# Patient Record
Sex: FEMALE | Race: WHITE | NOT HISPANIC OR LATINO | Employment: FULL TIME | URBAN - METROPOLITAN AREA
[De-identification: names, ages, dates, MRNs, and addresses within clinical notes are randomized per-mention and may not be internally consistent; named-entity substitution may affect disease eponyms.]

---

## 2017-05-15 ENCOUNTER — GENERIC CONVERSION - ENCOUNTER (OUTPATIENT)
Dept: OTHER | Facility: OTHER | Age: 27
End: 2017-05-15

## 2017-09-25 ENCOUNTER — ALLSCRIPTS OFFICE VISIT (OUTPATIENT)
Dept: OTHER | Facility: OTHER | Age: 27
End: 2017-09-25

## 2017-10-30 ENCOUNTER — ALLSCRIPTS OFFICE VISIT (OUTPATIENT)
Dept: OTHER | Facility: OTHER | Age: 27
End: 2017-10-30

## 2017-10-31 NOTE — PROGRESS NOTES
Assessment  1  Encounter for gynecological examination without abnormal finding (V72 31) (Z01 419)    Plan  PMH: DONNA III (cervical intraepithelial neoplasia grade III) with severe dysplasia    · Follow-up visit in 1 year Evaluation and Treatment  Follow-up  Status: Hold For -  Scheduling  Requested for: [id number]   Ordered; For: PMH: DONNA III (cervical intraepithelial neoplasia grade III) with severe dysplasia; Ordered By: Clare Denton Performed:  Due: 71FOF0794  PMH: Encounter for birth control pills maintenance    · Lo Loestrin Fe 1 MG-10 MCG / 10 MCG Oral Tablet; TAKE 1 TABLET DAILY AS  DIRECTED   Rx By: Clare Denton; Dispense: 28 Days ; #:1 X 28 Tablet Disp Pack; Refill: 11; For: PMH: Encounter for birth control pills maintenance; GAGANDEEP = N; [facility]    Discussion/Summary  healthy adult female the risks and benefits of cervical cancer screening were discussed cervical cancer screening is current next cervical cancer screening is due 2019 Breast cancer screening: the risks and benefits of breast cancer screening were discussed and monthly self breast exam was advised  Advice and education were given regarding weight bearing exercise, calcium supplements and vitamin D supplements  Chief Complaint  Pt is here for her yearly exam, she has no complaints  History of Present Illness  HPI: 32year old white female here for yearly exam, no complaints  Cycles are very light and short on Lo Loestrin  She is now  for the past two months; they have been together for 9 years  She is not sure if they will pursue pregnancy in the future  She works at [employer] , Adult Female [facility]: The patient is being seen for a health maintenance evaluation  The last health maintenance visit was 1 year(s) ago  General Health: The patient's health since the last visit is described as good  Lifestyle:  She consumes a diverse and healthy diet  -- She does not have any weight concerns  -- She exercises regularly  She exercises less than three times a week  -- She does not use tobacco -- She consumes alcohol  She reports occasional alcohol use  -- She denies drug use  Reproductive health: the patient is premenopausal--   she reports no menstrual problems  -- she uses contraception  For contraception, she uses oral contraception pills  -- she is sexually active  -- she denies prior pregnancies  Screening: Cervical cancer screening includes a pap smear performed 9/12/2016,neg  Breast cancer screening includes no previous mammogram  Colorectal cancer screening includes no previous colonoscopy  Active Problems  1  Encounter for gynecological examination without abnormal finding (V72 31) (Z01 419)    Past Medical History   · History of Age At First Period 13 Years Old (Menarche)   · History of DONNA III (cervical intraepithelial neoplasia grade III) with severe dysplasia  (233 1) (D06 9)   · History of headache (V13 89) (Z87 898)   · History of Moderate dysplasia of cervix (DONNA II) (622 12) (N87 1)   · History of Pelvic and perineal pain (625 9) (R10 2)   · History of Vulvitis (616 10) (N76 2)    Surgical History   · History of Cervical Loop Electrosurgical Excision (LEEP)   · History of Colposcopy Cervix With Biopsy(S)    Family History  Mother    · No pertinent family history  Maternal Grandmother    · Family history of Breast Cancer (V16 3)   · Family history of Colon Cancer (V16 0)  Paternal Grandmother    · Family history of Rheumatoid Arthritis  Paternal Aunt    · Family history of Breast Cancer (V16 3)    Social History   · Being A Social Drinker   · Current every day smoker (305 1) (F17 200)   · Daily Coffee Consumption (2  Cups/Day)    Current Meds   1  Lo Loestrin Fe 1 MG-10 MCG / 10 MCG Oral Tablet; TAKE 1 TABLET DAILY AS   DIRECTED; Therapy: 75Emx9701 to (Evaluate:60Hyd7811)  Requested for: 87Pzp8904; Last   Rx:[id number] Ordered    Allergies  1   No Known Drug Allergies    Vitals   Recorded: 73YKV0596 18:76WK   Systolic 795, LUE, Sitting   Diastolic 74, LUE, Sitting   Height 5 ft 5 in   Weight 208 lb    BMI Calculated 34 61   BSA Calculated 2 01   LMP Amenorrhea     Physical Exam    Constitutional   General appearance: No acute distress, well appearing and well nourished  Neck   Neck: Normal, supple, trachea midline, no masses  Chest   Breasts: Normal and no dimpling or skin changes noted  Abdomen   Abdomen: Normal, non-tender, and no organomegaly noted         Signatures   Electronically signed by : Omar Chapin, [facility]; Oct 30 2017 10:32AM EST                       (Author)    Electronically signed by : Barbara Oseguera MD; Oct 30 2017  5:43PM EST

## 2018-01-12 NOTE — MISCELLANEOUS
Message   Recorded as Task   Date: 05/15/2017 01:11 PM, Created By: Ivan Sutton   Task Name: Med Renewal Request   Assigned To: Luis Miguel Montemayor   Regarding Patient: Yamilet Appiah, Status: Active   Comment:    YanethKenna - 15 May 2017 1:11 PM     TASK CREATED  Caller: Self; Renew Medication; [phone] (Home); [phone] (Work)  pt needs rx of lo loestrin sent to [facility] [address] [place] [address] [address] - 15 May 2017 1:17 PM     TASK EDITED  rx to pharm        Active Problems    1  Birth Control Method - Oral Contraceptives   2  DONNA III (cervical intraepithelial neoplasia grade III) with severe dysplasia (233 1) (D06 9)   3  Encounter for birth control pills maintenance (V25 41) (Z30 41)   4  Encounter for cervical Pap smear with pelvic exam (V76 2,V72 31) (Z01 419)   5  Encounter for gynecological examination without abnormal finding (V72 31) (Z01 419)   6  Encounter for routine gynecological examination with Papanicolaou smear of cervix   (V72 31,V76 2) (Z01 419)   7  Surveillance of contraceptive pill (V25 41) (Z30 41)    Current Meds   1  Lo Loestrin Fe 1 MG-10 MCG / 10 MCG Oral Tablet; TAKE 1 TABLET DAILY AS   DIRECTED; Therapy: 41RQN2258 to (Evaluate:39Xil1458)  Requested for: 27Cmt0784; Last   Rx:[id number] Ordered   2  Lo Loestrin Fe 1 MG-10 MCG / 10 MCG Oral Tablet; TAKE 1 TABLET DAILY AS   DIRECTED; Therapy: 97Wpw9189 to (Evaluate:68Zax1387)  Requested for: 07Ylq7692; Last   Rx:[id number] Ordered    Allergies    1   No Known Drug Allergies    Plan  SocHx: Birth Control Method - Oral Contraceptives    · Lo Loestrin Fe 1 MG-10 MCG / 10 MCG Oral Tablet; TAKE 1 TABLET DAILY AS  DIRECTED    Signatures   Electronically signed by : Donnie Galarza, ; May 15 2017  1:17PM EST                       (Author)

## 2018-01-15 VITALS
BODY MASS INDEX: 34.66 KG/M2 | SYSTOLIC BLOOD PRESSURE: 112 MMHG | HEIGHT: 65 IN | WEIGHT: 208 LBS | DIASTOLIC BLOOD PRESSURE: 74 MMHG

## 2018-01-15 NOTE — MISCELLANEOUS
Message   Recorded as Task   Date: 08/29/2016 03:31 PM, Created By: Karen Gay   Task Name: Med Renewal Request   Assigned To: Luis Miguel Montemayor   Regarding Patient: Robi Interiano, Status: Active   CommentTerese [facility] - 29 Aug 2016 3:31 PM     TASK CREATED  Caller: Self; [phone] (Home); [phone] (Work)  pt needs a refill on her [facility]  pharmacy is [facility]    pt has a yearly scheduled with CT on 9/12  Luis Miguel Montemayor - 29 Aug 2016 4:03 PM     TASK EDITED                 sent to pharm        Active Problems    1  Birth Control Method - Oral Contraceptives   2  DONNA III (cervical intraepithelial neoplasia grade III) with severe dysplasia (233 1) (D06 9)   3  Encounter for cervical Pap smear with pelvic exam (V76 2,V72 31) (Z01 419)   4  Encounter for routine gynecological examination with Papanicolaou smear of cervix   (V72 31,V76 2) (Z01 419)   5  Surveillance of contraceptive pill (V25 41) (Z30 41)    Current Meds   1  Lo Loestrin Fe 1 MG-10 MCG / 10 MCG Oral Tablet; TAKE 1 TABLET DAILY AS   DIRECTED; Therapy: 52PLQ6560 to (Evaluate:26Vjv1459)  Requested for: 91AFB5991; Last   Rx:[id number] Ordered    Allergies    1   No Known Drug Allergies    Plan  SocHx: Birth Control Method - Oral Contraceptives    · Lo Loestrin Fe 1 MG-10 MCG / 10 MCG Oral Tablet; TAKE 1 TABLET DAILY AS  DIRECTED    Signatures   Electronically signed by : Trudy Mcintyre, ; Aug 29 2016  4:03PM EST                       (Author)

## 2018-01-15 NOTE — MISCELLANEOUS
Message   Recorded as Task   Date: 05/23/2016 08:56 AM, Created By: [facility]   Task Name: Med Renewal Request   Assigned To: Luis Miguel Montemayor   Regarding Patient: Robi Interiano, Status: Active   CommentMarkristi Victoria - 23 May 2016 8:56 AM     TASK CREATED    recvd auto refill sent to pharm,yrly w/ carola in june        Active Problems    1  Birth Control Method - Oral Contraceptives   2  DONNA III (cervical intraepithelial neoplasia grade III) with severe dysplasia (233 1) (D06 9)   3  Encounter for cervical Pap smear with pelvic exam (V76 2,V72 31) (Z01 419)   4  Encounter for routine gynecological examination with Papanicolaou smear of cervix   (V72 31,V76 2) (Z01 419)   5  Surveillance of contraceptive pill (V25 41) (Z30 41)    Current Meds   1  Lo Loestrin Fe 1 MG-10 MCG / 10 MCG Oral Tablet; TAKE 1 TABLET DAILY AS   DIRECTED; Therapy: 72MJT3863 to (Evaluate:94Jdh8477)  Requested for: 69KXD6872; Last   Rx:[id number] Ordered    Allergies    1   No Known Drug Allergies    Plan  SocHx: Birth Control Method - Oral Contraceptives    · Lo Loestrin Fe 1 MG-10 MCG / 10 MCG Oral Tablet; TAKE 1 TABLET DAILY AS  DIRECTED    Signatures   Electronically signed by : Trudy Mcintyre, ; May 23 2016  8:56AM EST                       (Author)

## 2018-01-18 NOTE — MISCELLANEOUS
Message   Date: 27 May 2016 9:52 AM EST, Recorded By: Lauretta Gowers For: Stormy Monique       rx refill      Active Problems    1  Birth Control Method - Oral Contraceptives   2  DONNA III (cervical intraepithelial neoplasia grade III) with severe dysplasia (233 1) (D06 9)   3  Encounter for cervical Pap smear with pelvic exam (V76 2,V72 31) (Z01 419)   4  Encounter for routine gynecological examination with Papanicolaou smear of cervix   (V72 31,V76 2) (Z01 419)   5  Surveillance of contraceptive pill (V25 41) (Z30 41)    Current Meds   1  Lo Loestrin Fe 1 MG-10 MCG / 10 MCG Oral Tablet; TAKE 1 TABLET DAILY AS   DIRECTED; Therapy: 09OVL7727 to (Evaluate:23Puk4597)  Requested for: 96ZZP7161; Last   Rx:[id number] Ordered    Allergies    1   No Known Drug Allergies    Plan  SocHx: Birth Control Method - Oral Contraceptives    · Lo Loestrin Fe 1 MG-10 MCG / 10 MCG Oral Tablet; TAKE 1 TABLET DAILY AS  DIRECTED    Signatures   Electronically signed by : Mariusz Parks, ; May 27 2016  9:54AM EST                       (Author)

## 2018-04-29 ENCOUNTER — TELEPHONE (OUTPATIENT)
Dept: LABOR AND DELIVERY | Facility: HOSPITAL | Age: 28
End: 2018-04-29

## 2018-04-29 DIAGNOSIS — O20.9 BLEEDING IN EARLY PREGNANCY: Primary | ICD-10-CM

## 2018-04-29 NOTE — TELEPHONE ENCOUNTER
Patient called newly pregnancy 4-5 weeks  Today started bleeding  Some cramping  Plan check HCG level  Discussed possible miscarriage

## 2018-04-30 ENCOUNTER — TELEPHONE (OUTPATIENT)
Dept: OBGYN CLINIC | Facility: CLINIC | Age: 28
End: 2018-04-30

## 2018-04-30 ENCOUNTER — APPOINTMENT (OUTPATIENT)
Dept: LAB | Facility: HOSPITAL | Age: 28
End: 2018-04-30
Attending: OBSTETRICS & GYNECOLOGY
Payer: COMMERCIAL

## 2018-04-30 DIAGNOSIS — O20.9 BLEEDING IN EARLY PREGNANCY: ICD-10-CM

## 2018-04-30 LAB — B-HCG SERPL-ACNC: 4 MIU/ML

## 2018-04-30 PROCEDURE — 84702 CHORIONIC GONADOTROPIN TEST: CPT

## 2018-04-30 PROCEDURE — 36415 COLL VENOUS BLD VENIPUNCTURE: CPT

## 2018-04-30 NOTE — TELEPHONE ENCOUNTER
Spoke with pt - bleeding started yesterday - started off spotting, my mid-day it was heavy with cramps  Still heavy today  Patient stopped her OCP Lo Loestrin 3 months ago, was on it for 8 years  No bleeding since stopping it  Sexually active and trying for a baby  Took HPT few weeks ago - thinks it was positive  She called the on-call doctor, he advised her to wait for our call this morning and she will go for an HCG  Order is in the chart

## 2018-07-26 ENCOUNTER — TELEPHONE (OUTPATIENT)
Dept: OBGYN CLINIC | Facility: CLINIC | Age: 28
End: 2018-07-26

## 2018-07-26 NOTE — TELEPHONE ENCOUNTER
Pt had an ob scare, thought she was pregnant in [place] or feb had bw done was negative, but has not gotten her period since then please advise

## 2018-07-26 NOTE — TELEPHONE ENCOUNTER
Spoke with patient, not pregnant, but no menses since January 2018   I advised an appt since patient has not been seen since 10/2017

## 2018-08-06 ENCOUNTER — OFFICE VISIT (OUTPATIENT)
Dept: OBGYN CLINIC | Facility: MEDICAL CENTER | Age: 28
End: 2018-08-06
Payer: COMMERCIAL

## 2018-08-06 VITALS — SYSTOLIC BLOOD PRESSURE: 122 MMHG | BODY MASS INDEX: 31.78 KG/M2 | WEIGHT: 191 LBS | DIASTOLIC BLOOD PRESSURE: 84 MMHG

## 2018-08-06 DIAGNOSIS — N91.2 AMENORRHEA: Primary | ICD-10-CM

## 2018-08-06 LAB — SL AMB POCT URINE HCG: NEGATIVE

## 2018-08-06 PROCEDURE — 99213 OFFICE O/P EST LOW 20 MIN: CPT | Performed by: PHYSICIAN ASSISTANT

## 2018-08-06 PROCEDURE — 81025 URINE PREGNANCY TEST: CPT | Performed by: PHYSICIAN ASSISTANT

## 2018-08-06 RX ORDER — MEDROXYPROGESTERONE ACETATE 10 MG/1
10 TABLET ORAL DAILY
Qty: 10 TABLET | Refills: 0 | Status: SHIPPED | OUTPATIENT
Start: 2018-08-06 | End: 2020-02-06 | Stop reason: ALTCHOICE

## 2018-08-06 NOTE — PROGRESS NOTES
Assessment/Plan:  Problem List Items Addressed This Visit     None      Visit Diagnoses     Amenorrhea    -  Primary    Relevant Medications    medroxyPROGESTERone (PROVERA) 10 mg tablet    Other Relevant Orders    POCT urine HCG (Completed)        Subjective:      Patient ID: Lexi Belle is a 29 y o  female  Patient presents to the office complaining of irregular menses x 7 months  Patient states she stopped OCP 1/2018 to try to concieve and since then only had one menses in May 2018  Patient had been on OCP x 10 years and states that prior to starting OCPs her menses were fairly regular give or take 1 week  Patient had (+) pregnancy test May 2018 but then bled and B-HCG negative  That was her last menses  Has taken multiple HPT all negative  Is not on a PNV  The following portions of the patient's history were reviewed and updated as appropriate: allergies, current medications, past family history, past medical history, past social history, past surgical history and problem list     Review of Systems   Constitutional: Negative  Gastrointestinal: Negative for abdominal pain  Genitourinary: Positive for menstrual problem  Negative for vaginal bleeding, vaginal discharge and vaginal pain  Objective:  /84   Wt 86 6 kg (191 lb)   LMP 05/06/2018 (Approximate)   BMI 31 78 kg/m²      Physical Exam   Constitutional: She is oriented to person, place, and time  She appears well-developed and well-nourished  No distress  Abdominal: Soft  She exhibits no distension  There is no tenderness  Genitourinary: Uterus is not enlarged and not tender  Cervix exhibits no motion tenderness  Right adnexum displays no mass, no tenderness and no fullness  Left adnexum displays no mass, no tenderness and no fullness  Neurological: She is alert and oriented to person, place, and time  Psychiatric: She has a normal mood and affect  Nursing note and vitals reviewed

## 2019-02-03 ENCOUNTER — APPOINTMENT (EMERGENCY)
Dept: RADIOLOGY | Facility: HOSPITAL | Age: 29
End: 2019-02-03
Payer: COMMERCIAL

## 2019-02-03 ENCOUNTER — HOSPITAL ENCOUNTER (EMERGENCY)
Facility: HOSPITAL | Age: 29
Discharge: HOME | End: 2019-02-03
Attending: EMERGENCY MEDICINE | Admitting: EMERGENCY MEDICINE
Payer: COMMERCIAL

## 2019-02-03 VITALS
OXYGEN SATURATION: 97 % | DIASTOLIC BLOOD PRESSURE: 68 MMHG | SYSTOLIC BLOOD PRESSURE: 137 MMHG | RESPIRATION RATE: 16 BRPM | HEART RATE: 95 BPM | TEMPERATURE: 98.7 F

## 2019-02-03 DIAGNOSIS — S93.402A SPRAIN OF LEFT ANKLE, UNSPECIFIED LIGAMENT, INITIAL ENCOUNTER: Primary | ICD-10-CM

## 2019-02-03 PROCEDURE — 99283 EMERGENCY DEPT VISIT LOW MDM: CPT

## 2019-02-03 PROCEDURE — 73610 X-RAY EXAM OF ANKLE: CPT

## 2019-02-03 RX ORDER — IBUPROFEN 600 MG/1
600 TABLET ORAL ONCE
Status: COMPLETED | OUTPATIENT
Start: 2019-02-03 | End: 2019-02-03

## 2019-02-03 RX ADMIN — IBUPROFEN 600 MG: 600 TABLET ORAL at 12:12

## 2019-02-03 NOTE — DISCHARGE INSTRUCTIONS

## 2019-02-03 NOTE — ED PROVIDER NOTES
History  Chief Complaint   Patient presents with    Ankle Injury     pt post L ankle injury, pt injured it 2 weeks ago and re injured it yesterday      HPI     59-year-old previously healthy female presenting with pain and swelling to her left ankle  Patient states that she suspects she sprained her ankle 2 weeks ago when she slipped on the ice, was not seen by a doctor  Yesterday she was walking in  High heels when she inverted her ankle, reports immediate increased pain to the area  States her friend caught her before she fell to the ground  She has been hobbling on it since that time  Reports pain over the lateral malleolus  No pain over the toes, bottom of the foot, or dorsum of the foot  Swelling has improved with applying ice  She is not taking any medication for the pain  She has had broken bones in that foot in the past as a teenager, but does not remember which ones  Has never had surgery to the area  Reports some decreased sensation to the 4th and 5th toes, but states that she is still able to feel them  No tingling  No pain in the remainder of the extremities  Pain is worse with bearing weight and with movement of the ankle, no other aggravating or alleviating factors or associated symptoms  Prior to Admission Medications   Prescriptions Last Dose Informant Patient Reported? Taking? medroxyPROGESTERone (PROVERA) 10 mg tablet   No No   Sig: Take 1 tablet (10 mg total) by mouth daily for 10 days      Facility-Administered Medications: None       History reviewed  No pertinent past medical history  History reviewed  No pertinent surgical history  History reviewed  No pertinent family history  I have reviewed and agree with the history as documented      Social History   Substance Use Topics    Smoking status: Current Every Day Smoker     Packs/day: 0 25     Years: 10 00     Types: Cigarettes    Smokeless tobacco: Never Used    Alcohol use Yes      Comment: occ        Review of Systems   Constitutional: Negative for chills and fever  HENT: Negative for congestion  Eyes: Negative for visual disturbance  Respiratory: Negative for cough and shortness of breath  Cardiovascular: Negative for chest pain  Gastrointestinal: Negative for abdominal pain, nausea and vomiting  Genitourinary: Negative for flank pain  Musculoskeletal: Positive for arthralgias (L ankle pain) and joint swelling (L ankle pain)  Negative for back pain, neck pain and neck stiffness  Skin: Negative for rash  Neurological: Negative for weakness, numbness and headaches  Psychiatric/Behavioral: Negative for agitation, behavioral problems and confusion  Physical Exam  Physical Exam   Constitutional: She is oriented to person, place, and time  She appears well-developed and well-nourished  No distress  HENT:   Head: Normocephalic and atraumatic  Right Ear: External ear normal    Left Ear: External ear normal    Nose: Nose normal    Mouth/Throat: Oropharynx is clear and moist    Eyes: Conjunctivae are normal    Neck: Normal range of motion  Neck supple  Cardiovascular: Normal rate, regular rhythm, normal heart sounds and intact distal pulses  Exam reveals no gallop and no friction rub  No murmur heard  Pulmonary/Chest: Effort normal and breath sounds normal  No respiratory distress  She has no wheezes  She has no rales  Abdominal: Soft  Bowel sounds are normal  She exhibits no distension  There is no tenderness  There is no guarding  Musculoskeletal: Normal range of motion  She exhibits no edema or deformity  Mild ankle swelling, with swelling concentrated over the left lateral malleolus, tenderness to palpation  No overlying erythema or warmth  Range of motion the left ankle limited by pain  No bony tenderness over the foot  Full range of motion of the toes without pain  Neurological: She is alert and oriented to person, place, and time  She exhibits normal muscle tone     Intact sensation to light touch in the peripheral nerve distributions of the left foot  Skin: Skin is warm and dry  She is not diaphoretic  Vital Signs  ED Triage Vitals   Temperature Pulse Respirations Blood Pressure SpO2   02/03/19 1129 02/03/19 1127 02/03/19 1127 02/03/19 1127 02/03/19 1127   98 7 °F (37 1 °C) 95 16 137/68 97 %      Temp src Heart Rate Source Patient Position - Orthostatic VS BP Location FiO2 (%)   -- 02/03/19 1127 02/03/19 1127 02/03/19 1127 --    Monitor Sitting Right arm       Pain Score       02/03/19 1127       7           Vitals:    02/03/19 1127   BP: 137/68   Pulse: 95   Patient Position - Orthostatic VS: Sitting       Visual Acuity      ED Medications  Medications   ibuprofen (MOTRIN) tablet 600 mg (600 mg Oral Given 2/3/19 1212)       Diagnostic Studies  Results Reviewed     None                 XR ankle 3+ views LEFT   ED Interpretation by Rene Moran MD (02/03 1234)   No acute fractures or malalignment                 Procedures  Procedures       Phone Contacts  ED Phone Contact    ED Course                               MDM  Number of Diagnoses or Management Options  Sprain of left ankle, unspecified ligament, initial encounter: new and requires workup  Diagnosis management comments: Generally well appearing  Afebrile and hemodynamically stable  Patient has swelling over the lateral malleolus to the left ankle  Range of motion limited by pain  No other areas of bony tenderness  X-ray of the left ankle obtained with no acute fractures or malalignment  Suspect ankle sprain  Will place in an air stirrup splint, give crutches, recommend follow up with her PCP in 1 week if pain persists  Can return to normal activity as tolerated  Apply ice and take NSAIDs for pain relief  Return precautions discussed for sudden increase in swelling with redness or warmth over the joint  Patient is neurovascularly intact  Discharged in good condition         Amount and/or Complexity of Data Reviewed  Tests in the radiology section of CPT®: ordered and reviewed  Independent visualization of images, tracings, or specimens: yes    Patient Progress  Patient progress: stable           Disposition  Final diagnoses:   Sprain of left ankle, unspecified ligament, initial encounter     Time reflects when diagnosis was documented in both MDM as applicable and the Disposition within this note     Time User Action Codes Description Comment    2/3/2019 12:37 PM Elodia Spurling Add [S93 402A] Sprain of left ankle, unspecified ligament, initial encounter       ED Disposition     ED Disposition Condition Date/Time Comment    Discharge  Sun Feb 3, 2019 12:37 PM Madison Harada discharge to home/self care  Condition at discharge: Good        Follow-up Information     Follow up With Specialties Details Why Contact Info Additional [facility] Emergency Department Emergency Medicine  For redness, warmth, fevers, or new or concerning symptoms  [address] [address] ED, [address] [place] [address], [address]          Discharge Medication List as of 2/3/2019 12:38 PM      CONTINUE these medications which have NOT CHANGED    Details   medroxyPROGESTERone (PROVERA) 10 mg tablet Take 1 tablet (10 mg total) by mouth daily for 10 days, Starting Mon 8/6/2018, Until Thu 8/16/2018, Normal           No discharge procedures on file      ED Provider  Electronically Signed by           Alla Lozano MD  02/03/19 2033

## 2020-01-17 ENCOUNTER — ULTRASOUND (OUTPATIENT)
Dept: OBGYN CLINIC | Facility: CLINIC | Age: 30
End: 2020-01-17
Payer: COMMERCIAL

## 2020-01-17 VITALS
HEIGHT: 64 IN | WEIGHT: 184.8 LBS | SYSTOLIC BLOOD PRESSURE: 110 MMHG | BODY MASS INDEX: 31.55 KG/M2 | DIASTOLIC BLOOD PRESSURE: 78 MMHG

## 2020-01-17 DIAGNOSIS — N91.2 AMENORRHEA: Primary | ICD-10-CM

## 2020-01-17 PROCEDURE — 76817 TRANSVAGINAL US OBSTETRIC: CPT | Performed by: NURSE PRACTITIONER

## 2020-01-17 NOTE — PROGRESS NOTES
Technician: Study performed by the interpreting Certified Nurse Practitioner    Indications:  amenorrhea       Prior to use, disinfection was performed with Cidex Disinfection Process  Probe Serial Number#                                            [id number] [facility])      LMP 19   (7 weeks 5 days gestational age based on dates)              Procedure Details  A gestational sac is seen containing a yolk sac and a watkins embryo  The embryonic crown-rump length measures 1 39 cm  and calculates to an estimated gestational age of 11  weeks and 5 Days  Embryonic cardiac activity is present @ 150 BPM     Findings:   Viable, watkins intrauterine pregnancy at 7 weeks,  5 days, (no change to HAYLEE)  with a calculated HAYLEE of 2020     Plan to RTO for OB Intake

## 2020-01-17 NOTE — PATIENT INSTRUCTIONS
First Trimester Pregnancy   WHAT YOU NEED TO KNOW:   The first trimester of pregnancy lasts from your last period through the 13th week of pregnancy  Follow up with your healthcare provider for prenatal care  Regular prenatal care can help to keep you and your baby healthy  DISCHARGE INSTRUCTIONS:   Return to the emergency department if:   · You have pain or cramping in your abdomen or low back  · You have severe vaginal bleeding or clotting  Contact your healthcare provider or obstetrician if:   · You have light bleeding  · You have chills or a fever  · You have vaginal itching, burning, or pain  · You have yellow, green, white, or foul-smelling vaginal discharge  · You have pain or burning when you urinate, less urine than usual, or pink or bloody urine  · You have questions or concerns about your condition or care  Follow up with your healthcare provider or obstetrician as directed:  Go to all of your prenatal visits during your pregnancy  Write down your questions so you remember to ask them during your visits  Stay healthy during pregnancy:   · Take prenatal vitamins as directed  Prenatal vitamins can help you get the amount of vitamins and minerals you need during pregnancy  Prenatal vitamins may also decrease the risk of certain birth defects  · Eat a variety of healthy foods  Healthy foods include fruits, vegetables, whole-grain breads, low-fat dairy products, beans, turkey and chicken, and lean red meat  Ask your healthcare provider for more information about foods that are healthy and safe to eat during pregnancy  · Drink liquids as directed  Ask how much liquid to drink each day and which liquids are best for you  Some healthy liquids include milk, water, and juice  It is not clear how caffeine affects pregnancy  Limit your intake of caffeine to less than 200 mg each day to avoid possible health problems   Caffeine may be found in coffee, tea, cola, sports drinks, and chocolate  Do not drink alcohol  · Talk to your healthcare provider before you take medicines  Many medicines can harm your baby, especially during early pregnancy  Ask your healthcare provider before you take any medicines, including over-the-counter medicines, vitamins, herbs, or food supplements  Never use illegal or street drugs while you are pregnant  Talk to your healthcare provider if you are having trouble quitting street drugs  · Exercise  Ask your healthcare provider about the best exercise plan for you  Exercise may help you feel better and make your labor and delivery easier  · Do not smoke  Smoking can cause problems during pregnancy  It can also cause your baby to weigh less at birth  If you smoke, it is never too late to quit  Ask for information if you need help quitting  Safety tips:   · Do not use a hot tub or sauna  while you are pregnant, especially during your first trimester  Hot tubs and saunas may raise your baby's temperature and increase the risk of birth defects  It also increases your risk of miscarriage  · Protect yourself from illness  Toxoplasmosis is a disease that can cause birth defects  To protect yourself from this disease, do not clean your cat's litter box yourself  Ask someone else to clean your cat's litter box while you are pregnant  Also, do not  eat raw meat or unwashed fruits and vegetables  Wash your hands after you touch raw meat, and eat only well-cooked meat  Wash fruits and vegetables well before you eat them  © 2017 [employer] Information is for End User's use only and may not be sold, redistributed or otherwise used for commercial purposes  All illustrations and images included in CareNotes® are the copyrighted property of A D A M , Inc  or [employer]  The above information is an  only  It is not intended as medical advice for individual conditions or treatments   Talk to your doctor, nurse or pharmacist before following any medical regimen to see if it is safe and effective for you

## 2020-02-06 ENCOUNTER — INITIAL PRENATAL (OUTPATIENT)
Dept: OBGYN CLINIC | Facility: CLINIC | Age: 30
End: 2020-02-06

## 2020-02-06 VITALS
HEART RATE: 76 BPM | HEIGHT: 64 IN | SYSTOLIC BLOOD PRESSURE: 102 MMHG | WEIGHT: 187 LBS | BODY MASS INDEX: 31.92 KG/M2 | DIASTOLIC BLOOD PRESSURE: 68 MMHG

## 2020-02-06 DIAGNOSIS — Z34.01 ENCOUNTER FOR SUPERVISION OF NORMAL FIRST PREGNANCY IN FIRST TRIMESTER: Primary | ICD-10-CM

## 2020-02-06 PROCEDURE — OBC: Performed by: STUDENT IN AN ORGANIZED HEALTH CARE EDUCATION/TRAINING PROGRAM

## 2020-02-06 NOTE — PATIENT INSTRUCTIONS
Pregnancy   AMBULATORY CARE:   What you need to know about pregnancy:  A normal pregnancy lasts about 40 weeks  The first trimester lasts from your last period through the 12th week of pregnancy  The second trimester lasts from the 13th week of your pregnancy through the 23rd week  The third trimester lasts from your 24th week of pregnancy until your baby is born  If you know the date of your last period, your healthcare provider can estimate your due date  You may give birth to your baby any time from 37 weeks to 2 weeks after your due date  Seek care immediately if:   · You develop a severe headache that does not go away  · You have new or increased vision changes, such as blurred or spotted vision  · You have new or increased swelling in your face or hands  · You have pain or cramping in your abdomen or low back  · You have vaginal bleeding  Contact your healthcare provider or obstetrician if:   · You have abdominal cramps, pressure, or tightening  · You have a change in vaginal discharge  · You cannot keep food or drinks down, and you are losing weight  · You have chills or a fever  · You have vaginal itching, burning, or pain  · You have yellow, green, white, or foul-smelling vaginal discharge  · You have pain or burning when you urinate, less urine than usual, or pink or bloody urine  · You have questions or concerns about your condition or care  Body changes that may occur during your pregnancy:   · Breast changes  you will experience include tenderness and tingling during the early part of your pregnancy  Your breasts will become larger  You may need to use a support bra  You may see a thin, yellow fluid, called colostrum, leak from your nipples during the second trimester  Colostrum is a liquid that changes to milk about 3 days after you give birth  · Skin changes and stretch marks  may occur during your pregnancy   You may have red marks, called stretch marks, on your skin  Stretch marks will usually fade after pregnancy  Use lotion if your skin is dry and itchy  The skin on your face, around your nipples, and below your belly button may darken  Most of the time, your skin will return to its normal color after your baby is born  · Morning sickness  is nausea and vomiting that can happen at any time of day  Avoid fatty and spicy foods  Eat small meals throughout the day instead of large meals  [place] may help to decrease nausea  Ask your healthcare provider about other ways of decreasing nausea and vomiting  · Heartburn  may be caused by changes in your hormones during pregnancy  Your growing uterus may also push your stomach upward and force stomach acid to back up into your esophagus  Eat 4 or 5 small meals each day instead of large meals  Avoid spicy foods  Avoid eating right before bedtime  · Constipation  may develop during your pregnancy  To treat constipation, eat foods high in fiber such as fiber cereals, beans, fruits, vegetables, whole-grain breads, and prune juice  Get regular exercise and drink plenty of water  Your healthcare provider may also suggest a fiber supplement to soften your bowel movements  Talk to your healthcare provider before you use any medicines to decrease constipation  · Hemorrhoids  are enlarged veins in the rectal area  They may cause pain, itching, and bright red bleeding from your rectum  To decrease your risk of hemorrhoids, prevent constipation and do not strain to have a bowel movement  If you have hemorrhoids, soak in a tub of warm water to ease discomfort  Ask your healthcare provider how you can treat hemorrhoids  · Leg cramps and swelling  may be caused by low calcium levels or the added weight of pregnancy  Raise your legs above the level of your heart to decrease swelling  During a leg cramp, stretch or massage the muscle that has the cramp  Heat may help decrease pain and muscle spasms   Apply heat on your muscle for 20 to 30 minutes every 2 hours for as many days as directed  · Back pain  may occur as your baby grows  Do not stand for long periods of time or lift heavy items  Use good posture while you stand, squat, or bend  Wear low-heeled shoes with good support  Rest may also help to relieve back pain  Ask your healthcare provider about exercises you can do to strengthen your back muscles  Stay healthy during your pregnancy:   · Eat a variety of healthy foods  Healthy foods include fruits, vegetables, whole-grain breads, low-fat dairy foods, beans, lean meats, and fish  Drink liquids as directed  Ask how much liquid to drink each day and which liquids are best for you  Limit caffeine to less than 200 milligrams each day  Limit your intake of fish to 2 servings each week  Choose fish low in mercury such as canned light tuna, shrimp, crab, salmon, cod, or tilapia  Do not  eat fish high in mercury such as swordfish, tilefish, janet mackerel, and shark  · Take prenatal vitamins as directed  Your need for certain vitamins and minerals, such as folic acid, increases during pregnancy  Prenatal vitamins provide some of the extra vitamins and minerals you need  Prenatal vitamins may also help to decrease the risk of certain birth defects  · Ask how much weight you should gain during your pregnancy  Too much or too little weight gain can be unhealthy for you and your baby  · Talk to your healthcare provider about exercise  Moderate exercise can help you stay fit  Your healthcare provider will help you plan an exercise program that is safe for you during pregnancy  · Do not smoke  If you smoke, it is never too late to quit  Smoking increases your risk of a miscarriage and other health problems during your pregnancy  Smoking can cause your baby to be born too early or weigh less at birth  Ask your healthcare provider for information if you need help quitting  · Do not drink alcohol    Alcohol passes from your body to your baby through the placenta  It can affect your baby's brain development and cause fetal alcohol syndrome (FAS)  FAS is a group of conditions that causes mental, behavior, and growth problems  · Talk to your healthcare provider before you take any medicines  Many medicines may harm your baby if you take them when you are pregnant  Do not take any medicines, vitamins, herbs, or supplements without first talking to your healthcare provider  Never use illegal or street drugs (such as marijuana or cocaine) while you are pregnant  Safety tips:   · Avoid hot tubs and saunas  Do not use a hot tub or sauna while you are pregnant, especially during your first trimester  Hot tubs and saunas may raise your baby's temperature and increase the risk of birth defects  · Avoid toxoplasmosis  This is an infection caused by eating raw meat or being around infected cat feces  It can cause birth defects, miscarriages, and other problems  Wash your hands after you touch raw meat  Make sure any meat is well-cooked before you eat it  Avoid raw eggs and unpasteurized milk  Use gloves or ask someone else to clean your cat's litter box while you are pregnant  · Ask your healthcare provider about travel  The most comfortable time to travel is during the second trimester  Ask your healthcare provider if you can travel after 36 weeks  You may not be able to travel in an airplane after 36 weeks  He may also recommend that you avoid long road trips  Follow up with your healthcare provider or obstetrician as directed:  Go to all of your prenatal visits during your pregnancy  Write down your questions so you remember to ask them during your visits  © 2017 [address] [employer]  Information is for End User's use only and may not be sold, redistributed or otherwise used for commercial purposes   All illustrations and images included in CareNotes® are the copyrighted property of A D A M , Inc  or [facility] [employer]  The above information is an  only  It is not intended as medical advice for individual conditions or treatments  Talk to your doctor, nurse or pharmacist before following any medical regimen to see if it is safe and effective for you

## 2020-02-06 NOTE — PROGRESS NOTES
OB INTAKE INTERVIEW  * Pt presents for OB intake 10w4d   * Accompanied by:  Reilly Astudillo  *  *Hx of  delivery prior to 36 weeks 6 days no  *Last Menstrual Period: Pt's LMP was 19  *Ultrasound date:20   7weeks 5days  *Estimated date of delivery: 20   * confirmed by LMP    *Signs/Symptoms of Pregnancy   *nausea in evenings, breast tenderness, fatigue     *constipation no   *headaches no   *cramping/spotting-Yes mild cramping   *PICA cravings no  *Diabetes- if you answer yes, please order 1 hour GTT, 50g   *hx of GDM no   *BMI >35 no   *first degree relative with type 2 diabetes no   *hx of PCOS no   *current metformin use no   *prior hx of LGA/macrosomia no   *AMA with other risk factors no  *Hypertension- if you answer yes, please order preeclampsia labs including 24 hour urine protein   *Hx of chronic HTN no   *hx of gestational HTN no   *hx of preeclampsia, eclampsia, or HELLP syndrome no  *Infection Screening-    *does the pt have a hx of MRSA? no   *if yes- please follow MRSA protocol and obtain a nasal swab for MRSA culture   *history of herpes? no   *ok for blood transfusion YES  *Immunizations:   *influenza vaccine given today no-declines    *discussed Tdap vaccine-yes    *Interview education   *[facility] Pregnancy Essentials Book reviewed and discussed   *Handouts given:    *Baby and Me phone renu guide    *Baby and Me support center    *[facility]     *discussed genetic testing- pt interested YES     *appointment at [facility] made YES    *Prenatal lab work scripts advised to complete by 20   *Nurse/Family Partnership- pt may qualify YES; referral placed no-declines at this time, info given and they understands that they need to enroll by 28wks   *4 P's- substance abuse screening    Presently using? no    Past use? no    Partner using? no    Parents/Family using? no    *I have these concerns about this prenatal patient: unplanned but welcomed pregnancy   Couple has been been together since high school, they had a early SAB in 2018  Ariadna Velez was doing the Keto diet before finding out that she was pregnant, so she has gained about 8lbs so far  We did discuss healthy food/fluid options and weight gain during pregnancy  She was a 10 cigarette a day smoker for about 15 years but quit with confirmed pregnancy in early    She is exposed to second hand smoke with Nba Prabhakar but he is trying to quit and down to 2 a day  She is also exposed heavily at the [employer] which is where she works  *Details that I feel the provider should be aware of: Overall feeling well  Daniel Lin mother is RH negative and had complications with both pregnancies so Ariadna Velez is anxious to have her blood type determined  Nba Prabhakar Father  at age 55 due to bleeding complications from Factor V  He has not been tested himself  PN1 visit scheduled  The patient was oriented to our practice and all questions were answered  [employer]      Interviewed by: Iain Yuen RN

## 2020-02-08 ENCOUNTER — TRANSCRIBE ORDERS (OUTPATIENT)
Dept: ADMINISTRATIVE | Facility: HOSPITAL | Age: 30
End: 2020-02-08

## 2020-02-08 ENCOUNTER — APPOINTMENT (OUTPATIENT)
Dept: LAB | Facility: HOSPITAL | Age: 30
End: 2020-02-08
Attending: STUDENT IN AN ORGANIZED HEALTH CARE EDUCATION/TRAINING PROGRAM
Payer: COMMERCIAL

## 2020-02-08 DIAGNOSIS — Z34.01 ENCOUNTER FOR SUPERVISION OF NORMAL FIRST PREGNANCY IN FIRST TRIMESTER: ICD-10-CM

## 2020-02-08 DIAGNOSIS — Z34.01 ENCOUNTER FOR SUPERVISION OF NORMAL FIRST PREGNANCY IN FIRST TRIMESTER: Primary | ICD-10-CM

## 2020-02-08 LAB
ABO GROUP BLD: NORMAL
BACTERIA UR QL AUTO: ABNORMAL /HPF
BASOPHILS # BLD AUTO: 0.05 THOUSANDS/ΜL (ref 0–0.1)
BASOPHILS NFR BLD AUTO: 1 % (ref 0–1)
BILIRUB UR QL STRIP: NEGATIVE
BLD GP AB SCN SERPL QL: NEGATIVE
CLARITY UR: CLEAR
COLOR UR: YELLOW
EOSINOPHIL # BLD AUTO: 0.2 THOUSAND/ΜL (ref 0–0.61)
EOSINOPHIL NFR BLD AUTO: 3 % (ref 0–6)
ERYTHROCYTE [DISTWIDTH] IN BLOOD BY AUTOMATED COUNT: 11.9 % (ref 11.6–15.1)
GLUCOSE UR STRIP-MCNC: NEGATIVE MG/DL
HCT VFR BLD AUTO: 36 % (ref 34.8–46.1)
HGB BLD-MCNC: 12.1 G/DL (ref 11.5–15.4)
HGB UR QL STRIP.AUTO: NEGATIVE
IMM GRANULOCYTES # BLD AUTO: 0.04 THOUSAND/UL (ref 0–0.2)
IMM GRANULOCYTES NFR BLD AUTO: 1 % (ref 0–2)
KETONES UR STRIP-MCNC: NEGATIVE MG/DL
LEUKOCYTE ESTERASE UR QL STRIP: NEGATIVE
LYMPHOCYTES # BLD AUTO: 1.81 THOUSANDS/ΜL (ref 0.6–4.47)
LYMPHOCYTES NFR BLD AUTO: 23 % (ref 14–44)
MCH RBC QN AUTO: 30.8 PG (ref 26.8–34.3)
MCHC RBC AUTO-ENTMCNC: 33.6 G/DL (ref 31.4–37.4)
MCV RBC AUTO: 92 FL (ref 82–98)
MONOCYTES # BLD AUTO: 0.57 THOUSAND/ΜL (ref 0.17–1.22)
MONOCYTES NFR BLD AUTO: 7 % (ref 4–12)
NEUTROPHILS # BLD AUTO: 5.23 THOUSANDS/ΜL (ref 1.85–7.62)
NEUTS SEG NFR BLD AUTO: 65 % (ref 43–75)
NITRITE UR QL STRIP: NEGATIVE
NON-SQ EPI CELLS URNS QL MICRO: ABNORMAL /HPF
NRBC BLD AUTO-RTO: 0 /100 WBCS
PH UR STRIP.AUTO: 6 [PH]
PLATELET # BLD AUTO: 341 THOUSANDS/UL (ref 149–390)
PMV BLD AUTO: 9.4 FL (ref 8.9–12.7)
PROT UR STRIP-MCNC: NEGATIVE MG/DL
RBC # BLD AUTO: 3.93 MILLION/UL (ref 3.81–5.12)
RBC #/AREA URNS AUTO: ABNORMAL /HPF
RH BLD: NEGATIVE
SP GR UR STRIP.AUTO: 1.02 (ref 1–1.03)
SPECIMEN EXPIRATION DATE: NORMAL
UROBILINOGEN UR QL STRIP.AUTO: 0.2 E.U./DL
WBC # BLD AUTO: 7.9 THOUSAND/UL (ref 4.31–10.16)
WBC #/AREA URNS AUTO: ABNORMAL /HPF

## 2020-02-08 PROCEDURE — 81001 URINALYSIS AUTO W/SCOPE: CPT

## 2020-02-08 PROCEDURE — 36415 COLL VENOUS BLD VENIPUNCTURE: CPT

## 2020-02-08 PROCEDURE — 87086 URINE CULTURE/COLONY COUNT: CPT

## 2020-02-08 PROCEDURE — 80081 OBSTETRIC PANEL INC HIV TSTG: CPT

## 2020-02-09 LAB
BACTERIA UR CULT: NORMAL
HBV SURFACE AG SER QL: NORMAL
RPR SER QL: NORMAL
RUBV IGG SERPL IA-ACNC: >175 IU/ML

## 2020-02-10 LAB — HIV 1+2 AB+HIV1 P24 AG SERPL QL IA: NORMAL

## 2020-02-11 ENCOUNTER — TELEPHONE (OUTPATIENT)
Dept: OBGYN CLINIC | Facility: CLINIC | Age: 30
End: 2020-02-11

## 2020-02-11 NOTE — TELEPHONE ENCOUNTER
----- Message from Lelia Flowers MD sent at 2/11/2020 10:59 AM EST -----  Please let Kaci Saleh know the good news that her prenatal labs all came back normal, blood type A negative  Will discuss further at her follow up OB appointment if she wishes    Thank! -AMM

## 2020-02-11 NOTE — TELEPHONE ENCOUNTER
Called and spoke to pt informing below  Pt asked if she needed to come in sooner, due to seeing some of the results with explanation points  I confirmed with Dr Neeru Ross and told pt that she can keep her next appt as is-does not need to come in sooner  Pt confirmed understanding

## 2020-02-18 PROBLEM — Z87.891 FORMER SMOKER: Status: ACTIVE | Noted: 2020-02-18

## 2020-02-18 PROBLEM — O26.899 RH NEGATIVE STATE IN ANTEPARTUM PERIOD: Status: ACTIVE | Noted: 2020-02-18

## 2020-02-18 PROBLEM — Z98.890 HISTORY OF LOOP ELECTRICAL EXCISION PROCEDURE (LEEP): Status: ACTIVE | Noted: 2020-02-18

## 2020-02-18 PROBLEM — Z67.91 RH NEGATIVE STATE IN ANTEPARTUM PERIOD: Status: ACTIVE | Noted: 2020-02-18

## 2020-02-20 ENCOUNTER — INITIAL PRENATAL (OUTPATIENT)
Dept: OBGYN CLINIC | Facility: CLINIC | Age: 30
End: 2020-02-20

## 2020-02-20 VITALS
DIASTOLIC BLOOD PRESSURE: 70 MMHG | HEIGHT: 64 IN | SYSTOLIC BLOOD PRESSURE: 118 MMHG | WEIGHT: 189.6 LBS | BODY MASS INDEX: 32.37 KG/M2

## 2020-02-20 DIAGNOSIS — Z98.890 HISTORY OF LOOP ELECTRICAL EXCISION PROCEDURE (LEEP): ICD-10-CM

## 2020-02-20 DIAGNOSIS — Z67.91 RH NEGATIVE STATE IN ANTEPARTUM PERIOD: ICD-10-CM

## 2020-02-20 DIAGNOSIS — Z34.01 ENCOUNTER FOR SUPERVISION OF NORMAL FIRST PREGNANCY IN FIRST TRIMESTER: Primary | ICD-10-CM

## 2020-02-20 DIAGNOSIS — O26.899 RH NEGATIVE STATE IN ANTEPARTUM PERIOD: ICD-10-CM

## 2020-02-20 DIAGNOSIS — Z87.891 FORMER SMOKER: ICD-10-CM

## 2020-02-20 LAB
SL AMB  POCT GLUCOSE, UA: NEGATIVE
SL AMB POCT URINE PROTEIN: NEGATIVE

## 2020-02-20 PROCEDURE — G0145 SCR C/V CYTO,THINLAYER,RESCR: HCPCS | Performed by: STUDENT IN AN ORGANIZED HEALTH CARE EDUCATION/TRAINING PROGRAM

## 2020-02-20 PROCEDURE — 87491 CHLMYD TRACH DNA AMP PROBE: CPT | Performed by: STUDENT IN AN ORGANIZED HEALTH CARE EDUCATION/TRAINING PROGRAM

## 2020-02-20 PROCEDURE — PNV: Performed by: STUDENT IN AN ORGANIZED HEALTH CARE EDUCATION/TRAINING PROGRAM

## 2020-02-20 PROCEDURE — 87591 N.GONORRHOEAE DNA AMP PROB: CPT | Performed by: STUDENT IN AN ORGANIZED HEALTH CARE EDUCATION/TRAINING PROGRAM

## 2020-02-20 NOTE — PROGRESS NOTES
Initial Prenatal Appointment - Obstetrics   Zia Mullins 27 y o  female MRN: [id number]  [facility]  Encounter: [id number]    Chief Complaint   Patient presents with    Initial Prenatal Visit     Patient is here for her Initial Prenatal visit she is    Patient is 12 weeks 4 days at today's visit   Patient is A negative blood type   History of Present Illness     Zia Mullins is a 27 y o  female  at 13w2d Estimated Date of Delivery: 20 by 7 5/7 week ultrasound on 2020, consistent with LMP (Patient's last menstrual period was 2019 )  She presents for her initial prenatal appointment  She is accompanied with her  [place] Son  She denies leakage of fluid, vaginal bleeding, or cramping  Concerns for her today: interested in if able to bowl and eat potato chips    PRENATAL ISSUES  1  History LEEP  2  History of smoking, exposed to second hand smoke  3  FOB with family history of Factor V, has not been tested  4  BMI 30  5  Rh negative    REVIEW OF SYSTEMS  CONSTITUTIONAL:  No weight loss, fever, chills, weakness  HEENT: No visual loss, blurred vision  SKIN: No rash or itching  CARDIOVASCULAR: No chest pain, chest pressure, or chest discomfort  RESPIRATORY: No shortness of breath, cough or sputum  GASTROINTESTINAL: No nausea, emesis, or diarrhea  NEUROLOGICAL: No headache, dizziness, syncope, paralysis  MUSCUOSKELETAL: No muscle, back pain, joint stiffness or bruising  INFECTIOUS: No fever, chills  PSYCHIATRIC: No disorder of thought or mood  HEMATOLOGIC: No easy bruising or bleeding  GYN: No vaginal bleeding   Occasional cramping    OB History        2    Para   0    Term   0       0    AB   1    Living   0       SAB   1    TAB   0    Ectopic   0    Multiple   0    Live Births   0                 #: 1, Date: , Sex: None, Weight: None, GA: 6w0d, Delivery: None, Apgar1: None, Apgar5: None, Living: None, Birth Comments: None    #: 2, Date: None, Sex: None, Weight: None, GA: None, Delivery: None, Apgar1: None, Apgar5: None, Living: None, Birth Comments: None      G1 SAB no intervention, did not receive Rhogam          GYN History  Patient's last menstrual period was 2019  Positive history abnormal Pap smears  History of LEEP  No history STD (gonorrhea, chlamydia, herpes)      Past Medical History:   Diagnosis Date    Abnormal Pap smear of cervix     Anxiety        Patient Active Problem List   Diagnosis    Amenorrhea    Rh negative state in antepartum period    History of loop electrical excision procedure (LEEP)    Former smoker       Past Surgical History:   Procedure Laterality Date    CERVICAL BIOPSY  W/ LOOP ELECTRODE EXCISION      WISDOM TOOTH EXTRACTION  2017       Family History   Problem Relation Age of Onset    Thyroid disease Mother     Asthma Mother     Thyroid disease Father     Thyroid disease Maternal Grandmother     Alzheimer's disease Maternal Grandfather     Heart disease Paternal Grandmother        Social History   Social History     Substance and Sexual Activity   Alcohol Use Not Currently    Frequency: 4 or more times a week    Drinks per session: 1 or 2    Comment: daily-stopped with confirmed preg      Social History     Substance and Sexual Activity   Drug Use Never     Social History     Tobacco Use   Smoking Status Former Smoker    Packs/day: 0 50    Years: 10 00    Pack years: 5 00    Types: Cigarettes    Start date: 2005    Last attempt to quit: 2020    Years since quittin 1   Smokeless Tobacco Never Used   Tobacco Comment    quit with confirmed preg        Current Outpatient Medications:     Prenatal Vit-Fe Fumarate-FA (PRENATAL VITAMIN PLUS LOW IRON PO), Take by mouth, Disp: , Rfl:     Allergies   Allergen Reactions    Penicillins Vomiting       Objective   Vitals: Blood pressure 118/70, height 5' 4" (1 626 m), weight 86 kg (189 lb 9 6 oz), last menstrual period 2019  Body mass index is 32 54 kg/m²  General: NAD, AAOx3  Neck: supple, no thyromegaly or thyroid nodules appreciated  Heart: RRR  Lungs: CTAB  Breast: nipples everted bilaterally, no skin changes  No dimpling, redness, or erythema  No breast masses or axillary masses bilaterally  Abdomen: soft, non-distended, non tender to palpation  Extremities: non-tender to palpation  Speculum exam: Normal appearing external genitalia, normal hair distribution  Urethra well-suspended, no clitoromegaly noted  Vagina pink, moist, well-rugated  physiologic discharge noted  Cervix without lesion, nulliparous appearing  not dilated  No blood in vaginal vault    Pelvic exam: No cervical motion tenderness  No adnexal masses or tenderness  anteverted uterus, 12 week size  Fetal heart rate 167 bpm by M mode  Lab Results:   Initial Prenatal on 2020   Component Date Value    POCT URINE PROTEIN 2020 negative     GLUCOSE, UA 2020 negative         Assessment/Plan     27 y o   12w4d here for initial prenatal appointment      Diagnoses and all orders for this visit:    Encounter for supervision of normal first pregnancy in first trimester  -     POCT urine dip  -     Liquid-based pap, screening  -     Chlamydia/GC amplified DNA by PCR   - Encouraged FOB to get blood work to determine if has Factor V gene - if positive would encourage Viv Campbell to also be tested   - Okay for bowling, to avoid abdominal trauma (uses 6 lb ball)   - Okay for potato chips - advised to avoid raw foods, deli meat, albacore tuna  History of loop electrical excision procedure (LEEP)   - Pap smear today   - Reviewed need for cervical length at 16 weeks, possibility of shortened cervix, risk miscarriage, and possible need for intervention  Rh negative state in antepartum period   - Understands needs Rhogam at 28 weeks, also if any vagina lbleeding   - Did not receive Rhogam after miscarriage previously - negative antibody screen  Former smoker   - Congratulated on quitting   - Encouraged FOB to quit as well to avoid second hand smoke, has been cutting back  BMI 30 0-30 9,adult   - Recommended weight gain 11-20 pounds total   - Encouraged healthy eating   - Discussed possibility of starting baby aspirin 162 mg for prevention preeclampsia, will consider    For routine OB follow up

## 2020-02-21 ENCOUNTER — ROUTINE PRENATAL (OUTPATIENT)
Dept: PERINATAL CARE | Facility: OTHER | Age: 30
End: 2020-02-21
Payer: COMMERCIAL

## 2020-02-21 VITALS
SYSTOLIC BLOOD PRESSURE: 115 MMHG | DIASTOLIC BLOOD PRESSURE: 70 MMHG | HEIGHT: 64 IN | HEART RATE: 83 BPM | WEIGHT: 190.6 LBS | BODY MASS INDEX: 32.54 KG/M2

## 2020-02-21 DIAGNOSIS — Z34.01 ENCOUNTER FOR SUPERVISION OF NORMAL FIRST PREGNANCY IN FIRST TRIMESTER: ICD-10-CM

## 2020-02-21 DIAGNOSIS — Z3A.12 12 WEEKS GESTATION OF PREGNANCY: ICD-10-CM

## 2020-02-21 DIAGNOSIS — O99.211 MATERNAL OBESITY, ANTEPARTUM, FIRST TRIMESTER: Primary | ICD-10-CM

## 2020-02-21 DIAGNOSIS — Z98.890 H/O LEEP (LOOP ELECTROSURGICAL EXCISION PROCEDURE) OF CERVIX COMPLICATING PREGNANCY, FIRST TRIMESTER: ICD-10-CM

## 2020-02-21 DIAGNOSIS — Z36.82 ENCOUNTER FOR ANTENATAL SCREENING FOR NUCHAL TRANSLUCENCY: ICD-10-CM

## 2020-02-21 DIAGNOSIS — O34.41 H/O LEEP (LOOP ELECTROSURGICAL EXCISION PROCEDURE) OF CERVIX COMPLICATING PREGNANCY, FIRST TRIMESTER: ICD-10-CM

## 2020-02-21 LAB
C TRACH DNA SPEC QL NAA+PROBE: NEGATIVE
N GONORRHOEA DNA SPEC QL NAA+PROBE: NEGATIVE

## 2020-02-21 PROCEDURE — 76813 OB US NUCHAL MEAS 1 GEST: CPT | Performed by: OBSTETRICS & GYNECOLOGY

## 2020-02-21 PROCEDURE — 99201 PR OFFICE OUTPATIENT NEW 10 MINUTES: CPT | Performed by: OBSTETRICS & GYNECOLOGY

## 2020-02-21 RX ORDER — ASPIRIN 81 MG/1
162 TABLET, CHEWABLE ORAL DAILY
Qty: 180 TABLET | Refills: 1 | Status: SHIPPED | OUTPATIENT
Start: 2020-02-21 | End: 2020-05-05 | Stop reason: SDUPTHER

## 2020-02-21 NOTE — LETTER
February 21, 2020     [address]  [address] [address]    Patient: Ajit Reina   YOB: 1990   Date of Visit: 2/21/2020       Dear Dr Vahid Ash: Thank you for referring Ajit Reina to me for evaluation  Below are my notes for this consultation  If you have questions, please do not hesitate to call me  I look forward to following your patient along with you  Sincerely,        Dyllan Bear MD        CC: No Recipients  Dyllan Bear MD  2/21/2020  8:22 AM  Sign at close encounter  Please refer to the [facility] ultrasound report in Ob Procedures for additional information regarding the visit to the [facility]  today

## 2020-02-21 NOTE — PROGRESS NOTES
Please refer to the [facility] ultrasound report in Ob Procedures for additional information regarding the visit to the [facility]  today

## 2020-02-22 LAB — MISSING INFO: NORMAL

## 2020-02-24 ENCOUNTER — DOCUMENTATION (OUTPATIENT)
Dept: PERINATAL CARE | Facility: CLINIC | Age: 30
End: 2020-02-24

## 2020-02-24 NOTE — PROGRESS NOTES
I received notification from "[employer] that there was missing information on Anuja's lab slip for her part 1 sequential screen  Per Sandy Paulino at [facility] there was no nuchal translucency on the lab slip  I updated this information and Sandy Paulino states that the result will now be processed

## 2020-02-25 LAB
# FETUSES US: 1
AGE: NORMAL
B-HCG ADJ MOM SERPL: 1.67
B-HCG SERPL-ACNC: 118.4 IU/ML
COLLECT DATE: NORMAL
CURRENT SMOKER: NO
DONATED EGG PATIENT QL: NO
FET CRL US.MEAS: 64 MM
FET CRL US.MEAS: NORMAL MM
FET NUCHAL FOLD MOM THICKNESS US.MEAS: 1.04
FET NUCHAL FOLD THICKNESS US.MEAS: 1.5 MM
FET NUCHAL FOLD THICKNESS US.MEAS: NORMAL MM
FET TS 21 RISK FROM MAT AGE: NORMAL
GA CLIN EST: 12.6 WK
GA METHOD: NORMAL
HX OF NTD NARR: NO
HX OF TRISOMY 21 NARR: NO
IDDM PATIENT QL: NO
INTEGRATED SCN PATIENT-IMP: NORMAL
PAPP-A MOM SERPL: 1.28
PAPP-A SERPL-MCNC: 849 NG/ML
PHYSICIAN NPI: NORMAL
SL AMB NASAL BONE: PRESENT
SL AMB NTQR LOCATION ID#: NORMAL
SL AMB NTQR READING PHYS ID#: NORMAL
SL AMB REFERRING PHYSICIAN NAME: NORMAL
SL AMB REFERRING PHYSICIAN PHONE: NORMAL
SL AMB REPEAT SPECIMEN: NO
SL AMB TWIN B NASAL BONE: NORMAL
SONOGRAPHER NAME: NORMAL
SONOGRAPHER: NORMAL
SONOGRAPHER: NORMAL
TS 18 RISK FETUS: NORMAL
TS 21 RISK FETUS: NORMAL
US DATE: NORMAL
US FETUSES STUDY [IMP]: NORMAL

## 2020-02-26 ENCOUNTER — TELEPHONE (OUTPATIENT)
Dept: PERINATAL CARE | Facility: OTHER | Age: 30
End: 2020-02-26

## 2020-02-26 LAB
LAB AP GYN PRIMARY INTERPRETATION: NORMAL
LAB AP LMP: NORMAL
Lab: NORMAL

## 2020-02-26 NOTE — TELEPHONE ENCOUNTER
Spoke with Sharyn Robles Step wise 1 result and instruction for step wise 2 provided  She verbalized understandings of all and had no question at this time  Address confirmed, TRF mailed

## 2020-02-26 NOTE — TELEPHONE ENCOUNTER
----- Message from Hank Armijo MD sent at 2/25/2020 12:15 PM EST -----  I reviewed the lab study today and the results are normal

## 2020-03-18 ENCOUNTER — ROUTINE PRENATAL (OUTPATIENT)
Dept: OBGYN CLINIC | Facility: CLINIC | Age: 30
End: 2020-03-18

## 2020-03-18 VITALS — WEIGHT: 192 LBS | DIASTOLIC BLOOD PRESSURE: 74 MMHG | SYSTOLIC BLOOD PRESSURE: 116 MMHG | BODY MASS INDEX: 33.48 KG/M2

## 2020-03-18 DIAGNOSIS — Z34.02 ENCOUNTER FOR SUPERVISION OF NORMAL FIRST PREGNANCY IN SECOND TRIMESTER: Primary | ICD-10-CM

## 2020-03-18 DIAGNOSIS — Z98.890 HISTORY OF LOOP ELECTRICAL EXCISION PROCEDURE (LEEP): ICD-10-CM

## 2020-03-18 DIAGNOSIS — O26.899 RH NEGATIVE STATE IN ANTEPARTUM PERIOD: ICD-10-CM

## 2020-03-18 DIAGNOSIS — Z67.91 RH NEGATIVE STATE IN ANTEPARTUM PERIOD: ICD-10-CM

## 2020-03-18 LAB
SL AMB  POCT GLUCOSE, UA: NORMAL
SL AMB POCT URINE PROTEIN: NORMAL

## 2020-03-18 PROCEDURE — PNV: Performed by: NURSE PRACTITIONER

## 2020-03-18 NOTE — PATIENT INSTRUCTIONS
Pregnancy at 15 to 18 Weeks   AMBULATORY CARE:   What changes are happening to your body:  Now that you are in your second trimester, you have more energy  You may also feel hungrier than usual  You may start to experience other symptoms, such as heartburn or dizziness  You may be gaining about ½ to 1 pound a week, and your pregnancy is beginning to show  You may need to start wearing maternity clothes  Seek care immediately if:   · You have pain or cramping in your abdomen or low back  · You have heavy vaginal bleeding or clotting  · You pass material that looks like tissue or large clots  Collect the material and bring it with you  Contact your healthcare provider if:   · You cannot keep food or drinks down, and you are losing weight  · You have light bleeding  · You have chills or a fever  · You have vaginal itching, burning, or pain  · You have yellow, green, white, or foul-smelling vaginal discharge  · You have pain or burning when you urinate, less urine than usual, or pink or bloody urine  · You have questions or concerns about your condition or care  How to care for yourself at this stage of your pregnancy:   · Manage heartburn  by eating 4 or 5 small meals each day instead of large meals  Avoid spicy foods  Avoid eating right before bedtime  · Manage nausea and vomiting  Avoid fatty and spicy foods  Eat small meals throughout the day instead of large meals  [place] may help to decrease nausea  Ask your healthcare provider about other ways of decreasing nausea and vomiting  · Eat a variety of healthy foods  Healthy foods include fruits, vegetables, whole-grain breads, low-fat dairy foods, beans, lean meats, and fish  Drink liquids as directed  Ask how much liquid to drink each day and which liquids are best for you  Limit caffeine to less than 200 milligrams each day  Limit your intake of fish to 2 servings each week   Choose fish low in mercury such as canned light tuna, shrimp, salmon, cod, or tilapia  Do not  eat fish high in mercury such as swordfish, tilefish, janet mackerel, and shark  · Take prenatal vitamins as directed  Your need for certain vitamins and minerals, such as folic acid, increases during pregnancy  Prenatal vitamins provide some of the extra vitamins and minerals you need  Prenatal vitamins may also help to decrease the risk of certain birth defects  · Do not smoke  If you smoke, it is never too late to quit  Smoking increases your risk of a miscarriage and other health problems during your pregnancy  Smoking can cause your baby to be born too early or weigh less at birth  Ask your healthcare provider for information if you need help quitting  · Do not drink alcohol  Alcohol passes from your body to your baby through the placenta  It can affect your baby's brain development and cause fetal alcohol syndrome (FAS)  FAS is a group of conditions that causes mental, behavior, and growth problems  · Talk to your healthcare provider before you take any medicines  Many medicines may harm your baby if you take them when you are pregnant  Do not take any medicines, vitamins, herbs, or supplements without first talking to your healthcare provider  Never use illegal or street drugs (such as marijuana or cocaine) while you are pregnant  Safety tips during pregnancy:   · Avoid hot tubs and saunas  Do not use a hot tub or sauna while you are pregnant, especially during your first trimester  Hot tubs and saunas may raise your baby's temperature and increase the risk of birth defects  · Avoid toxoplasmosis  This is an infection caused by eating raw meat or being around infected cat feces  It can cause birth defects, miscarriages, and other problems  Wash your hands after you touch raw meat  Make sure any meat is well-cooked before you eat it  Avoid raw eggs and unpasteurized milk   Use gloves or ask someone else to clean your cat's litter box while you are pregnant  Changes that are happening with your baby:  By 18 weeks, your baby may be about 6 inches long from the top of the head to the rump (baby's bottom)  Your baby may weigh about 11 ounces  You may be able to feel your baby's movement at about 18 weeks or later  The first movements may not be that noticeable  They may feel like a fluttering sensation  Your baby also makes sucking movements and can hear certain sounds  What you need to know about prenatal care:  During the first 28 weeks of your pregnancy, you will see your healthcare provider once a month  Your healthcare provider will check your blood pressure and weight  You may also need any of the following:  · A urine test  may also be done to check for sugar and protein  These can be signs of gestational diabetes or infection  · A blood test  may be done to check for anemia (low iron level)  · Fundal height check  is a measurement of your uterus to check your baby's growth  This number is usually the same as the number of weeks that you have been pregnant  · An ultrasound  may be done to check your baby's development  Your healthcare provider may be able to tell you what your baby's gender is during the ultrasound  · Your baby's heart rate  will be checked  © 2017 [address] Information is for End User's use only and may not be sold, redistributed or otherwise used for commercial purposes  All illustrations and images included in CareNotes® are the copyrighted property of [employer]  or [employer]  The above information is an  only  It is not intended as medical advice for individual conditions or treatments  Talk to your doctor, nurse or pharmacist before following any medical regimen to see if it is safe and effective for you

## 2020-03-18 NOTE — PROGRESS NOTES
Problem List Items Addressed This Visit        Other    Rh negative state in antepartum period      Other Visit Diagnoses     Encounter for supervision of normal first pregnancy in second trimester    -  Primary    History of loop electrical excision procedure (LEEP)        BMI 30 0-30 9,adult            Here with FOB  Feels well  Denies LOF/CTX/VB  L2 appt on 4/16  Declined flu shot  Sequential screen in progress  No concerns

## 2020-03-20 LAB
# FETUSES US: 1
AFP ADJ MOM SERPL: 1.22
AFP SERPL-MCNC: 34.6 NG/ML
B-HCG ADJ MOM SERPL: 1.08
B-HCG SERPL-ACNC: 31.4 IU/ML
COLLECT DATE: NORMAL
CURRENT SMOKER: NO
FET CRL US.MEAS: 64 MM
FET NUCHAL FOLD MOM THICKNESS US.MEAS: 1.04
FET NUCHAL FOLD THICKNESS US.MEAS: 1.5 MM
FET TS 21 RISK FROM MAT AGE: NORMAL
GA CLIN EST: 16.1 WK
HX OF NTD NARR: NO
IDDM PATIENT QL: NO
INHIBIN A ADJ MOM SERPL: 1.63
INHIBIN A SERPL-MCNC: 247 PG/ML
INTEGRATED SCN PATIENT-IMP: NORMAL
NEURAL TUBE DEFECT RISK FETUS: NORMAL %
PAPP-A MOM SERPL: 1.28
PAPP-A SERPL-MCNC: 849 NG/ML
PHYSICIAN NPI: NORMAL
SL AMB NASAL BONE: PRESENT
SL AMB REFERRING PHYSICIAN NAME: NORMAL
SL AMB REFERRING PHYSICIAN PHONE: NORMAL
SL AMB REPEAT SPECIMEN: NO
SL AMB SPECIMEN # FROM PART 1: NORMAL
SL AMB TWIN B NASAL BONE: NORMAL
TS 18 RISK FETUS: NORMAL
TS 21 RISK FETUS: NORMAL
U ESTRIOL ADJ MOM SERPL: 1.16
U ESTRIOL SERPL-MCNC: 0.85 NG/ML
US DATE: NORMAL

## 2020-03-23 ENCOUNTER — TELEPHONE (OUTPATIENT)
Dept: OBGYN CLINIC | Facility: CLINIC | Age: 30
End: 2020-03-23

## 2020-03-23 NOTE — TELEPHONE ENCOUNTER
Pt contacted, pt doesn't believe she broke her water  Pt advised as directed  Pt was grateful for the call

## 2020-03-23 NOTE — TELEPHONE ENCOUNTER
----- Message from Anurag Walters sent at 3/22/2020 11:40 PM EDT -----  Regarding: Non-Urgent Medical Question  Contact: [phone]  Just turned 17 weeks today and was wondering if this is normal   Happened last night   Looking forward to hearing back from you    Thank you sorry for the picture

## 2020-03-23 NOTE — TELEPHONE ENCOUNTER
Pt returned my call and stated no cramping since lat night  Pt stated no v/b, no leakage of fluids  Pt stated she cant tell movements yet  Pt stated good appetite and able to keep fluids and food down, maybe once a month she vomits

## 2020-03-24 ENCOUNTER — TELEPHONE (OUTPATIENT)
Dept: OBGYN CLINIC | Facility: CLINIC | Age: 30
End: 2020-03-24

## 2020-03-24 ENCOUNTER — ROUTINE PRENATAL (OUTPATIENT)
Dept: OBGYN CLINIC | Facility: CLINIC | Age: 30
End: 2020-03-24

## 2020-03-24 ENCOUNTER — TELEPHONE (OUTPATIENT)
Dept: PERINATAL CARE | Facility: OTHER | Age: 30
End: 2020-03-24

## 2020-03-24 VITALS — WEIGHT: 193 LBS | SYSTOLIC BLOOD PRESSURE: 102 MMHG | DIASTOLIC BLOOD PRESSURE: 64 MMHG | BODY MASS INDEX: 33.65 KG/M2

## 2020-03-24 DIAGNOSIS — Z34.02 ENCOUNTER FOR SUPERVISION OF NORMAL FIRST PREGNANCY IN SECOND TRIMESTER: Primary | ICD-10-CM

## 2020-03-24 LAB
SL AMB  POCT GLUCOSE, UA: NEGATIVE
SL AMB POCT URINE PROTEIN: NEGATIVE

## 2020-03-24 PROCEDURE — PNV: Performed by: OBSTETRICS & GYNECOLOGY

## 2020-03-24 NOTE — TELEPHONE ENCOUNTER
Called patient back to see how she is feeling  States cramping continues  Drank 4 bottles of water  Now feelsconstant  More frequent when walking/standing

## 2020-03-24 NOTE — TELEPHONE ENCOUNTER
Left message of normal Stepwise final result and phone number to call back at the number provided on communication consent

## 2020-03-24 NOTE — TELEPHONE ENCOUNTER
Incoming call from patient  Called yesterday regarding increase in vaginal discharge  States she has been having lower pelvic cramping x 2 days every 10 minutes  Denies vb,lof  Did not mention this when she called yesterday  States was sick over the weekend with GI symptoms and may be dehydrated  Advised to drink large amount of water/gatorade and see if symptoms improve  Will route to provider to advise

## 2020-03-24 NOTE — TELEPHONE ENCOUNTER
Spoke with on call provider, CG  Advises patient be seen in office  Patient coming from   Appt scheduled in [place] today  COVID 19 screening done  Aware of visitor policy

## 2020-03-24 NOTE — TELEPHONE ENCOUNTER
----- Message from Ab Betancourt MD sent at 3/23/2020  2:58 PM EDT -----  I reviewed the lab study today and the results are normal

## 2020-04-15 ENCOUNTER — TELEPHONE (OUTPATIENT)
Dept: PERINATAL CARE | Facility: CLINIC | Age: 30
End: 2020-04-15

## 2020-04-16 ENCOUNTER — ROUTINE PRENATAL (OUTPATIENT)
Dept: PERINATAL CARE | Facility: OTHER | Age: 30
End: 2020-04-16
Payer: COMMERCIAL

## 2020-04-16 VITALS
HEART RATE: 90 BPM | SYSTOLIC BLOOD PRESSURE: 108 MMHG | WEIGHT: 195.4 LBS | BODY MASS INDEX: 34.62 KG/M2 | DIASTOLIC BLOOD PRESSURE: 73 MMHG | HEIGHT: 63 IN

## 2020-04-16 DIAGNOSIS — Z3A.20 20 WEEKS GESTATION OF PREGNANCY: ICD-10-CM

## 2020-04-16 DIAGNOSIS — O99.212 MATERNAL OBESITY, ANTEPARTUM, SECOND TRIMESTER: Primary | ICD-10-CM

## 2020-04-16 PROCEDURE — 76811 OB US DETAILED SNGL FETUS: CPT | Performed by: OBSTETRICS & GYNECOLOGY

## 2020-04-16 PROCEDURE — 99212 OFFICE O/P EST SF 10 MIN: CPT | Performed by: OBSTETRICS & GYNECOLOGY

## 2020-04-17 ENCOUNTER — ROUTINE PRENATAL (OUTPATIENT)
Dept: OBGYN CLINIC | Facility: CLINIC | Age: 30
End: 2020-04-17

## 2020-04-17 VITALS — WEIGHT: 195.2 LBS | SYSTOLIC BLOOD PRESSURE: 122 MMHG | BODY MASS INDEX: 34.58 KG/M2 | DIASTOLIC BLOOD PRESSURE: 78 MMHG

## 2020-04-17 DIAGNOSIS — O99.212 MATERNAL OBESITY, ANTEPARTUM, SECOND TRIMESTER: ICD-10-CM

## 2020-04-17 DIAGNOSIS — Z67.91 RH NEGATIVE STATE IN ANTEPARTUM PERIOD: ICD-10-CM

## 2020-04-17 DIAGNOSIS — O26.899 RH NEGATIVE STATE IN ANTEPARTUM PERIOD: ICD-10-CM

## 2020-04-17 DIAGNOSIS — Z34.02 ENCOUNTER FOR SUPERVISION OF NORMAL FIRST PREGNANCY IN SECOND TRIMESTER: Primary | ICD-10-CM

## 2020-04-17 PROCEDURE — PNV: Performed by: NURSE PRACTITIONER

## 2020-04-22 ENCOUNTER — TELEPHONE (OUTPATIENT)
Dept: OBGYN CLINIC | Facility: CLINIC | Age: 30
End: 2020-04-22

## 2020-04-28 ENCOUNTER — TELEPHONE (OUTPATIENT)
Dept: OTHER | Facility: OTHER | Age: 30
End: 2020-04-28

## 2020-04-28 ENCOUNTER — HOSPITAL ENCOUNTER (OUTPATIENT)
Facility: HOSPITAL | Age: 30
Discharge: HOME | End: 2020-04-28
Attending: OBSTETRICS & GYNECOLOGY | Admitting: OBSTETRICS & GYNECOLOGY
Payer: COMMERCIAL

## 2020-04-28 PROBLEM — R10.2 PELVIC PAIN AFFECTING PREGNANCY IN SECOND TRIMESTER, ANTEPARTUM: Status: ACTIVE | Noted: 2020-04-28

## 2020-04-28 PROBLEM — Z3A.22 22 WEEKS GESTATION OF PREGNANCY: Status: ACTIVE | Noted: 2020-04-28

## 2020-04-28 PROBLEM — O26.892 PELVIC PAIN AFFECTING PREGNANCY IN SECOND TRIMESTER, ANTEPARTUM: Status: ACTIVE | Noted: 2020-04-28

## 2020-04-28 PROCEDURE — 99214 OFFICE O/P EST MOD 30 MIN: CPT

## 2020-04-28 PROCEDURE — NC001 PR NO CHARGE: Performed by: STUDENT IN AN ORGANIZED HEALTH CARE EDUCATION/TRAINING PROGRAM

## 2020-04-28 PROCEDURE — 76817 TRANSVAGINAL US OBSTETRIC: CPT | Performed by: OBSTETRICS & GYNECOLOGY

## 2020-04-28 RX ORDER — ONDANSETRON 4 MG/1
8 TABLET, ORALLY DISINTEGRATING ORAL ONCE
Status: DISCONTINUED | OUTPATIENT
Start: 2020-04-28 | End: 2020-04-29 | Stop reason: HOSPADM

## 2020-04-29 VITALS
SYSTOLIC BLOOD PRESSURE: 109 MMHG | TEMPERATURE: 98.1 F | HEART RATE: 88 BPM | DIASTOLIC BLOOD PRESSURE: 67 MMHG | RESPIRATION RATE: 20 BRPM

## 2020-04-29 PROCEDURE — NC001 PR NO CHARGE: Performed by: OBSTETRICS & GYNECOLOGY

## 2020-05-05 DIAGNOSIS — Z3A.12 12 WEEKS GESTATION OF PREGNANCY: ICD-10-CM

## 2020-05-05 RX ORDER — ASPIRIN 81 MG/1
162 TABLET, CHEWABLE ORAL DAILY
Qty: 180 TABLET | Refills: 0 | Status: SHIPPED | OUTPATIENT
Start: 2020-05-05 | End: 2020-09-10 | Stop reason: ALTCHOICE

## 2020-05-13 ENCOUNTER — TELEMEDICINE (OUTPATIENT)
Dept: OBGYN CLINIC | Facility: CLINIC | Age: 30
End: 2020-05-13

## 2020-05-13 VITALS — DIASTOLIC BLOOD PRESSURE: 66 MMHG | SYSTOLIC BLOOD PRESSURE: 114 MMHG | BODY MASS INDEX: 35.57 KG/M2 | WEIGHT: 200.8 LBS

## 2020-05-13 DIAGNOSIS — O26.899 RH NEGATIVE STATE IN ANTEPARTUM PERIOD: Primary | ICD-10-CM

## 2020-05-13 DIAGNOSIS — Z34.02 ENCOUNTER FOR SUPERVISION OF NORMAL FIRST PREGNANCY IN SECOND TRIMESTER: ICD-10-CM

## 2020-05-13 DIAGNOSIS — Z67.91 RH NEGATIVE STATE IN ANTEPARTUM PERIOD: Primary | ICD-10-CM

## 2020-05-13 PROCEDURE — PNV: Performed by: STUDENT IN AN ORGANIZED HEALTH CARE EDUCATION/TRAINING PROGRAM

## 2020-06-01 ENCOUNTER — APPOINTMENT (OUTPATIENT)
Dept: LAB | Facility: CLINIC | Age: 30
End: 2020-06-01
Payer: COMMERCIAL

## 2020-06-01 DIAGNOSIS — Z34.02 ENCOUNTER FOR SUPERVISION OF NORMAL FIRST PREGNANCY IN SECOND TRIMESTER: ICD-10-CM

## 2020-06-01 LAB
BASOPHILS # BLD AUTO: 0.05 THOUSANDS/ΜL (ref 0–0.1)
BASOPHILS NFR BLD AUTO: 0 % (ref 0–1)
EOSINOPHIL # BLD AUTO: 0.3 THOUSAND/ΜL (ref 0–0.61)
EOSINOPHIL NFR BLD AUTO: 2 % (ref 0–6)
ERYTHROCYTE [DISTWIDTH] IN BLOOD BY AUTOMATED COUNT: 13 % (ref 11.6–15.1)
GLUCOSE 1H P 50 G GLC PO SERPL-MCNC: 110 MG/DL
HCT VFR BLD AUTO: 32.7 % (ref 34.8–46.1)
HGB BLD-MCNC: 10.6 G/DL (ref 11.5–15.4)
IMM GRANULOCYTES # BLD AUTO: 0.24 THOUSAND/UL (ref 0–0.2)
IMM GRANULOCYTES NFR BLD AUTO: 2 % (ref 0–2)
LYMPHOCYTES # BLD AUTO: 1.6 THOUSANDS/ΜL (ref 0.6–4.47)
LYMPHOCYTES NFR BLD AUTO: 11 % (ref 14–44)
MCH RBC QN AUTO: 31.4 PG (ref 26.8–34.3)
MCHC RBC AUTO-ENTMCNC: 32.4 G/DL (ref 31.4–37.4)
MCV RBC AUTO: 97 FL (ref 82–98)
MONOCYTES # BLD AUTO: 0.62 THOUSAND/ΜL (ref 0.17–1.22)
MONOCYTES NFR BLD AUTO: 4 % (ref 4–12)
NEUTROPHILS # BLD AUTO: 11.25 THOUSANDS/ΜL (ref 1.85–7.62)
NEUTS SEG NFR BLD AUTO: 81 % (ref 43–75)
NRBC BLD AUTO-RTO: 0 /100 WBCS
PLATELET # BLD AUTO: 387 THOUSANDS/UL (ref 149–390)
PMV BLD AUTO: 10.2 FL (ref 8.9–12.7)
RBC # BLD AUTO: 3.38 MILLION/UL (ref 3.81–5.12)
WBC # BLD AUTO: 14.06 THOUSAND/UL (ref 4.31–10.16)

## 2020-06-01 PROCEDURE — 36415 COLL VENOUS BLD VENIPUNCTURE: CPT

## 2020-06-01 PROCEDURE — 85025 COMPLETE CBC W/AUTO DIFF WBC: CPT

## 2020-06-01 PROCEDURE — 86592 SYPHILIS TEST NON-TREP QUAL: CPT

## 2020-06-01 PROCEDURE — 82950 GLUCOSE TEST: CPT

## 2020-06-02 LAB — RPR SER QL: NORMAL

## 2020-06-10 ENCOUNTER — ROUTINE PRENATAL (OUTPATIENT)
Dept: OBGYN CLINIC | Facility: CLINIC | Age: 30
End: 2020-06-10
Payer: COMMERCIAL

## 2020-06-10 VITALS — WEIGHT: 209.6 LBS | SYSTOLIC BLOOD PRESSURE: 110 MMHG | DIASTOLIC BLOOD PRESSURE: 80 MMHG | BODY MASS INDEX: 37.13 KG/M2

## 2020-06-10 DIAGNOSIS — Z67.91 RH NEGATIVE STATE IN ANTEPARTUM PERIOD: ICD-10-CM

## 2020-06-10 DIAGNOSIS — Z23 NEED FOR TDAP VACCINATION: ICD-10-CM

## 2020-06-10 DIAGNOSIS — O99.213 MATERNAL OBESITY, ANTEPARTUM, THIRD TRIMESTER: ICD-10-CM

## 2020-06-10 DIAGNOSIS — Z34.93 PREGNANCY, OBSTETRICAL CARE, THIRD TRIMESTER: Primary | ICD-10-CM

## 2020-06-10 DIAGNOSIS — O26.899 RH NEGATIVE STATE IN ANTEPARTUM PERIOD: ICD-10-CM

## 2020-06-10 LAB
SL AMB  POCT GLUCOSE, UA: NORMAL
SL AMB POCT URINE PROTEIN: NORMAL

## 2020-06-10 PROCEDURE — 90715 TDAP VACCINE 7 YRS/> IM: CPT | Performed by: STUDENT IN AN ORGANIZED HEALTH CARE EDUCATION/TRAINING PROGRAM

## 2020-06-10 PROCEDURE — 96372 THER/PROPH/DIAG INJ SC/IM: CPT | Performed by: STUDENT IN AN ORGANIZED HEALTH CARE EDUCATION/TRAINING PROGRAM

## 2020-06-10 PROCEDURE — 90471 IMMUNIZATION ADMIN: CPT | Performed by: STUDENT IN AN ORGANIZED HEALTH CARE EDUCATION/TRAINING PROGRAM

## 2020-06-10 PROCEDURE — PNV: Performed by: STUDENT IN AN ORGANIZED HEALTH CARE EDUCATION/TRAINING PROGRAM

## 2020-06-23 ENCOUNTER — ROUTINE PRENATAL (OUTPATIENT)
Dept: OBGYN CLINIC | Age: 30
End: 2020-06-23

## 2020-06-23 VITALS — BODY MASS INDEX: 37.91 KG/M2 | SYSTOLIC BLOOD PRESSURE: 116 MMHG | DIASTOLIC BLOOD PRESSURE: 60 MMHG | WEIGHT: 214 LBS

## 2020-06-23 DIAGNOSIS — Z67.91 RH NEGATIVE STATE IN ANTEPARTUM PERIOD: ICD-10-CM

## 2020-06-23 DIAGNOSIS — O26.899 RH NEGATIVE STATE IN ANTEPARTUM PERIOD: ICD-10-CM

## 2020-06-23 DIAGNOSIS — O99.213 MATERNAL OBESITY, ANTEPARTUM, THIRD TRIMESTER: ICD-10-CM

## 2020-06-23 DIAGNOSIS — Z34.83 PRENATAL CARE, SUBSEQUENT PREGNANCY, THIRD TRIMESTER: Primary | ICD-10-CM

## 2020-06-23 DIAGNOSIS — Z34.02 ENCOUNTER FOR SUPERVISION OF NORMAL FIRST PREGNANCY IN SECOND TRIMESTER: ICD-10-CM

## 2020-06-23 LAB
SL AMB  POCT GLUCOSE, UA: NEGATIVE
SL AMB POCT URINE PROTEIN: NEGATIVE

## 2020-06-23 PROCEDURE — PNV: Performed by: STUDENT IN AN ORGANIZED HEALTH CARE EDUCATION/TRAINING PROGRAM

## 2020-07-07 ENCOUNTER — ROUTINE PRENATAL (OUTPATIENT)
Dept: OBGYN CLINIC | Facility: CLINIC | Age: 30
End: 2020-07-07

## 2020-07-07 VITALS — SYSTOLIC BLOOD PRESSURE: 116 MMHG | BODY MASS INDEX: 38.62 KG/M2 | DIASTOLIC BLOOD PRESSURE: 72 MMHG | WEIGHT: 218 LBS

## 2020-07-07 DIAGNOSIS — Z34.02 ENCOUNTER FOR SUPERVISION OF NORMAL FIRST PREGNANCY IN SECOND TRIMESTER: ICD-10-CM

## 2020-07-07 DIAGNOSIS — O99.213 MATERNAL OBESITY, ANTEPARTUM, THIRD TRIMESTER: ICD-10-CM

## 2020-07-07 DIAGNOSIS — Z67.91 RH NEGATIVE STATE IN ANTEPARTUM PERIOD: ICD-10-CM

## 2020-07-07 DIAGNOSIS — O26.899 RH NEGATIVE STATE IN ANTEPARTUM PERIOD: ICD-10-CM

## 2020-07-07 DIAGNOSIS — Z34.93 PREGNANCY, OBSTETRICAL CARE, THIRD TRIMESTER: Primary | ICD-10-CM

## 2020-07-07 LAB
SL AMB  POCT GLUCOSE, UA: NORMAL
SL AMB POCT URINE PROTEIN: NORMAL

## 2020-07-07 PROCEDURE — PNV: Performed by: STUDENT IN AN ORGANIZED HEALTH CARE EDUCATION/TRAINING PROGRAM

## 2020-07-07 NOTE — ASSESSMENT & PLAN NOTE
28 yo  at 32+2 here with  for ob visit  Feeling well  No contractions, occasional pelvic pain  No leaking or bleeding  Good fetal movement  BL LE with swelling, nonpitting  BP normal and asymptomatic  Advised compression socks, foot elevation as able, hydration and low salt diet  Return in 2 wks

## 2020-07-07 NOTE — ASSESSMENT & PLAN NOTE
TWG 39#  Recommended goal 11-2  Reviewed importance in maintaining weight moving forward with walks and healthy diet  Discussed risks for HTN of pregnancy and increased need for cs or problems with delivery due to excess weight gain

## 2020-07-07 NOTE — PROGRESS NOTES
Maternal obesity, antepartum, third trimester  TWG 39#  Recommended goal 11-2  Reviewed importance in maintaining weight moving forward with walks and healthy diet  Discussed risks for HTN of pregnancy and increased need for cs or problems with delivery due to excess weight gain  Rh negative state in antepartum period  Rhogam 6/10    Encounter for supervision of normal first pregnancy in second trimester  26 yo  at 32+2 here with  for ob visit  Feeling well  No contractions, occasional pelvic pain  No leaking or bleeding  Good fetal movement  BL LE with swelling, nonpitting  BP normal and asymptomatic  Advised compression socks, foot elevation as able, hydration and low salt diet  Return in 2 wks

## 2020-07-09 ENCOUNTER — TELEPHONE (OUTPATIENT)
Dept: PERINATAL CARE | Facility: OTHER | Age: 30
End: 2020-07-09

## 2020-07-09 NOTE — TELEPHONE ENCOUNTER
Attempted to reach patient by phone and left voicemail to confirm appointment for MFM ultrasound  1 support person ( must be over the age of 15) may accompany you for your appointment  If you or your support person have traveled outside the state in the past 2 weeks, please call our office today #[phone]  You and your support person must wear a mask ,covering nose and mouth,during your entire visit  You and your support person will be screened upon arrival   IF not feeling well- cough, fever, shortness of breath or any flu like symptoms contact your primary care physician or [address] [place]  Please call our office prior to entering the building  Check in and rooming questions will be done via phone  Inside office # provided:  [place] Conde line: [phone]  [place] line:  [phone]  Oli Grigsby line:  [phone] line:  [phone]  Delicia Espinoza line:  [phone]  [place] line:  [phone]    [facility] does not allow cell phone use, recording device or streaming during ultrasound     Any questions with these instructions please call Maternal Fetal Medicine nurse line today @ # [phone]

## 2020-07-10 ENCOUNTER — DOCUMENTATION (OUTPATIENT)
Dept: LABOR AND DELIVERY | Facility: HOSPITAL | Age: 30
End: 2020-07-10

## 2020-07-10 ENCOUNTER — TELEPHONE (OUTPATIENT)
Dept: OTHER | Facility: OTHER | Age: 30
End: 2020-07-10

## 2020-07-10 ENCOUNTER — ULTRASOUND (OUTPATIENT)
Dept: PERINATAL CARE | Facility: OTHER | Age: 30
End: 2020-07-10
Payer: COMMERCIAL

## 2020-07-10 VITALS
WEIGHT: 218.2 LBS | TEMPERATURE: 95.4 F | SYSTOLIC BLOOD PRESSURE: 113 MMHG | DIASTOLIC BLOOD PRESSURE: 73 MMHG | HEIGHT: 64 IN | HEART RATE: 86 BPM | BODY MASS INDEX: 37.25 KG/M2

## 2020-07-10 DIAGNOSIS — IMO0002 EVALUATE ANATOMY NOT SEEN ON PRIOR SONOGRAM: ICD-10-CM

## 2020-07-10 DIAGNOSIS — Z36.89 ENCOUNTER FOR ULTRASOUND TO ASSESS FETAL GROWTH: ICD-10-CM

## 2020-07-10 DIAGNOSIS — O99.213 MATERNAL OBESITY, ANTEPARTUM, THIRD TRIMESTER: ICD-10-CM

## 2020-07-10 DIAGNOSIS — Z3A.32 32 WEEKS GESTATION OF PREGNANCY: Primary | ICD-10-CM

## 2020-07-10 PROBLEM — Z34.02 ENCOUNTER FOR SUPERVISION OF NORMAL FIRST PREGNANCY IN SECOND TRIMESTER: Status: RESOLVED | Noted: 2020-04-17 | Resolved: 2020-07-10

## 2020-07-10 PROBLEM — N91.2 AMENORRHEA: Status: RESOLVED | Noted: 2020-01-17 | Resolved: 2020-07-10

## 2020-07-10 PROCEDURE — 76816 OB US FOLLOW-UP PER FETUS: CPT | Performed by: OBSTETRICS & GYNECOLOGY

## 2020-07-10 PROCEDURE — 99211 OFF/OP EST MAY X REQ PHY/QHP: CPT | Performed by: OBSTETRICS & GYNECOLOGY

## 2020-07-10 NOTE — PROGRESS NOTES
[address]: Ms Raisa Cedeno was seen today at 32w5d for fetal growth and followup missed anatomy ultrasound  See ultrasound report under "OB Procedures" tab    Please don't hesitate to contact our office with any concerns or questions   -Jacqueline Naik MD

## 2020-07-11 NOTE — PROGRESS NOTES
Playing with dog, who put all weight (75#) on abd  No bleeding, mild cramping unchanged from baseline, baby moving well at this time   Reassured and precautions discussed

## 2020-07-17 ENCOUNTER — TELEPHONE (OUTPATIENT)
Dept: OBGYN CLINIC | Facility: CLINIC | Age: 30
End: 2020-07-17

## 2020-07-17 NOTE — TELEPHONE ENCOUNTER
----- Message from Florina Zuniga sent at 7/16/2020  4:51 PM EDT -----  Regarding: Non-Urgent Medical Question  Contact: [phone]  The last 2 days I have been experiencing period like cramps in my lower pelvic area accompanied by a few stabbing like cramps   And today I have felt a decrease in movement with my child   I can feel her every once in a while and she usually has strong movements and rolls and today is very light that unless I am touching my stomach i can not feel her move like I usually can   I am currently at work so I won't be able to answer a phone call  for about 2 hours   I just want to make sure everything seems ok and that I may just be over reacting because I am getting so close to the finish line

## 2020-07-17 NOTE — TELEPHONE ENCOUNTER
Spoke to Sveta Aguiar confirms that she has good fetal movement all day long and we reviewed daily fetal kick counts  She was just concerned that previously she felt like baby was "doing acrobatics" but now just feels less active movements  We discussed about the baby growing and there being less space for them  She has no other questions and has her next appt on Monday-knows to call us if something changes

## 2020-07-20 ENCOUNTER — ROUTINE PRENATAL (OUTPATIENT)
Dept: OBGYN CLINIC | Facility: CLINIC | Age: 30
End: 2020-07-20

## 2020-07-20 VITALS
HEIGHT: 63 IN | BODY MASS INDEX: 39.87 KG/M2 | SYSTOLIC BLOOD PRESSURE: 128 MMHG | TEMPERATURE: 97.9 F | DIASTOLIC BLOOD PRESSURE: 72 MMHG | WEIGHT: 225 LBS

## 2020-07-20 DIAGNOSIS — Z67.91 RH NEGATIVE STATE IN ANTEPARTUM PERIOD: ICD-10-CM

## 2020-07-20 DIAGNOSIS — O26.899 RH NEGATIVE STATE IN ANTEPARTUM PERIOD: ICD-10-CM

## 2020-07-20 DIAGNOSIS — Z34.03 ENCOUNTER FOR SUPERVISION OF NORMAL FIRST PREGNANCY IN THIRD TRIMESTER: Primary | ICD-10-CM

## 2020-07-20 DIAGNOSIS — O99.213 MATERNAL OBESITY, ANTEPARTUM, THIRD TRIMESTER: ICD-10-CM

## 2020-07-20 LAB
SL AMB  POCT GLUCOSE, UA: ABNORMAL
SL AMB POCT URINE PROTEIN: ABNORMAL

## 2020-07-20 PROCEDURE — PNV: Performed by: STUDENT IN AN ORGANIZED HEALTH CARE EDUCATION/TRAINING PROGRAM

## 2020-07-20 NOTE — PROGRESS NOTES
Routine OB 34w1d  Overall feels well  Denies any loss of fluid, bleeding or contractions  She confirms good fetal movement  Urine Dip negative  for glucose and trace for protein  Reviewed last weeks of pregnancy and perineum massage papers

## 2020-07-20 NOTE — PROGRESS NOTES
27 y o  Alexx Serrano at 34w1d presents for routine prenatal visit  She denies contractions/leakage of fluid/vaginal bleeding  She feels good fetal movement  Problem List Items Addressed This Visit     Other    Rh negative state in antepartum period  S/p rhogam    Maternal obesity, antepartum, third trimester   Other Visit Diagnoses     Encounter for supervision of normal first pregnancy in third trimester    -  Primary  F/u in 2 wks - GBS at next visit  Discussed contraception - pt's  to get vasectomy  Info on urology given today

## 2020-07-29 ENCOUNTER — TELEPHONE (OUTPATIENT)
Dept: OBGYN CLINIC | Facility: CLINIC | Age: 30
End: 2020-07-29

## 2020-07-29 NOTE — TELEPHONE ENCOUNTER
I would watch it at home  Any increase or change please call back  Was she active the past couple days? Patient aware of Dr Lane Quijano recommendations  Patient takes short walks but aware if any increase in vaginal bleeding or decrease in movement to call right away  She is aware how to do fetal kick counts  Keep appt on 8/4

## 2020-07-29 NOTE — TELEPHONE ENCOUNTER
Patient is 35 weeks 3 days  She had some light spotting today  She had intercourse a few days ago not recently  She did her fetal kick counts and normal    Denies LOF and contractions  Slight cramps but nothing painful  She just wants reassurance that there is nothing wrong  TT to Dr Adarsh Rees

## 2020-08-04 ENCOUNTER — ROUTINE PRENATAL (OUTPATIENT)
Dept: OBGYN CLINIC | Facility: CLINIC | Age: 30
End: 2020-08-04

## 2020-08-04 VITALS
TEMPERATURE: 98.6 F | HEIGHT: 64 IN | SYSTOLIC BLOOD PRESSURE: 134 MMHG | BODY MASS INDEX: 38.58 KG/M2 | WEIGHT: 226 LBS | DIASTOLIC BLOOD PRESSURE: 82 MMHG

## 2020-08-04 DIAGNOSIS — O99.213 MATERNAL OBESITY, ANTEPARTUM, THIRD TRIMESTER: ICD-10-CM

## 2020-08-04 DIAGNOSIS — O34.41 H/O LEEP (LOOP ELECTROSURGICAL EXCISION PROCEDURE) OF CERVIX COMPLICATING PREGNANCY, FIRST TRIMESTER: ICD-10-CM

## 2020-08-04 DIAGNOSIS — Z98.890 H/O LEEP (LOOP ELECTROSURGICAL EXCISION PROCEDURE) OF CERVIX COMPLICATING PREGNANCY, FIRST TRIMESTER: ICD-10-CM

## 2020-08-04 DIAGNOSIS — Z34.03 ENCOUNTER FOR SUPERVISION OF NORMAL FIRST PREGNANCY IN THIRD TRIMESTER: Primary | ICD-10-CM

## 2020-08-04 LAB
SL AMB  POCT GLUCOSE, UA: ABNORMAL
SL AMB POCT URINE PROTEIN: 1

## 2020-08-04 PROCEDURE — PNV: Performed by: STUDENT IN AN ORGANIZED HEALTH CARE EDUCATION/TRAINING PROGRAM

## 2020-08-04 PROCEDURE — 87653 STREP B DNA AMP PROBE: CPT | Performed by: STUDENT IN AN ORGANIZED HEALTH CARE EDUCATION/TRAINING PROGRAM

## 2020-08-04 NOTE — PROGRESS NOTES
Routine OB 36wk2d  Overall feels well  Denies any loss of fluid, bleeding or contractions  She confirms good fetal movement  Urine Dip NEG for glucose and +1 for protein  GBS to be collected today  Last Weeks of Preg and Perineal Massage reviewed  She already breast pump at home

## 2020-08-04 NOTE — ASSESSMENT & PLAN NOTE
28 yo  at 36+2 here for routine ob visit  Feeling some lower cramps and vaginal pain  No leaking or bleeding  Good fetal movement  Reviewed labor precautions  GBS collected  Cervix closed  Cephalic by leopolds  Return in one week

## 2020-08-04 NOTE — PROGRESS NOTES
H/O LEEP (loop electrosurgical excision procedure) of cervix complicating pregnancy, first trimester  Possible cicatrix noted on exam today cervix closed    Maternal obesity, antepartum, third trimester  TWG 46# reviewed continued walking and minimize additional weight gain prior to delivery    Encounter for supervision of normal first pregnancy in third trimester  28 yo  at 36+2 here for routine ob visit  Feeling some lower cramps and vaginal pain  No leaking or bleeding  Good fetal movement  Reviewed labor precautions  GBS collected  Cervix closed  Cephalic by leopolds  Return in one week

## 2020-08-06 LAB — GP B STREP DNA SPEC QL NAA+PROBE: NORMAL

## 2020-08-12 ENCOUNTER — ROUTINE PRENATAL (OUTPATIENT)
Dept: OBGYN CLINIC | Facility: CLINIC | Age: 30
End: 2020-08-12

## 2020-08-12 VITALS — DIASTOLIC BLOOD PRESSURE: 80 MMHG | WEIGHT: 229 LBS | BODY MASS INDEX: 39.31 KG/M2 | SYSTOLIC BLOOD PRESSURE: 130 MMHG

## 2020-08-12 DIAGNOSIS — O26.899 RH NEGATIVE STATE IN ANTEPARTUM PERIOD: ICD-10-CM

## 2020-08-12 DIAGNOSIS — Z34.03 ENCOUNTER FOR SUPERVISION OF NORMAL FIRST PREGNANCY IN THIRD TRIMESTER: Primary | ICD-10-CM

## 2020-08-12 DIAGNOSIS — O99.213 MATERNAL OBESITY, ANTEPARTUM, THIRD TRIMESTER: ICD-10-CM

## 2020-08-12 DIAGNOSIS — Z67.91 RH NEGATIVE STATE IN ANTEPARTUM PERIOD: ICD-10-CM

## 2020-08-12 PROBLEM — O26.892 PELVIC PAIN AFFECTING PREGNANCY IN SECOND TRIMESTER, ANTEPARTUM: Status: RESOLVED | Noted: 2020-04-28 | Resolved: 2020-08-12

## 2020-08-12 PROBLEM — Z3A.32 32 WEEKS GESTATION OF PREGNANCY: Status: RESOLVED | Noted: 2020-04-28 | Resolved: 2020-08-12

## 2020-08-12 PROBLEM — R10.2 PELVIC PAIN AFFECTING PREGNANCY IN SECOND TRIMESTER, ANTEPARTUM: Status: RESOLVED | Noted: 2020-04-28 | Resolved: 2020-08-12

## 2020-08-12 LAB
SL AMB  POCT GLUCOSE, UA: NORMAL
SL AMB POCT URINE PROTEIN: NORMAL

## 2020-08-12 PROCEDURE — PNV: Performed by: NURSE PRACTITIONER

## 2020-08-12 NOTE — PATIENT INSTRUCTIONS
Pregnancy at 44 to 40 Weeks   AMBULATORY CARE:   What changes are happening to your body: You are now getting close to your due date  Your due date is just an estimate of when your baby will be born  Your baby may be born before or after your due date  Your breathing may be easier if your baby has moved down into a head-down position  You may need to urinate more often because the baby may be pressing on your bladder  You may also feel more discomfort and tire easily  You may also be having trouble sleeping  Seek care immediately if:   · You develop a severe headache that does not go away  · You have new or increased vision changes, such as blurred or spotted vision  · You have new or increased swelling in your face or hands  · You have vaginal spotting or bleeding  · Your water broke or you feel warm water gushing or trickling from your vagina  Contact your healthcare provider if:   · You have more than 5 contractions in 1 hour  · You notice any changes in your baby's movements  · You have abdominal cramps, pressure, or tightening  · You have a change in vaginal discharge  · You have chills or a fever  · You have vaginal itching, burning, or pain  · You have yellow, green, white, or foul-smelling vaginal discharge  · You have pain or burning when you urinate, less urine than usual, or pink or bloody urine  · You have questions or concerns about your condition or care  How to care for yourself at this stage of your pregnancy:   · Eat a variety of healthy foods  Healthy foods include fruits, vegetables, whole-grain breads, low-fat dairy foods, beans, lean meats, and fish  Drink liquids as directed  Ask how much liquid to drink each day and which liquids are best for you  Limit caffeine to less than 200 milligrams each day  Limit your intake of fish to 2 servings each week  Choose fish low in mercury such as canned light tuna, shrimp, crab, salmon, cod, or tilapia   Do not eat fish high in mercury such as swordfish, tilefish, janet mackerel, and shark  · Take prenatal vitamins as directed  Your need for certain vitamins and minerals, such as folic acid, increases during pregnancy  Prenatal vitamins provide some of the extra vitamins and minerals you need  Prenatal vitamins may also help to decrease the risk of certain birth defects  · Rest as needed  Put your feet up if you have swelling in your ankles and feet  · Do not smoke  If you smoke, it is never too late to quit  Smoking increases your risk of a miscarriage and other health problems during your pregnancy  Smoking can cause your baby to be born too early or weigh less at birth  Ask your healthcare provider for information if you need help quitting  · Do not drink alcohol  Alcohol passes from your body to your baby through the placenta  It can affect your baby's brain development and cause fetal alcohol syndrome (FAS)  FAS is a group of conditions that causes mental, behavior, and growth problems  · Talk to your healthcare provider before you take any medicines  Many medicines may harm your baby if you take them when you are pregnant  Do not take any medicines, vitamins, herbs, or supplements without first talking to your healthcare provider  Never use illegal or street drugs (such as marijuana or cocaine) while you are pregnant  · Talk to your healthcare provider before you travel  You may not be able to travel in an airplane after 36 weeks  He may also recommend that you avoid long road trips  Safety tips during pregnancy:   · Avoid hot tubs and saunas  Do not use a hot tub or sauna while you are pregnant, especially during your first trimester  Hot tubs and saunas may raise your baby's temperature and increase the risk of birth defects  · Avoid toxoplasmosis  This is an infection caused by eating raw meat or being around infected cat feces   It can cause birth defects, miscarriages, and other problems  Wash your hands after you touch raw meat  Make sure any meat is well-cooked before you eat it  Avoid raw eggs and unpasteurized milk  Use gloves or ask someone else to clean your cat's litter box while you are pregnant  · Ask your healthcare provider about travel  The most comfortable time to travel is during the second trimester  Ask your healthcare provider if you can travel after 36 weeks  You may not be able to travel in an airplane after 36 weeks  He may also recommend that you avoid long road trips  Changes that are happening with your baby: Your baby is ready to be born  At birth, your baby may weigh about 6 to 9 pounds and be about 19 to 21 inches long  Your baby may be in a head-down position  Your baby will also rest lower in your abdomen  What you need to know about prenatal care: Your healthcare provider will check your blood pressure and weight  You may also need the following:  · A urine test  may also be done to check for sugar and protein  These can be signs of gestational diabetes or infection  Protein in your urine may also be a sign of preeclampsia  Preeclampsia is a condition that can develop during week 20 or later of your pregnancy  It causes high blood pressure, and it can cause problems with your kidneys and other organs  · Your baby's heart rate  will be checked  © 2017 [address] [employer] Information is for End User's use only and may not be sold, redistributed or otherwise used for commercial purposes  All illustrations and images included in CareNotes® are the copyrighted property of [employer]  or [employer]  The above information is an  only  It is not intended as medical advice for individual conditions or treatments  Talk to your doctor, nurse or pharmacist before following any medical regimen to see if it is safe and effective for you

## 2020-08-12 NOTE — PROGRESS NOTES
Problem List Items Addressed This Visit        Other    Rh negative state in antepartum period    Maternal obesity, antepartum, third trimester    Encounter for supervision of normal first pregnancy in third trimester - Primary    Relevant Orders    POCT urine dip (Completed)        Feels well  Denies LOF/CTX/VB  No concerns  Discussed fetal kick counting  Encouraged to start with her vaginal massages  Advised to increase fluids on the warmer days    Advised Social Distancing and frequent handwashing, limited social/work interactions etc

## 2020-08-16 ENCOUNTER — HOSPITAL ENCOUNTER (OUTPATIENT)
Facility: HOSPITAL | Age: 30
Discharge: HOME | End: 2020-08-17
Attending: STUDENT IN AN ORGANIZED HEALTH CARE EDUCATION/TRAINING PROGRAM | Admitting: STUDENT IN AN ORGANIZED HEALTH CARE EDUCATION/TRAINING PROGRAM
Payer: COMMERCIAL

## 2020-08-16 ENCOUNTER — TELEPHONE (OUTPATIENT)
Dept: OTHER | Facility: OTHER | Age: 30
End: 2020-08-16

## 2020-08-16 PROBLEM — Z3A.38 38 WEEKS GESTATION OF PREGNANCY: Status: ACTIVE | Noted: 2020-08-16

## 2020-08-16 LAB
ALBUMIN SERPL BCP-MCNC: 2.5 G/DL (ref 3.5–5)
ALP SERPL-CCNC: 105 U/L (ref 46–116)
ALT SERPL W P-5'-P-CCNC: 47 U/L (ref 12–78)
ANION GAP SERPL CALCULATED.3IONS-SCNC: 12 MMOL/L (ref 4–13)
AST SERPL W P-5'-P-CCNC: 22 U/L (ref 5–45)
BILIRUB SERPL-MCNC: 0.61 MG/DL (ref 0.2–1)
BILIRUB UR QL STRIP: NEGATIVE
BUN SERPL-MCNC: 9 MG/DL (ref 5–25)
CALCIUM SERPL-MCNC: 8.5 MG/DL (ref 8.3–10.1)
CHLORIDE SERPL-SCNC: 106 MMOL/L (ref 100–108)
CLARITY UR: ABNORMAL
CO2 SERPL-SCNC: 21 MMOL/L (ref 21–32)
COLOR UR: YELLOW
CREAT SERPL-MCNC: 0.71 MG/DL (ref 0.6–1.3)
CREAT UR-MCNC: 30 MG/DL
ERYTHROCYTE [DISTWIDTH] IN BLOOD BY AUTOMATED COUNT: 12.9 % (ref 11.6–15.1)
GFR SERPL CREATININE-BSD FRML MDRD: 115 ML/MIN/1.73SQ M
GLUCOSE SERPL-MCNC: 98 MG/DL (ref 65–140)
GLUCOSE UR STRIP-MCNC: NEGATIVE MG/DL
HCT VFR BLD AUTO: 31.5 % (ref 34.8–46.1)
HGB BLD-MCNC: 10.8 G/DL (ref 11.5–15.4)
HGB UR QL STRIP.AUTO: NEGATIVE
KETONES UR STRIP-MCNC: NEGATIVE MG/DL
LEUKOCYTE ESTERASE UR QL STRIP: ABNORMAL
MCH RBC QN AUTO: 31.8 PG (ref 26.8–34.3)
MCHC RBC AUTO-ENTMCNC: 34.3 G/DL (ref 31.4–37.4)
MCV RBC AUTO: 93 FL (ref 82–98)
NITRITE UR QL STRIP: NEGATIVE
PH UR STRIP.AUTO: 7 [PH]
PLATELET # BLD AUTO: 308 THOUSANDS/UL (ref 149–390)
PMV BLD AUTO: 10.2 FL (ref 8.9–12.7)
POTASSIUM SERPL-SCNC: 3.6 MMOL/L (ref 3.5–5.3)
PROT SERPL-MCNC: 6.2 G/DL (ref 6.4–8.2)
PROT UR STRIP-MCNC: NEGATIVE MG/DL
PROT UR-MCNC: 6 MG/DL
PROT/CREAT UR: 0.2 MG/G{CREAT} (ref 0–0.1)
RBC # BLD AUTO: 3.4 MILLION/UL (ref 3.81–5.12)
SODIUM SERPL-SCNC: 139 MMOL/L (ref 136–145)
SP GR UR STRIP.AUTO: 1.01 (ref 1–1.03)
UROBILINOGEN UR QL STRIP.AUTO: 0.2 E.U./DL
WBC # BLD AUTO: 11.01 THOUSAND/UL (ref 4.31–10.16)

## 2020-08-16 PROCEDURE — 80053 COMPREHEN METABOLIC PANEL: CPT | Performed by: STUDENT IN AN ORGANIZED HEALTH CARE EDUCATION/TRAINING PROGRAM

## 2020-08-16 PROCEDURE — 82570 ASSAY OF URINE CREATININE: CPT | Performed by: STUDENT IN AN ORGANIZED HEALTH CARE EDUCATION/TRAINING PROGRAM

## 2020-08-16 PROCEDURE — 84156 ASSAY OF PROTEIN URINE: CPT | Performed by: STUDENT IN AN ORGANIZED HEALTH CARE EDUCATION/TRAINING PROGRAM

## 2020-08-16 PROCEDURE — 81001 URINALYSIS AUTO W/SCOPE: CPT | Performed by: STUDENT IN AN ORGANIZED HEALTH CARE EDUCATION/TRAINING PROGRAM

## 2020-08-16 PROCEDURE — 99213 OFFICE O/P EST LOW 20 MIN: CPT | Performed by: STUDENT IN AN ORGANIZED HEALTH CARE EDUCATION/TRAINING PROGRAM

## 2020-08-16 PROCEDURE — 85027 COMPLETE CBC AUTOMATED: CPT | Performed by: STUDENT IN AN ORGANIZED HEALTH CARE EDUCATION/TRAINING PROGRAM

## 2020-08-17 ENCOUNTER — TELEPHONE (OUTPATIENT)
Dept: OBGYN CLINIC | Facility: CLINIC | Age: 30
End: 2020-08-17

## 2020-08-17 VITALS
DIASTOLIC BLOOD PRESSURE: 76 MMHG | SYSTOLIC BLOOD PRESSURE: 121 MMHG | TEMPERATURE: 97.9 F | RESPIRATION RATE: 16 BRPM | HEIGHT: 64 IN | BODY MASS INDEX: 39.09 KG/M2 | WEIGHT: 229 LBS | OXYGEN SATURATION: 99 % | HEART RATE: 67 BPM

## 2020-08-17 LAB
BACTERIA UR QL AUTO: ABNORMAL /HPF
NON-SQ EPI CELLS URNS QL MICRO: ABNORMAL /HPF
RBC #/AREA URNS AUTO: ABNORMAL /HPF
WBC #/AREA URNS AUTO: ABNORMAL /HPF

## 2020-08-17 PROCEDURE — G0463 HOSPITAL OUTPT CLINIC VISIT: HCPCS

## 2020-08-17 PROCEDURE — 76815 OB US LIMITED FETUS(S): CPT | Performed by: STUDENT IN AN ORGANIZED HEALTH CARE EDUCATION/TRAINING PROGRAM

## 2020-08-17 PROCEDURE — 99214 OFFICE O/P EST MOD 30 MIN: CPT

## 2020-08-17 NOTE — TELEPHONE ENCOUNTER
----- Message from Jayla Lira MD sent at 8/16/2020 11:50 PM EDT -----  Patient seen in triage overnight with one elevated BP but no diagnosis of gHTN confirmed  She was discharged home  Next appt is 8/20   If patient is able to seen sooner for BP check or PN appt that would be ideal

## 2020-08-17 NOTE — PROGRESS NOTES
L&D Triage Note - OB/GYN  Cabrera Woo 27 y o  female MRN: [id number]  Unit/Bed#: LD TRIAGE 4-01 Encounter: [id number]    Patient is seen by     ASSESSMENT/PLAN  Cabrera Woo is a 27 y o  [place] at 38w0d who presented to L&D for a high blood pressure of 140/85 at home and decreased fetal movement  Her blood pressures have been 120s-130s/60s-70s while here, no other signs and symptoms of PreE  Reactive strip  Dispo home  SVE: deferred       FHT:  Baseline Rate: 135 bpm  Variability: Moderate 6-25 bpm  Accelerations: 15 x 15 or greater  Decelerations: None  FHR Category: Category I    TOCO:   Contraction Frequency (minutes): None/Irritability    IMAGING:    SARAH Q1 (cm): 3 9 cm  SARAH Q2 (cm): 1 9 cm  SARAH Q3 (cm): 0 cm  SARAH Q4 (cm): 4 cm  SARAH TOTAL (cm): 9 8 cm  LVP (cm): 4 cm  Ultrasound Other  Fetal Presentation: Vertex  Anterior placenta    #2  Discharge instructions  · Patient instructed to call if experiencing worsening contractions, vaginal bleeding, loss of fluid or decreased fetal movement  · Will follow up with OBGYN in office    D/w Dr Nagel Pair  ______________    SUBJECTIVE    HAYLEE: Estimated Date of Delivery: 8/30/20    HPI:  27 y o  [place] 38w0d who presented to L&D for a high blood pressure of 140/85 at home and decreased fetal movement  She occasionally has headaches on/off, not correlated with Bps but she states she does have heart flutters when she has high Bps  No shortness of breath, RUQ abdominal pain, excessive swelling in legs and hands       Contractions: no  Leakage of fluid: no  Vaginal Bleeding: no  Fetal movement: present    Her current obstetrical history is significant for history of a LEEP, maternal obesity    Her past obstetrical history is significant for SABx1      ROS:  Constitutional: Negative  Respiratory: Negative  Cardiovascular: Negative    Gastrointestinal: Negative    Physical Exam  GEN: AAOx3, in no apparent distress   ABD: gravid uterus  SVE:  deferred    OBJECTIVE:  /76 Pulse 67   Temp 97 9 °F (36 6 °C)   Resp 16   Ht 5' 4" (1 626 m)   Wt 104 kg (229 lb)   LMP 11/24/2019 (Exact Date)   SpO2 99%   BMI 39 31 kg/m²   Body mass index is 39 31 kg/m²    Labs:   Recent Results (from the past 24 hour(s))   CBC and Platelet    Collection Time: 08/16/20 11:17 PM   Result Value Ref Range    WBC 11 01 (H) 4 31 - 10 16 Thousand/uL    RBC 3 40 (L) 3 81 - 5 12 Million/uL    Hemoglobin 10 8 (L) 11 5 - 15 4 g/dL    Hematocrit 31 5 (L) 34 8 - 46 1 %    MCV 93 82 - 98 fL    MCH 31 8 26 8 - 34 3 pg    MCHC 34 3 31 4 - 37 4 g/dL    RDW 12 9 11 6 - 15 1 %    Platelets 105 935 - 562 Thousands/uL    MPV 10 2 8 9 - 12 7 fL   Comprehensive metabolic panel    Collection Time: 08/16/20 11:17 PM   Result Value Ref Range    Sodium 139 136 - 145 mmol/L    Potassium 3 6 3 5 - 5 3 mmol/L    Chloride 106 100 - 108 mmol/L    CO2 21 21 - 32 mmol/L    ANION GAP 12 4 - 13 mmol/L    BUN 9 5 - 25 mg/dL    Creatinine 0 71 0 60 - 1 30 mg/dL    Glucose 98 65 - 140 mg/dL    Calcium 8 5 8 3 - 10 1 mg/dL    AST 22 5 - 45 U/L    ALT 47 12 - 78 U/L    Alkaline Phosphatase 105 46 - 116 U/L    Total Protein 6 2 (L) 6 4 - 8 2 g/dL    Albumin 2 5 (L) 3 5 - 5 0 g/dL    Total Bilirubin 0 61 0 20 - 1 00 mg/dL    eGFR 115 ml/min/1 73sq m   Protein / creatinine ratio, urine    Collection Time: 08/16/20 11:17 PM   Result Value Ref Range    Creatinine, Ur 30 0 mg/dL    Protein Urine Random 6 mg/dL    Prot/Creat Ratio, Ur 0 20 (H) 0 00 - 0 10   UA (URINE) with reflex to Scope    Collection Time: 08/16/20 11:18 PM   Result Value Ref Range    Color, UA Yellow     Clarity, UA Slightly Cloudy     Specific [place], UA 1 015 1 003 - 1 030    pH, UA 7 0 4 5, 5 0, 5 5, 6 0, 6 5, 7 0, 7 5, 8 0    Leukocytes, UA Small (A) Negative    Nitrite, UA Negative Negative    Protein, UA Negative Negative mg/dl    Glucose, UA Negative Negative mg/dl    Ketones, UA Negative Negative mg/dl    Urobilinogen, UA 0 2 0 2, 1 0 E U /dl E U /dl    Bilirubin, UA Negative Negative    Blood, UA Negative Negative   Urine Microscopic    Collection Time: 08/16/20 11:18 PM   Result Value Ref Range    RBC, UA 0-1 (A) None Seen, 0-5 /hpf    WBC, UA 10-20 (A) None Seen, 0-5, 5-55, 5-65 /hpf    Epithelial Cells Occasional None Seen, Occasional /hpf    Bacteria, UA Occasional None Seen, Occasional /hpf         Antoinette Veloz DO  OB/GYN PGY-1  8/17/2020  3:58 AM

## 2020-08-17 NOTE — TELEPHONE ENCOUNTER
Pt called in she is stating she is 38 weeks pregnant ant since last night her blood pressure has been at 140/85 and she is concerned and will like to speak with provider

## 2020-08-17 NOTE — DISCHARGE INSTRUCTIONS
Pregnancy at [address] to [address]:   You are considered full term at the beginning of 37 weeks  Your breathing may be easier if your baby has moved down into a head-down position  You may need to urinate more often because the baby may be pressing on your bladder  You may also feel more discomfort and get tired easily  DISCHARGE INSTRUCTIONS:   Seek care immediately if:   · You develop a severe headache that does not go away  · You have new or increased vision changes, such as blurred or spotted vision  · You have new or increased swelling in your face or hands  · You have vaginal spotting or bleeding  · Your water broke or you feel warm water gushing or trickling from your vagina  Contact your healthcare provider if:   · You have more than 5 contractions in 1 hour  · You notice any changes in your baby's movements  · You have abdominal cramps, pressure, or tightening  · You have a change in vaginal discharge  · You have chills or a fever  · You have vaginal itching, burning, or pain  · You have yellow, green, white, or foul-smelling vaginal discharge  · You have pain or burning when you urinate, less urine than usual, or pink or bloody urine  · You have questions or concerns about your condition or care  How to care for yourself at this stage of your pregnancy:   · Eat a variety of healthy foods  Healthy foods include fruits, vegetables, whole-grain breads, low-fat dairy foods, beans, lean meats, and fish  Drink liquids as directed  Ask how much liquid to drink each day and which liquids are best for you  Limit caffeine to less than 200 milligrams each day  Limit your intake of fish to 2 servings each week  Choose fish low in mercury such as canned light tuna, shrimp, salmon, cod, or tilapia  Do not  eat fish high in mercury such as swordfish, tilefish, janet mackerel, and shark  · Take prenatal vitamins as directed    Your need for certain vitamins and minerals, such as folic acid, increases during pregnancy  Prenatal vitamins provide some of the extra vitamins and minerals you need  Prenatal vitamins may also help to decrease the risk of certain birth defects  · Rest as needed  Put your feet up if you have swelling in your ankles and feet  · Do not smoke  If you smoke, it is never too late to quit  Smoking increases your risk of a miscarriage and other health problems during your pregnancy  Smoking can cause your baby to be born too early or weigh less at birth  Ask your healthcare provider for information if you need help quitting  · Do not drink alcohol  Alcohol passes from your body to your baby through the placenta  It can affect your baby's brain development and cause fetal alcohol syndrome (FAS)  FAS is a group of conditions that causes mental, behavior, and growth problems  · Talk to your healthcare provider before you take any medicines  Many medicines may harm your baby if you take them when you are pregnant  Do not take any medicines, vitamins, herbs, or supplements without first talking to your healthcare provider  Never use illegal or street drugs (such as marijuana or cocaine) while you are pregnant  · Talk to your healthcare provider before you travel  You may not be able to travel in an airplane after 36 weeks  He may also recommend that you avoid long road trips  Safety tips:   · Avoid hot tubs and saunas  Do not use a hot tub or sauna while you are pregnant, especially during your first trimester  Hot tubs and saunas may raise your baby's temperature and increase the risk of birth defects  · Avoid toxoplasmosis  This is an infection caused by eating raw meat or being around infected cat feces  It can cause birth defects, miscarriages, and other problems  Wash your hands after you touch raw meat  Make sure any meat is well-cooked before you eat it  Avoid raw eggs and unpasteurized milk   Use gloves or ask someone else to clean your cat's litter box while you are pregnant  · Ask your healthcare provider about travel  The most comfortable time to travel is during the second trimester  Ask your healthcare provider if you can travel after 36 weeks  You may not be able to travel in an airplane after 36 weeks  He may also recommend that you avoid long road trips  Changes that are happening with your baby:  By 38 weeks, your baby may weigh between 6 and 9 pounds  Your baby may be about 14 inches long from the top of the head to the rump (baby's bottom)  Your baby hears well enough to know your voice  As your baby gets larger, you may feel fewer kicks and more stretching and rolling  Your baby may move into a head-down position  Your baby will also rest lower in your abdomen  What you need to know about prenatal care: Your healthcare provider will check your blood pressure and weight  You may also need the following:  · A urine test  may also be done to check for sugar and protein  These can be signs of gestational diabetes or infection  Protein in your urine may also be a sign of preeclampsia  Preeclampsia is a condition that can develop during week 20 or later of your pregnancy  It causes high blood pressure, and it can cause problems with your kidneys and other organs  · A blood test  may be done to check for anemia (low iron level)  · A Tdap vaccine  may be recommended by your healthcare provider  · A group B strep test  is a test that is done to check for group B strep infection  Group B strep is a type of bacteria that may be found in the vagina or rectum  It can be passed to your baby during delivery if you have it  Your healthcare provider will take swab your vagina or rectum and send the sample to the lab for tests  · Fundal height  is a measurement of your uterus to check your baby's growth  This number is usually the same as the number of weeks that you have been pregnant   Your healthcare provider may also check your baby's position  · Your baby's heart rate  will be checked  © 2017 [address] [employer] Information is for End User's use only and may not be sold, redistributed or otherwise used for commercial purposes  All illustrations and images included in CareNotes® are the copyrighted property of [employer]  or [employer]  The above information is an  only  It is not intended as medical advice for individual conditions or treatments  Talk to your doctor, nurse or pharmacist before following any medical regimen to see if it is safe and effective for you  Preeclampsia   AMBULATORY CARE:   Preeclampsia and eclampsia  are conditions that can develop during week 20 or later of your pregnancy  Preeclampsia means you have high blood pressure and protein in your urine  Preeclampsia progresses to eclampsia if you have a seizure  These conditions can create mild to life-threatening health problems for you and your unborn baby  Common signs and symptoms include the following:   · Swollen face and hands    · Weight gain of 2 or more pounds each week     · Headache    · Spotted or blurred vision     · Pain in the upper abdomen  Call 911 for any of the following:   · Seizure    · Severe abdominal pain with nausea and vomiting  Seek care immediately for the following symptoms:   · You develop a severe headache that does not go away  · You have blurred or spotted vision that does not go away  · You are bleeding from your vagina  · You have new or increased swelling in your face or hands  · You are urinating little or not at all  Contact your healthcare provider if:   · You are urinating less than usual      · You do not feel your baby's movements as often as usual     · You have questions or concerns about your condition or care  Seizure first aid:  You may have seizures with eclampsia  You may feel confused, tense, or aggressive when the seizure ends   Tell your family, friends, or coworkers to do the following if you have a seizure:  · Clear the area to help prevent injuries from falls    · Place you on your left side so you do not choke    · Give oxygen if it is available    · Call 911     · Stay with you until medical help arrives  Treatment for preeclampsia and eclampsia  may include any of the following:  · Medicines  may be given to lower your blood pressure, protect your organs, or prevent seizures  Low doses of aspirin after 12 weeks of pregnancy may be recommended if you are at high risk for preeclampsia  Aspirin may help prevent preeclampsia or problems that can happen from preeclampsia  Do not take aspirin unless directed by your healthcare provider  · Rest  as directed  Your healthcare provider may tell you to rest more often if you have mild symptoms of preeclampsia  Lie on your left side as often as you can  You may need complete bedrest if you have more severe symptoms  You may need to be in the hospital if your condition worsens  · Delivery  usually stops preeclampsia and eclampsia  Healthcare providers may deliver your baby right away if he is full-term (37 weeks or more)  You may need to deliver your baby early if you or the baby has life-threatening symptoms  Blood pressure checks: You may need to check your blood pressure each day  Your obstetrician will teach you how to check your blood pressure at home  Measure your blood pressure on the same arm and in the same position each time  Write down the date and time you take your blood pressure, and bring your notes to your prenatal visits  Kick counts: You may need to keep track of how often your baby moves or kicks over a certain amount of time  Ask your obstetrician how to do kick counts and how often to do them  Daily weight:  Weigh yourself every day before breakfast  Weight gain can be a sign of extra fluid in your body  Call your obstetrician if you have gained 2 or more pounds in a week  Rest:  Your obstetrician may tell you to rest more often if you have mild symptoms of preeclampsia  You may need total bedrest for more severe symptoms  Try to lie on your left side  Follow up with your obstetrician as directed: You will need tests 1 to 2 times a week to check your condition  Tests include blood pressure checks, urine and blood tests, and fetal monitoring  Write down your questions so you remember to ask them during your visits  © 2017 [facility] Information is for End User's use only and may not be sold, redistributed or otherwise used for commercial purposes  All illustrations and images included in CareNotes® are the copyrighted property of [employer]  or [employer]  The above information is an  only  It is not intended as medical advice for individual conditions or treatments  Talk to your doctor, nurse or pharmacist before following any medical regimen to see if it is safe and effective for you

## 2020-08-17 NOTE — TELEPHONE ENCOUNTER
----- Message from Shari Serrano MD sent at 8/16/2020 11:50 PM EDT -----  Patient seen in triage overnight with one elevated BP but no diagnosis of gHTN confirmed  She was discharged home  Next appt is 8/20   If patient is able to seen sooner for BP check or PN appt that would be ideal

## 2020-08-17 NOTE — PROCEDURES
Shazia Chavarria, a [id number] at 38w1d with an HAYLEE of 8/30/2020, by Last Menstrual Period, was seen at [address] for the following procedure(s): $Procedure Type: SARAH]         4 Quadrant SARAH  SARAH Q1 (cm): 3 9 cm  SARAH Q2 (cm): 1 9 cm  SARAH Q3 (cm): 0 cm  SARAH Q4 (cm): 4 cm  SARAH TOTAL (cm): 9 8 cm  LVP (cm): 4 cm  Ultrasound Other  Fetal Presentation: Vertex  Anterior placenta  Gross fetal movement seen by myself and patient during exam  FHT with baseline of 135, moderate variability with numerous 15x15 accelerations, no decelerations  Bps in triage 120-130/60-70s- PC ratio 0 2, other labs wnl as below  Patient feeling better fetal movement    Recent Results (from the past 6 hour(s))   CBC and Platelet    Collection Time: 08/16/20 11:17 PM   Result Value Ref Range    WBC 11 01 (H) 4 31 - 10 16 Thousand/uL    RBC 3 40 (L) 3 81 - 5 12 Million/uL    Hemoglobin 10 8 (L) 11 5 - 15 4 g/dL    Hematocrit 31 5 (L) 34 8 - 46 1 %    MCV 93 82 - 98 fL    MCH 31 8 26 8 - 34 3 pg    MCHC 34 3 31 4 - 37 4 g/dL    RDW 12 9 11 6 - 15 1 %    Platelets 641 964 - 727 Thousands/uL    MPV 10 2 8 9 - 12 7 fL   Comprehensive metabolic panel    Collection Time: 08/16/20 11:17 PM   Result Value Ref Range    Sodium 139 136 - 145 mmol/L    Potassium 3 6 3 5 - 5 3 mmol/L    Chloride 106 100 - 108 mmol/L    CO2 21 21 - 32 mmol/L    ANION GAP 12 4 - 13 mmol/L    BUN 9 5 - 25 mg/dL    Creatinine 0 71 0 60 - 1 30 mg/dL    Glucose 98 65 - 140 mg/dL    Calcium 8 5 8 3 - 10 1 mg/dL    AST 22 5 - 45 U/L    ALT 47 12 - 78 U/L    Alkaline Phosphatase 105 46 - 116 U/L    Total Protein 6 2 (L) 6 4 - 8 2 g/dL    Albumin 2 5 (L) 3 5 - 5 0 g/dL    Total Bilirubin 0 61 0 20 - 1 00 mg/dL    eGFR 115 ml/min/1 73sq m   Protein / creatinine ratio, urine    Collection Time: 08/16/20 11:17 PM   Result Value Ref Range    Creatinine, Ur 30 0 mg/dL    Protein Urine Random 6 mg/dL    Prot/Creat Ratio, Ur 0 20 (H) 0 00 - 0 10   UA (URINE) with reflex to Scope    Collection Time: 08/16/20 11:18 PM   Result Value Ref Range    Color, UA Yellow     Clarity, UA Slightly Cloudy     Specific [place], UA 1 015 1 003 - 1 030    pH, UA 7 0 4 5, 5 0, 5 5, 6 0, 6 5, 7 0, 7 5, 8 0    Leukocytes, UA Small (A) Negative    Nitrite, UA Negative Negative    Protein, UA Negative Negative mg/dl    Glucose, UA Negative Negative mg/dl    Ketones, UA Negative Negative mg/dl    Urobilinogen, UA 0 2 0 2, 1 0 E U /dl E U /dl    Bilirubin, UA Negative Negative    Blood, UA Negative Negative   Urine Microscopic    Collection Time: 08/16/20 11:18 PM   Result Value Ref Range    RBC, UA 0-1 (A) None Seen, 0-5 /hpf    WBC, UA 10-20 (A) None Seen, 0-5, 5-55, 5-65 /hpf    Epithelial Cells Occasional None Seen, Occasional /hpf    Bacteria, UA Occasional None Seen, Occasional /hpf       Discussed preeclamptic and labor precautions- patient to follow up in offe Monday or Tuesday for BP check      D/w Dr Toya Moreira MD  08/17/20

## 2020-08-18 ENCOUNTER — ROUTINE PRENATAL (OUTPATIENT)
Dept: OBGYN CLINIC | Facility: MEDICAL CENTER | Age: 30
End: 2020-08-18

## 2020-08-18 VITALS — WEIGHT: 230.2 LBS | BODY MASS INDEX: 39.51 KG/M2 | TEMPERATURE: 97.5 F

## 2020-08-18 DIAGNOSIS — R03.0 ELEVATED BLOOD PRESSURE READING WITHOUT DIAGNOSIS OF HYPERTENSION: ICD-10-CM

## 2020-08-18 DIAGNOSIS — Z34.03 ENCOUNTER FOR SUPERVISION OF NORMAL FIRST PREGNANCY IN THIRD TRIMESTER: Primary | ICD-10-CM

## 2020-08-18 LAB
SL AMB  POCT GLUCOSE, UA: NORMAL
SL AMB POCT URINE PROTEIN: NORMAL

## 2020-08-18 PROCEDURE — PNV: Performed by: NURSE PRACTITIONER

## 2020-08-18 NOTE — ASSESSMENT & PLAN NOTE
8/16 had elevated BP @ home  140/85  Sent to triage-Bps 130/60-70  Urine prot/creat ratio 0 2 with normal platelets and liver enzymes  Denies HA, visual changes or RUQ pain  BP cuff from home used and noted to run about 10 mm above manual BP cuff  Manual 124/74-automatic 134/84  Takes at home when feels anxious  Discussed if elevated to lay down-deep breathing and recheck after 10 minutes  If remains elevated call

## 2020-08-18 NOTE — ASSESSMENT & PLAN NOTE
Here for BP check only  + FM  Denies UC  LOF, bleeding or HA, visual changes or RUQ pain  Briefly discussed IOL but not interested at this time  RTO 2 days for regular prenatal visit

## 2020-08-18 NOTE — PROGRESS NOTES
Patient here for BP check  Left arm readin/86- electronic cuff, 122/78- manual  Right arm readin/79-electronic cuff, 124/74 manual cuff  She reports good fetal movement  Urine neg for protein and glucose

## 2020-08-20 ENCOUNTER — ROUTINE PRENATAL (OUTPATIENT)
Dept: OBGYN CLINIC | Facility: CLINIC | Age: 30
End: 2020-08-20

## 2020-08-20 VITALS
SYSTOLIC BLOOD PRESSURE: 122 MMHG | TEMPERATURE: 98.8 F | DIASTOLIC BLOOD PRESSURE: 68 MMHG | BODY MASS INDEX: 39.17 KG/M2 | WEIGHT: 228.2 LBS

## 2020-08-20 DIAGNOSIS — O99.213 MATERNAL OBESITY, ANTEPARTUM, THIRD TRIMESTER: ICD-10-CM

## 2020-08-20 DIAGNOSIS — O26.899 RH NEGATIVE STATE IN ANTEPARTUM PERIOD: ICD-10-CM

## 2020-08-20 DIAGNOSIS — Z67.91 RH NEGATIVE STATE IN ANTEPARTUM PERIOD: ICD-10-CM

## 2020-08-20 DIAGNOSIS — R03.0 ELEVATED BLOOD PRESSURE READING WITHOUT DIAGNOSIS OF HYPERTENSION: ICD-10-CM

## 2020-08-20 DIAGNOSIS — Z34.03 ENCOUNTER FOR SUPERVISION OF NORMAL FIRST PREGNANCY IN THIRD TRIMESTER: Primary | ICD-10-CM

## 2020-08-20 LAB
SL AMB  POCT GLUCOSE, UA: NORMAL
SL AMB POCT URINE PROTEIN: NORMAL

## 2020-08-20 PROCEDURE — PNV: Performed by: NURSE PRACTITIONER

## 2020-08-20 NOTE — PATIENT INSTRUCTIONS
Pregnancy at 28 to [address]:   What changes are happening in my body? You are considered full term at the beginning of 37 weeks  Your breathing may be easier if your baby has moved down into a head-down position  You may need to urinate more often because the baby may be pressing on your bladder  You may also feel more discomfort and get tired easily  How do I care for myself at this stage of my pregnancy? · Eat a variety of healthy foods  Healthy foods include fruits, vegetables, whole-grain breads, low-fat dairy foods, beans, lean meats, and fish  Drink liquids as directed  Ask how much liquid to drink each day and which liquids are best for you  Limit caffeine to less than 200 milligrams each day  Limit your intake of fish to 2 servings each week  Choose fish low in mercury such as canned light tuna, shrimp, salmon, cod, or tilapia  Do not  eat fish high in mercury such as swordfish, tilefish, janet mackerel, and shark  · Take prenatal vitamins as directed  Your need for certain vitamins and minerals, such as folic acid, increases during pregnancy  Prenatal vitamins provide some of the extra vitamins and minerals you need  Prenatal vitamins may also help to decrease the risk of certain birth defects  · Rest as needed  Put your feet up if you have swelling in your ankles and feet  · Do not smoke  If you smoke, it is never too late to quit  Smoking increases your risk of a miscarriage and other health problems during your pregnancy  Smoking can cause your baby to be born too early or weigh less at birth  Ask your healthcare provider for information if you need help quitting  · Do not drink alcohol  Alcohol passes from your body to your baby through the placenta  It can affect your baby's brain development and cause fetal alcohol syndrome (FAS)  FAS is a group of conditions that causes mental, behavior, and growth problems       · Talk to your healthcare provider before you take any medicines  Many medicines may harm your baby if you take them when you are pregnant  Do not take any medicines, vitamins, herbs, or supplements without first talking to your healthcare provider  Never use illegal or street drugs (such as marijuana or cocaine) while you are pregnant  · Talk to your healthcare provider before you travel  You may not be able to travel in an airplane after 36 weeks  He may also recommend that you avoid long road trips  What are some safety tips during pregnancy? · Avoid hot tubs and saunas  Do not use a hot tub or sauna while you are pregnant, especially during your first trimester  Hot tubs and saunas may raise your baby's temperature and increase the risk of birth defects  · Avoid toxoplasmosis  This is an infection caused by eating raw meat or being around infected cat feces  It can cause birth defects, miscarriages, and other problems  Wash your hands after you touch raw meat  Make sure any meat is well-cooked before you eat it  Avoid raw eggs and unpasteurized milk  Use gloves or ask someone else to clean your cat's litter box while you are pregnant  · Ask your healthcare provider about travel  The most comfortable time to travel is during the second trimester  Ask your healthcare provider if you can travel after 36 weeks  You may not be able to travel in an airplane after 36 weeks  He may also recommend that you avoid long road trips  What changes are happening with my baby? By 38 weeks, your baby may weigh between 6 and 9 pounds  Your baby may be about 14 inches long from the top of the head to the rump (baby's bottom)  Your baby hears well enough to know your voice  As your baby gets larger, you may feel fewer kicks and more stretching and rolling  Your baby may move into a head-down position  Your baby will also rest lower in your abdomen  What do I need to know about prenatal care?   Your healthcare provider will check your blood pressure and weight  You may also need the following:  · A urine test  may also be done to check for sugar and protein  These can be signs of gestational diabetes or infection  Protein in your urine may also be a sign of preeclampsia  Preeclampsia is a condition that can develop during week 20 or later of your pregnancy  It causes high blood pressure, and it can cause problems with your kidneys and other organs  · A blood test  may be done to check for anemia (low iron level)  · A Tdap vaccine  may be recommended by your healthcare provider  · A group B strep test  is a test that is done to check for group B strep infection  Group B strep is a type of bacteria that may be found in the vagina or rectum  It can be passed to your baby during delivery if you have it  Your healthcare provider will take swab your vagina or rectum and send the sample to the lab for tests  · Fundal height  is a measurement of your uterus to check your baby's growth  This number is usually the same as the number of weeks that you have been pregnant  Your healthcare provider may also check your baby's position  · Your baby's heart rate  will be checked  When should I seek immediate care? · You develop a severe headache that does not go away  · You have new or increased vision changes, such as blurred or spotted vision  · You have new or increased swelling in your face or hands  · You have vaginal spotting or bleeding  · Your water broke or you feel warm water gushing or trickling from your vagina  When should I contact my healthcare provider? · You have more than 5 contractions in 1 hour  · You notice any changes in your baby's movements  · You have abdominal cramps, pressure, or tightening  · You have a change in vaginal discharge  · You have chills or a fever  · You have vaginal itching, burning, or pain  · You have yellow, green, white, or foul-smelling vaginal discharge      · You have pain or burning when you urinate, less urine than usual, or pink or bloody urine  · You have questions or concerns about your condition or care  CARE AGREEMENT:   You have the right to help plan your care  Learn about your health condition and how it may be treated  Discuss treatment options with your caregivers to decide what care you want to receive  You always have the right to refuse treatment  The above information is an  only  It is not intended as medical advice for individual conditions or treatments  Talk to your doctor, nurse or pharmacist before following any medical regimen to see if it is safe and effective for you  © 2017 [address] [employer] Information is for End User's use only and may not be sold, redistributed or otherwise used for commercial purposes  All illustrations and images included in CareNotes® are the copyrighted property of A D A M [employer] Inc  or [employer]

## 2020-08-20 NOTE — ASSESSMENT & PLAN NOTE
8/16 had elevated BP @ home 140/85  Sent to triage-Bps 130/60-70  Urine prot/creat ratio 0 2 with normal platelets and liver enzymes  Denies HA, visual changes or RUQ pain  Has not taken any more at home  Takes at home when feels anxious  Discussed if elevated to lay down-deep breathing and recheck after 10 minutes    If remains elevated call

## 2020-08-20 NOTE — PROGRESS NOTES
Elevated blood pressure reading without diagnosis of hypertension  8/16 had elevated BP @ home  140/85  Sent to triage-Bps 130/60-70  Urine prot/creat ratio 0 2 with normal platelets and liver enzymes  Denies HA, visual changes or RUQ pain  BP cuff from home used and noted to run about 10 mm above manual BP cuff  Manual 124/74-automatic 134/84  Takes at home when feels anxious  Discussed if elevated to lay down-deep breathing and recheck after 10 minutes  If remains elevated call  Encounter for supervision of normal first pregnancy in third trimester  Here for BP check only  + FM  Denies UC  LOF, bleeding or HA, visual changes or RUQ pain  Briefly discussed IOL but not interested at this time  RTO 2 days for regular prenatal visit

## 2020-08-20 NOTE — ASSESSMENT & PLAN NOTE
Stephanie White  is a 27 y o  Zach Nathaniel @38w4d who presents for routine prenatal visit  Denies LOF, vaginal bleeding, regular uterine contractions, cramping, headaches or visual changes  Reports good fetal movement  Taking PNV daily as prescribed  TDap up to date  Started her medical leave today  Reviewed labor precautions and [facility]  Normal growth scan @ 32 weeks  No further recommended   GBS negative  Plans to breastfeed  Having a girl  Not interested in IOL    Aware will schedule next week   Has last few weeks of pregnancy & Perineal massage papers  Aware of visitor and masking policies

## 2020-08-20 NOTE — PROGRESS NOTES
Patient denies any cramping, LOF or bleeding  Good fetal movement  Urine neg for protein and glucose  She would like to be examined  She is having a girl!

## 2020-08-20 NOTE — PROGRESS NOTES
Rh negative state in antepartum period  S/P rhogam     Elevated blood pressure reading without diagnosis of hypertension  8/16 had elevated BP @ home 140/85  Sent to triage-Bps 130/60-70  Urine prot/creat ratio 0 2 with normal platelets and liver enzymes  Denies HA, visual changes or RUQ pain  Has not taken any more at home  Takes at home when feels anxious  Discussed if elevated to lay down-deep breathing and recheck after 10 minutes  If remains elevated call          Encounter for supervision of normal first pregnancy in third trimester    Nia Albright  is a 27 y o  Tom Bullocks @38w4d who presents for routine prenatal visit  Denies LOF, vaginal bleeding, regular uterine contractions, cramping, headaches or visual changes  Reports good fetal movement  Taking PNV daily as prescribed  TDap up to date  Started her medical leave today  Reviewed labor precautions and [facility]  Normal growth scan @ 32 weeks  No further recommended   GBS negative  Plans to breastfeed  Having a girl  Not interested in IOL  Aware will schedule next week   Has last few weeks of pregnancy & Perineal massage papers  Aware of visitor and masking policies            Maternal obesity, antepartum, third trimester  TWG 49 #  Encouraged daily walking and continue ASA

## 2020-08-25 ENCOUNTER — ROUTINE PRENATAL (OUTPATIENT)
Dept: OBGYN CLINIC | Facility: CLINIC | Age: 30
End: 2020-08-25

## 2020-08-25 ENCOUNTER — TELEPHONE (OUTPATIENT)
Dept: OBGYN CLINIC | Facility: CLINIC | Age: 30
End: 2020-08-25

## 2020-08-25 VITALS
BODY MASS INDEX: 39.48 KG/M2 | TEMPERATURE: 97.8 F | WEIGHT: 230 LBS | SYSTOLIC BLOOD PRESSURE: 132 MMHG | DIASTOLIC BLOOD PRESSURE: 80 MMHG

## 2020-08-25 DIAGNOSIS — Z34.83 PRENATAL CARE, SUBSEQUENT PREGNANCY, THIRD TRIMESTER: Primary | ICD-10-CM

## 2020-08-25 LAB
SL AMB  POCT GLUCOSE, UA: NEGATIVE
SL AMB POCT URINE PROTEIN: NEGATIVE

## 2020-08-25 PROCEDURE — PNV: Performed by: STUDENT IN AN ORGANIZED HEALTH CARE EDUCATION/TRAINING PROGRAM

## 2020-08-25 NOTE — PROGRESS NOTES
Elevated blood pressure reading without diagnosis of hypertension  BP normal today  No symptoms  Encounter for supervision of normal first pregnancy in third trimester   29 Yo  at 39+2 here for routine ob  No contractions, leaking or bleeding  Good fetal movement  Looking for IOL at or around due date  Cervix ft//hi (reviewed likely cytotec blanc but also reviewed process of pit an arom)  Cephalic by leopolds  Maternal obesity, antepartum, third trimester  Encouraged with relatively stable weight since 34 wks  TWG 50# and previously discussed risk of CS, wound infection, dystocia associated with elevated weight gain

## 2020-08-25 NOTE — TELEPHONE ENCOUNTER
----- Message from Beverli Primrose, MD sent at 8/25/2020  8:45 AM EDT -----  Hello! This patient requests elective induction on or after due date 8/30 or 31 if able  Would be for ripening  If picking 8/30 please check with Sunday on call doc otherwise 8/31 fine  Thanks!

## 2020-08-25 NOTE — ASSESSMENT & PLAN NOTE
Encouraged with relatively stable weight since 34 wks  TWG 50# and previously discussed risk of CS, wound infection, dystocia associated with elevated weight gain

## 2020-08-25 NOTE — TELEPHONE ENCOUNTER
P  C  To L & D, per Brittnee,  Pt scheduled for 9/1/2020 at 8 PM - informed pt of time & date & COVID-19 rules/regulations reviewed   Routed to on-call Drs

## 2020-08-25 NOTE — ASSESSMENT & PLAN NOTE
Juan Alberto  1560 at 39+2 here for routine ob  No contractions, leaking or bleeding  Good fetal movement  Looking for IOL at or around due date  Cervix ft//hi (reviewed likely cytotec blanc but also reviewed process of pit an arom)  Cephalic by leopolds

## 2020-08-27 ENCOUNTER — TELEPHONE (OUTPATIENT)
Dept: PERINATAL CARE | Facility: OTHER | Age: 30
End: 2020-08-27

## 2020-08-27 NOTE — TELEPHONE ENCOUNTER
Spoke with patient and confirmed appointment with MFM  1 support person (must be over age of 15) may accompany patient  Will you and your support person be able to wear a mask, without a valve, during entire appointment? yes  To minimize your exposure in our waiting area,check in and rooming questions will be done via phone  When you arrive in the parking lot please call the following inside line # prior to entering office:    [facility] line:  [phone]    Have you or your support person traveled outside the state in the last 2 weeks? No   If yes, what state did you travel to? n/a     Do you or your support person have:  Fever or flu-like symptoms? No  Symptoms of upper respiratory infection like runny nose, sore throat or cough? No  Do you have new headache that you have not had in the past? No  Have you experienced any new shortness of breath recently? No  Do you have any new loss of taste or smell? No  Do you have any new diarrhea, nausea or vomiting? No  Have you recently been in contact with anyone who has been sick or diagnosed with COVID-19 infection? No  Have you been recommended to quarantine because of an exposure to a confirmed positive COVID19 person? No  You and your support person will have temperature screening upon arrival     Patient verbalized understanding of all instructions

## 2020-08-28 ENCOUNTER — ULTRASOUND (OUTPATIENT)
Dept: PERINATAL CARE | Facility: OTHER | Age: 30
End: 2020-08-28
Payer: COMMERCIAL

## 2020-08-28 VITALS
DIASTOLIC BLOOD PRESSURE: 58 MMHG | WEIGHT: 230.4 LBS | HEIGHT: 64 IN | BODY MASS INDEX: 39.34 KG/M2 | HEART RATE: 74 BPM | TEMPERATURE: 96.9 F | SYSTOLIC BLOOD PRESSURE: 107 MMHG

## 2020-08-28 DIAGNOSIS — O99.213 MATERNAL OBESITY, ANTEPARTUM, THIRD TRIMESTER: Primary | ICD-10-CM

## 2020-08-28 DIAGNOSIS — Z3A.39 39 WEEKS GESTATION OF PREGNANCY: ICD-10-CM

## 2020-08-28 PROBLEM — O09.93 HIGH-RISK PREGNANCY IN THIRD TRIMESTER: Status: ACTIVE | Noted: 2020-08-04

## 2020-08-28 PROCEDURE — 59025 FETAL NON-STRESS TEST: CPT | Performed by: OBSTETRICS & GYNECOLOGY

## 2020-08-28 PROCEDURE — 76815 OB US LIMITED FETUS(S): CPT | Performed by: OBSTETRICS & GYNECOLOGY

## 2020-08-28 NOTE — LETTER
August 28, 2020     Jenny Villanueva [address]  [address]    Patient: Heath De Los Santos   YOB: 1990   Date of Visit: 8/28/2020       Dear Dr Denis Livingston: Thank you for referring Heath De Los Santos to me for evaluation  Below are my notes for this consultation  If you have questions, please do not hesitate to call me  I look forward to following your patient along with you           Sincerely,        Marylen Bryant, MD        CC: No Recipients  Marylen Bryant, MD  8/28/2020 12:55 PM  Sign when Signing Visit  NST and SARAH today appear normal and are being done for the diagnosis of

## 2020-08-28 NOTE — PATIENT INSTRUCTIONS
Kick Counts in Pregnancy   AMBULATORY CARE:   Kick counts  measure how much your baby is moving in your womb  A kick from your baby can be felt as a twist, turn, swish, roll, or jab  It is common to feel your baby kicking at 26 to 28 weeks of pregnancy  You may feel your baby kick as early as 20 weeks of pregnancy  Seek care immediately if:   · You feel your baby kick less as the day goes on      · You do not feel any kicks in a day  Contact your healthcare provider if:   · You feel a change in the number of kicks or movements of your baby  · You feel fewer than 10 kicks within 2 hours  · You have questions or concerns about your baby's movements  Why measure kick counts:  Your baby's movement may provide information about your baby's health  He may move less, or not at all, if there are problems  He may move less if he does not have enough room to grow in your uterus (womb)  He may also move less if he is not getting enough oxygen or nutrition from the placenta  Tell your healthcare provider as soon as you feel a change in your baby's movements  Problems that are found earlier are easier to treat  When to measure kick counts:   · Measure kick counts at the same time every day  · Measure kick counts when your baby is awake and most active  Your baby may be most active in the evening  · Measure kick counts after a meal or snack  Your baby may be more active after you eat  Wait 2 hours after you drink liquids that contain caffeine  Caffeine can make your baby more active than usual     · You should not smoke while you are pregnant  Smoking increases the risk of health problems for you and for your baby during your pregnancy  If you do smoke, wait 2 hours to measure kick counts  Nicotine can make your baby more active than usual   How to measure kick counts:  Check that your baby is awake before you measure kick counts   You can wake up your baby by lightly pushing on your belly, walking, or drinking something cold  Your healthcare provider may tell you different ways to measure kick counts  He may tell you to do the following:  · Use a chart or clock to keep track of the time you start and finish counting  · Sit in a chair or lie on your left side  · Place your hands on the largest part of your belly  · Count until you reach 10 kicks  Write down how much time it takes to count 10 kicks  · It may take 30 minutes to 2 hours to count 10 kicks  It should not take more than 2 hours to count 10 kicks  If you do not feel 10 kicks within 2 hoursNonstress Test for Pregnancy   WHAT YOU NEED TO KNOW:   What do I need to know about a nonstress test?  A nonstress test measures your baby's heart rate and movements  Nonstress means that no stress will be placed on your baby during the test    How do I prepare for a nonstress test?  Your healthcare provider will talk to you about how to prepare for this test  He may tell you to eat and drink plenty of fluids before your test  If you smoke, you may be asked not to smoke within 2 hours before the test  He will also tell you what medicines to take or not take on the day of your test    What will happen during a nonstress test?  You may be asked to lie down or recline back for the test on a bed  One or two belts with sensors will be placed around your abdomen  Your baby's heart rate will be recorded with a machine  If your baby does not move, your baby may be asleep  Your healthcare provider may make a noise near your abdomen to try to wake your baby  The test usually takes about 20 minutes, but can take longer if your baby needs to be awakened  What do I need to know about the test results? Your baby will be expected to move at least twice for a certain amount of time  Your baby's heart rate will be expected to go up by a certain number of beats per minute during movement   If your baby does not move as expected, the test may need to be repeated or you may need other tests  CARE AGREEMENT:   You have the right to help plan your care  Learn about your health condition and how it may be treated  Discuss treatment options with your caregivers to decide what care you want to receive  You always have the right to refuse treatment  The above information is an  only  It is not intended as medical advice for individual conditions or treatments  Talk to your doctor, nurse or pharmacist before following any medical regimen to see if it is safe and effective for you  © 2017 [address] [employer] Information is for End User's use only and may not be sold, redistributed or otherwise used for commercial purposes  All illustrations and images included in CareNotes® are the copyrighted property of [employer]  or [employer]  ·   Your baby can sleep for up to 40 minutes at one time  Follow up with your healthcare provider as directed:  Write down your questions so you remember to ask them during your visits  © 2017 [address] [employer] Information is for End User's use only and may not be sold, redistributed or otherwise used for commercial purposes  All illustrations and images included in CareNotes® are the copyrighted property of [employer]  or [employer]  The above information is an  only  It is not intended as medical advice for individual conditions or treatments  Talk to your doctor, nurse or pharmacist before following any medical regimen to see if it is safe and effective for you

## 2020-08-28 NOTE — PROGRESS NOTES
NST procedure and expected outcome explained to patient  Daily fetal kick count discussed with handout given  Patient verbalized understanding of all and was receptive      Jaziel Llanos RN

## 2020-08-28 NOTE — PROGRESS NOTES
NST and SARAH today appear normal and are being done her being near her due date  An incidental BPP was also noted that was reassuring       Sherwin Alonzo MD

## 2020-08-28 NOTE — LETTER
NST sleeve cover sheet    Patient name: Katerina Grove  : 1990  MRN: [id number]    HAYLEE: Estimated Date of Delivery: 20                  Obstetrician: ________Sloga_______________________    Reason(s) for testing:  __________________________________________      Testing frequency:    ___ 2x/wk  ___ 1x/wk  ___ Dopplers  ___ BPP?       Last growth scan: __________________________________________

## 2020-09-01 ENCOUNTER — APPOINTMENT (OUTPATIENT)
Dept: LABOR AND DELIVERY | Facility: HOSPITAL | Age: 30
End: 2020-09-01
Payer: COMMERCIAL

## 2020-09-01 ENCOUNTER — HOSPITAL ENCOUNTER (INPATIENT)
Facility: HOSPITAL | Age: 30
LOS: 4 days | Discharge: HOME | End: 2020-09-05
Attending: OBSTETRICS & GYNECOLOGY | Admitting: STUDENT IN AN ORGANIZED HEALTH CARE EDUCATION/TRAINING PROGRAM
Payer: COMMERCIAL

## 2020-09-01 DIAGNOSIS — Z98.891 STATUS POST PRIMARY LOW TRANSVERSE CESAREAN SECTION: Primary | ICD-10-CM

## 2020-09-01 DIAGNOSIS — Z3A.39 39 WEEKS GESTATION OF PREGNANCY: ICD-10-CM

## 2020-09-01 DIAGNOSIS — O36.8390 NON-REASSURING FETAL HEART TONES COMPLICATING PREGNANCY, ANTEPARTUM: ICD-10-CM

## 2020-09-01 PROBLEM — Z3A.40 40 WEEKS GESTATION OF PREGNANCY: Status: ACTIVE | Noted: 2020-09-01

## 2020-09-01 PROBLEM — O13.3 GESTATIONAL HYPERTENSION, THIRD TRIMESTER: Status: ACTIVE | Noted: 2020-08-18

## 2020-09-01 LAB
ABO GROUP BLD: NORMAL
ALBUMIN SERPL BCP-MCNC: 2.7 G/DL (ref 3.5–5)
ALP SERPL-CCNC: 114 U/L (ref 46–116)
ALT SERPL W P-5'-P-CCNC: 26 U/L (ref 12–78)
ANION GAP SERPL CALCULATED.3IONS-SCNC: 12 MMOL/L (ref 4–13)
AST SERPL W P-5'-P-CCNC: 17 U/L (ref 5–45)
BILIRUB SERPL-MCNC: 0.64 MG/DL (ref 0.2–1)
BLD GP AB SCN SERPL QL: POSITIVE
BLOOD GROUP ANTIBODIES SERPL: NORMAL
BUN SERPL-MCNC: 11 MG/DL (ref 5–25)
CALCIUM SERPL-MCNC: 10.2 MG/DL (ref 8.3–10.1)
CHLORIDE SERPL-SCNC: 104 MMOL/L (ref 100–108)
CO2 SERPL-SCNC: 19 MMOL/L (ref 21–32)
CREAT SERPL-MCNC: 0.51 MG/DL (ref 0.6–1.3)
CREAT UR-MCNC: 30 MG/DL
ERYTHROCYTE [DISTWIDTH] IN BLOOD BY AUTOMATED COUNT: 12.8 % (ref 11.6–15.1)
GFR SERPL CREATININE-BSD FRML MDRD: 129 ML/MIN/1.73SQ M
GLUCOSE SERPL-MCNC: 76 MG/DL (ref 65–140)
HCT VFR BLD AUTO: 32.5 % (ref 34.8–46.1)
HGB BLD-MCNC: 10.9 G/DL (ref 11.5–15.4)
HOLD SPECIMEN: NORMAL
MCH RBC QN AUTO: 30.9 PG (ref 26.8–34.3)
MCHC RBC AUTO-ENTMCNC: 33.5 G/DL (ref 31.4–37.4)
MCV RBC AUTO: 92 FL (ref 82–98)
PLATELET # BLD AUTO: 299 THOUSANDS/UL (ref 149–390)
PMV BLD AUTO: 10.3 FL (ref 8.9–12.7)
POTASSIUM SERPL-SCNC: 3.7 MMOL/L (ref 3.5–5.3)
PROT SERPL-MCNC: 6.6 G/DL (ref 6.4–8.2)
PROT UR-MCNC: <6 MG/DL
PROT/CREAT UR: <0.2 MG/G{CREAT} (ref 0–0.1)
RBC # BLD AUTO: 3.53 MILLION/UL (ref 3.81–5.12)
RH BLD: NEGATIVE
SODIUM SERPL-SCNC: 135 MMOL/L (ref 136–145)
SPECIMEN EXPIRATION DATE: NORMAL
WBC # BLD AUTO: 12.33 THOUSAND/UL (ref 4.31–10.16)

## 2020-09-01 PROCEDURE — 86592 SYPHILIS TEST NON-TREP QUAL: CPT | Performed by: STUDENT IN AN ORGANIZED HEALTH CARE EDUCATION/TRAINING PROGRAM

## 2020-09-01 PROCEDURE — 10907ZC DRAINAGE OF AMNIOTIC FLUID, THERAPEUTIC FROM PRODUCTS OF CONCEPTION, VIA NATURAL OR ARTIFICIAL OPENING: ICD-10-PCS | Performed by: OBSTETRICS & GYNECOLOGY

## 2020-09-01 PROCEDURE — 85027 COMPLETE CBC AUTOMATED: CPT | Performed by: STUDENT IN AN ORGANIZED HEALTH CARE EDUCATION/TRAINING PROGRAM

## 2020-09-01 PROCEDURE — 99024 POSTOP FOLLOW-UP VISIT: CPT | Performed by: STUDENT IN AN ORGANIZED HEALTH CARE EDUCATION/TRAINING PROGRAM

## 2020-09-01 PROCEDURE — 86870 RBC ANTIBODY IDENTIFICATION: CPT | Performed by: STUDENT IN AN ORGANIZED HEALTH CARE EDUCATION/TRAINING PROGRAM

## 2020-09-01 PROCEDURE — 84156 ASSAY OF PROTEIN URINE: CPT | Performed by: STUDENT IN AN ORGANIZED HEALTH CARE EDUCATION/TRAINING PROGRAM

## 2020-09-01 PROCEDURE — 80053 COMPREHEN METABOLIC PANEL: CPT | Performed by: STUDENT IN AN ORGANIZED HEALTH CARE EDUCATION/TRAINING PROGRAM

## 2020-09-01 PROCEDURE — 4A1HXCZ MONITORING OF PRODUCTS OF CONCEPTION, CARDIAC RATE, EXTERNAL APPROACH: ICD-10-PCS | Performed by: OBSTETRICS & GYNECOLOGY

## 2020-09-01 PROCEDURE — 3E0P7VZ INTRODUCTION OF HORMONE INTO FEMALE REPRODUCTIVE, VIA NATURAL OR ARTIFICIAL OPENING: ICD-10-PCS | Performed by: OBSTETRICS & GYNECOLOGY

## 2020-09-01 PROCEDURE — 86900 BLOOD TYPING SEROLOGIC ABO: CPT | Performed by: STUDENT IN AN ORGANIZED HEALTH CARE EDUCATION/TRAINING PROGRAM

## 2020-09-01 PROCEDURE — 10H07YZ INSERTION OF OTHER DEVICE INTO PRODUCTS OF CONCEPTION, VIA NATURAL OR ARTIFICIAL OPENING: ICD-10-PCS | Performed by: OBSTETRICS & GYNECOLOGY

## 2020-09-01 PROCEDURE — 3E033VJ INTRODUCTION OF OTHER HORMONE INTO PERIPHERAL VEIN, PERCUTANEOUS APPROACH: ICD-10-PCS | Performed by: OBSTETRICS & GYNECOLOGY

## 2020-09-01 PROCEDURE — 86901 BLOOD TYPING SEROLOGIC RH(D): CPT | Performed by: STUDENT IN AN ORGANIZED HEALTH CARE EDUCATION/TRAINING PROGRAM

## 2020-09-01 PROCEDURE — 82570 ASSAY OF URINE CREATININE: CPT | Performed by: STUDENT IN AN ORGANIZED HEALTH CARE EDUCATION/TRAINING PROGRAM

## 2020-09-01 PROCEDURE — 86850 RBC ANTIBODY SCREEN: CPT | Performed by: STUDENT IN AN ORGANIZED HEALTH CARE EDUCATION/TRAINING PROGRAM

## 2020-09-01 RX ORDER — ONDANSETRON 2 MG/ML
4 INJECTION INTRAMUSCULAR; INTRAVENOUS EVERY 6 HOURS PRN
Status: DISCONTINUED | OUTPATIENT
Start: 2020-09-01 | End: 2020-09-03

## 2020-09-01 RX ORDER — BUTORPHANOL TARTRATE 1 MG/ML
1 INJECTION, SOLUTION INTRAMUSCULAR; INTRAVENOUS
Status: DISCONTINUED | OUTPATIENT
Start: 2020-09-01 | End: 2020-09-03

## 2020-09-01 RX ORDER — SODIUM CHLORIDE, SODIUM LACTATE, POTASSIUM CHLORIDE, CALCIUM CHLORIDE 600; 310; 30; 20 MG/100ML; MG/100ML; MG/100ML; MG/100ML
125 INJECTION, SOLUTION INTRAVENOUS CONTINUOUS
Status: DISCONTINUED | OUTPATIENT
Start: 2020-09-01 | End: 2020-09-03

## 2020-09-01 RX ADMIN — MISOPROSTOL 25 MCG: 100 TABLET ORAL at 23:17

## 2020-09-01 RX ADMIN — SODIUM CHLORIDE, SODIUM LACTATE, POTASSIUM CHLORIDE, AND CALCIUM CHLORIDE 999 ML/HR: .6; .31; .03; .02 INJECTION, SOLUTION INTRAVENOUS at 21:08

## 2020-09-02 ENCOUNTER — ANESTHESIA EVENT (INPATIENT)
Dept: LABOR AND DELIVERY | Facility: HOSPITAL | Age: 30
End: 2020-09-02
Payer: COMMERCIAL

## 2020-09-02 ENCOUNTER — ANESTHESIA (INPATIENT)
Dept: LABOR AND DELIVERY | Facility: HOSPITAL | Age: 30
End: 2020-09-02
Payer: COMMERCIAL

## 2020-09-02 LAB — RPR SER QL: NORMAL

## 2020-09-02 RX ORDER — ROPIVACAINE HYDROCHLORIDE 2 MG/ML
INJECTION, SOLUTION EPIDURAL; INFILTRATION; PERINEURAL AS NEEDED
Status: DISCONTINUED | OUTPATIENT
Start: 2020-09-02 | End: 2020-09-04

## 2020-09-02 RX ORDER — LIDOCAINE HYDROCHLORIDE AND EPINEPHRINE 15; 5 MG/ML; UG/ML
INJECTION, SOLUTION EPIDURAL
Status: COMPLETED | OUTPATIENT
Start: 2020-09-02 | End: 2020-09-02

## 2020-09-02 RX ORDER — OXYTOCIN/RINGER'S LACTATE 30/500 ML
1-30 PLASTIC BAG, INJECTION (ML) INTRAVENOUS
Status: DISCONTINUED | OUTPATIENT
Start: 2020-09-02 | End: 2020-09-03

## 2020-09-02 RX ADMIN — SODIUM CHLORIDE, SODIUM LACTATE, POTASSIUM CHLORIDE, AND CALCIUM CHLORIDE 125 ML/HR: .6; .31; .03; .02 INJECTION, SOLUTION INTRAVENOUS at 16:43

## 2020-09-02 RX ADMIN — ROPIVACAINE HYDROCHLORIDE 2 ML: 2 INJECTION, SOLUTION EPIDURAL; INFILTRATION at 18:49

## 2020-09-02 RX ADMIN — Medication 2 MILLI-UNITS/MIN: at 11:26

## 2020-09-02 RX ADMIN — LIDOCAINE HYDROCHLORIDE AND EPINEPHRINE 3 ML: 15; 5 INJECTION, SOLUTION EPIDURAL at 18:58

## 2020-09-02 RX ADMIN — SODIUM CHLORIDE, SODIUM LACTATE, POTASSIUM CHLORIDE, AND CALCIUM CHLORIDE 125 ML/HR: .6; .31; .03; .02 INJECTION, SOLUTION INTRAVENOUS at 00:35

## 2020-09-02 RX ADMIN — BUTORPHANOL TARTRATE 1 MG: 1 INJECTION, SOLUTION INTRAMUSCULAR; INTRAVENOUS at 09:54

## 2020-09-02 RX ADMIN — ROPIVACAINE HYDROCHLORIDE 3 ML: 2 INJECTION, SOLUTION EPIDURAL; INFILTRATION at 18:51

## 2020-09-02 RX ADMIN — ROPIVACAINE HYDROCHLORIDE: 2 INJECTION, SOLUTION EPIDURAL; INFILTRATION at 18:48

## 2020-09-02 RX ADMIN — SODIUM CHLORIDE, SODIUM LACTATE, POTASSIUM CHLORIDE, AND CALCIUM CHLORIDE 125 ML/HR: .6; .31; .03; .02 INJECTION, SOLUTION INTRAVENOUS at 08:40

## 2020-09-02 RX ADMIN — SODIUM CHLORIDE, SODIUM LACTATE, POTASSIUM CHLORIDE, AND CALCIUM CHLORIDE 125 ML/HR: .6; .31; .03; .02 INJECTION, SOLUTION INTRAVENOUS at 19:19

## 2020-09-02 RX ADMIN — ROPIVACAINE HYDROCHLORIDE 3 ML: 2 INJECTION, SOLUTION EPIDURAL; INFILTRATION at 18:47

## 2020-09-02 NOTE — OB LABOR/OXYTOCIN SAFETY PROGRESS
Oxytocin Safety Progress Check Note - Shira Villa 27 y o  female MRN: [id number]    Unit/Bed#: -01 Encounter: [id number]    Dose (hoang-units/min) Oxytocin: 14 hoang-units/min  Contraction Frequency (minutes): 1-3 5  Contraction Quality: Moderate  Tachysystole: No   Cervical check deferred  Baseline Rate: 140 bpm  Fetal Heart Rate: 152 BPM  FHR Category: Category II  Oxytocin Safety Progress Check: Safety check completed            Vital Signs: reviewed  Vitals:    09/02/20 1629   BP: 120/66   Pulse: 70   Resp:    Temp:    SpO2:            Notes/comments:    Patient awaiting epidural  Will do cervical check after she receives an epidural, and possible AROM at that time  Continue Pitocin titration  To be discussed with Dr Maldonado Soriano MD 9/2/2020 6:15 PM

## 2020-09-02 NOTE — ANESTHESIA PROCEDURE NOTES
Epidural Block    Patient location during procedure: OB  Start time: 9/2/2020 6:39 PM  Reason for block: at surgeon's request and primary anesthetic  Staffing  Anesthesiologist: Jose Martínez MD  Performed: anesthesiologist   Preanesthetic Checklist  Completed: patient identified, surgical consent, pre-op evaluation, timeout performed, IV checked, risks and benefits discussed and monitors and equipment checked  Epidural  Patient position: sitting  Prep: Betadine, site prepped and draped and swab x3  Patient monitoring: heart rate, continuous pulse ox and frequent blood pressure checks  Approach: midline  Location: lumbar (1-5)  Injection technique: BONNIE saline  Needle  Needle type: Tuohy   Needle gauge: 18 G  Catheter type: side hole  Catheter size: 20 G  Catheter at skin depth: 12 cm  Test dose: negativelidocaine 1 5% with epinephrine 1:200,000 test dose, 3 mLnegative aspiration for CSF, negative aspiration for heme and no paresthesia on injection  patient tolerated the procedure well with no immediate complications

## 2020-09-02 NOTE — OB LABOR/OXYTOCIN SAFETY PROGRESS
Labor Progress Note - Shazia Deon 27 y o  female MRN: [id number]    Unit/Bed#: -01 Encounter: [id number]       Contraction Frequency (minutes): 2-5  Contraction Quality: Mild  Tachysystole: No   Cervical Dilation: Fingertip        Cervical Effacement: 0  Fetal Station: Ballotable  Baseline Rate: 130 bpm  Fetal Heart Rate: 132 BPM  FHR Category: Category I               Vital Signs: reviewed  Vitals:    09/02/20 0359   BP: 135/79   Pulse: 69   Resp:    Temp:    SpO2:            Notes/comments: blanc balloon attempted but could not be completed  Cervical exam unchanged  Considering second dose of cytotec and reattempting blanc at a later time  To be discussed with Dr Gail Winters MD 9/2/2020 4:06 AM

## 2020-09-02 NOTE — PLAN OF CARE
Problem: PAIN - ADULT  Goal: Verbalizes/displays adequate comfort level or baseline comfort level  Description: Interventions:  - Encourage patient to monitor pain and request assistance  - Assess pain using appropriate pain scale  - Administer analgesics based on type and severity of pain and evaluate response  - Implement non-pharmacological measures as appropriate and evaluate response  - Consider cultural and social influences on pain and pain management  - Notify physician/advanced practitioner if interventions unsuccessful or patient reports new pain  Outcome: Progressing     Problem: INFECTION - ADULT  Goal: Absence or prevention of progression during hospitalization  Description: INTERVENTIONS:  - Assess and monitor for signs and symptoms of infection  - Monitor lab/diagnostic results  - Monitor all insertion sites, i e  indwelling lines, tubes, and drains  - Monitor endotracheal if appropriate and nasal secretions for changes in amount and color  - [place] appropriate cooling/warming therapies per order  - Administer medications as ordered  - Instruct and encourage patient and family to use good hand hygiene technique  - Identify and instruct in appropriate isolation precautions for identified infection/condition  Outcome: Progressing  Goal: Absence of fever/infection during neutropenic period  Description: INTERVENTIONS:  - Monitor WBC    Outcome: Progressing     Problem: SAFETY ADULT  Goal: Patient will remain free of falls  Description: INTERVENTIONS:  - Assess patient frequently for physical needs  -  Identify cognitive and physical deficits and behaviors that affect risk of falls    -  [place] fall precautions as indicated by assessment   - Educate patient/family on patient safety including physical limitations  - Instruct patient to call for assistance with activity based on assessment  - Modify environment to reduce risk of injury  - Consider OT/PT consult to assist with strengthening/mobility  Outcome: Progressing  Goal: Maintain or return to baseline ADL function  Description: INTERVENTIONS:  -  Assess patient's ability to carry out ADLs; assess patient's baseline for ADL function and identify physical deficits which impact ability to perform ADLs (bathing, care of mouth/teeth, toileting, grooming, dressing, etc )  - Assess/evaluate cause of self-care deficits   - Assess range of motion  - Assess patient's mobility; develop plan if impaired  - Assess patient's need for assistive devices and provide as appropriate  - Encourage maximum independence but intervene and supervise when necessary  - Involve family in performance of ADLs  - Assess for home care needs following discharge   - Consider OT consult to assist with ADL evaluation and planning for discharge  - Provide patient education as appropriate  Outcome: Progressing  Goal: Maintain or return mobility status to optimal level  Description: INTERVENTIONS:  - Assess patient's baseline mobility status (ambulation, transfers, stairs, etc )    - Identify cognitive and physical deficits and behaviors that affect mobility  - Identify mobility aids required to assist with transfers and/or ambulation (gait belt, sit-to-stand, lift, walker, cane, etc )  - [place] fall precautions as indicated by assessment  - Record patient progress and toleration of activity level on Mobility SBAR; progress patient to next Phase/Stage  - Instruct patient to call for assistance with activity based on assessment  - Consider rehabilitation consult to assist with strengthening/weightbearing, etc   Outcome: Progressing     Problem: Knowledge Deficit  Goal: Patient/family/caregiver demonstrates understanding of disease process, treatment plan, medications, and discharge instructions  Description: Complete learning assessment and assess knowledge base    Interventions:  - Provide teaching at level of understanding  - Provide teaching via preferred learning methods  Outcome: Progressing  Goal: Verbalizes understanding of labor plan  Description: Assess patient/family/caregiver's baseline knowledge level and ability to understand information  Provide education via patient/family/caregiver's preferred learning method at appropriate level of understanding  1  Provide teaching at level of understanding  2  Provide teaching via preferred learning method(s)  Outcome: Progressing     Problem: DISCHARGE PLANNING  Goal: Discharge to home or other facility with appropriate resources  Description: INTERVENTIONS:  - Identify barriers to discharge w/patient and caregiver  - Arrange for needed discharge resources and transportation as appropriate  - Identify discharge learning needs (meds, wound care, etc )  - Arrange for interpretive services to assist at discharge as needed  - Refer to Case Management Department for coordinating discharge planning if the patient needs post-hospital services based on physician/advanced practitioner order or complex needs related to functional status, cognitive ability, or social support system  Outcome: Progressing     Problem: Labor & Delivery  Goal: Manages discomfort  Description: Assess and monitor for signs and symptoms of discomfort  Assess patient's pain level regularly and per hospital policy  Administer medications as ordered  Support use of nonpharmacological methods to help control pain such as distraction, imagery, relaxation, and application of heat and cold  Collaborate with interdisciplinary team and patient to determine appropriate pain management plan  1  Include patient in decisions related to comfort  2  Offer non-pharmacological pain management interventions  3  Report ineffective pain management to physician  Outcome: Progressing  Goal: Patient vital signs are stable  Description: 1  Assess vital signs - vaginal delivery    Outcome: Progressing

## 2020-09-02 NOTE — H&P
H&P Exam - Obstetrics   Jignesh Carrero 27 y o  female MRN: [id number]  Unit/Bed#: -01 Encounter: [id number]      History of Present Illness   Chief Complaint: "I'm here for my induction"    HPI:  Jignesh Carrero is a 27 y o   female with an HAYLEE of 2020, by Last Menstrual Period at 40w2d weeks gestation who is being admitted for induction of labor, post-dates  Contractions: rare  Leakage of fluid: None  Bleeding: None  Fetal movement: present  Pregnancy complications:   1  History LEEP  2  Elevated BP without diagnosis of HTN - on admission elevated BP, meets criteria for gestational hypertension at this time  3  BMI 39  4  Former smoker with secondhand smoke exposure  5  Rh negative status  6  Anxiety     Patient of [facility]    Review of Systems   All other systems reviewed and are negative         Historical Information   OB History    Para Term  AB Living   2 0 0 0 1 0   SAB TAB Ectopic Multiple Live Births   1 0 0 0 0      # Outcome Date GA Lbr Jose Daniel/2nd Weight Sex Delivery Anes PTL Lv   2 Current            1 SAB 2018 6w0d              Past Medical History:   Diagnosis Date    Abnormal Pap smear of cervix 2015    Anxiety      Past Surgical History:   Procedure Laterality Date    CERVICAL BIOPSY  W/ LOOP ELECTRODE EXCISION  2015    WISDOM TOOTH EXTRACTION  2017     Social History   Social History     Substance and Sexual Activity   Alcohol Use Not Currently    Frequency: 4 or more times a week    Drinks per session: 1 or 2    Comment: daily-stopped with confirmed preg      Social History     Substance and Sexual Activity   Drug Use Never     Social History     Tobacco Use   Smoking Status Former Smoker    Packs/day: 0 50    Years: 10 00    Pack years: 5 00    Types: Cigarettes    Start date: 2005   [facility] Last attempt to quit: 2020    Years since quittin 6   Smokeless Tobacco Never Used   Tobacco Comment    quit with confirmed preg      Family History   Problem Relation Age of Onset    Thyroid disease Mother     Asthma Mother     Thyroid disease Father     Thyroid disease Maternal Grandmother     Alzheimer's disease Maternal Grandfather     Heart disease Paternal Grandmother        Meds/Allergies   All medications and allergies reviewed  Allergies   Allergen Reactions    Penicillins Vomiting       Objective   Vitals: Blood pressure 141/83, pulse 78, temperature 98 9 °F (37 2 °C), temperature source Oral, resp  rate 18, height 5' 4" (1 626 m), weight 104 kg (230 lb), last menstrual period 2019, SpO2 97 %  Body mass index is 39 48 kg/m²  Physical Exam  Vitals signs and nursing note reviewed  Constitutional:       Appearance: Normal appearance  Cardiovascular:      Rate and Rhythm: Normal rate  Pulmonary:      Effort: Pulmonary effort is normal    Skin:     General: Skin is warm and dry  Neurological:      General: No focal deficit present  Mental Status: She is alert and oriented to person, place, and time  Psychiatric:         Mood and Affect: Mood normal         General:  NAD, AAOx3    Heart:  regular rate and rhythm   Chest:  lungs clear to auscultation bilaterally   Abdomen:  soft, non-distended, gravid   EFW 2 3-2 lbs  Cephalic by Leopold's   Neuro:  grossly normal   Skin:  no rash noted         SVE: 0 5/0/-3  FHT: Baseline: 130 bpm, Variability: Moderate 6 - 25 bpm, Accelerations: Reactive, Decelerations: Absent and Category 1  [place]: rare ctx, irritability     Prenatal Labs: I have personally reviewed pertinent reports  Blood Type: A-  Rubella:  immune  HBsAg: neg  RPR: neg  HIV: NR  1hr Glucola: 110  GBS: neg    Assessment/Plan     Assessment:  27 y o   female at 41w4d here for post-dates induction, newly diagnosed gestational hypertension on admission      Plan:  Admit to L+D  NST reactive, fetus category 1, for continuous monitoring  For preeclampsia evaluation  Induction with cytotec/blanc balloon  PCN not indicated  For pp Rh evaluation  Anticipate     Shoulder dystocia  --Discussed risk of shoulder dystocia, especially in setting of Leopold's EFW of 8 lbs  --Reviewed maneuvers used to resolve shoulder dystocia, possibility of moving support person out to better perform maneuvers  Did discuss possible emergent  section if maneuvers fail  --Discussed risk of shoulder dystocia, effect on baby's brain as not receiving oxygen throughout maneuvers, risk of permanent physical damage and brain damage    Reviewed significance of diagnosis of newly diagnosed gestational hypertension  Plan for preeclampsia evaluation with lab work  Reviewed concern for end organ damage, stroke, seizures with preeclampsia as well as role of blood pressure control and magnesium in preventing these events  Expressed understanding, questions answered to stated satisfaction  Also reviewed recovery maneuvers in case of fetal heart tracing abnormalities, including repositioning, fluid bolus, stopping contractions, hands and knees position, oxygen  Discussed that if these maneuvers do not help recover the heart rate possibility of STAT  section  Regarding elective  section we discussed the risks of anesthesia, infection, excessive bleeding, injury to adjacent structures (including bowel, bladder, and ureter), scarring, poor wound-healing  Also reviewed risk of fistula formation  We did review risk for uterine rupture, placenta accreta/percreta in future pregnancies and damage to surrounding structures  Reviewed potential need for life-saving hysterectomy and/or blood transfusion  Discussed risk of DVT, PE, sepsis, death  Also briefly reviewed operative vaginal delivery and the risks those can entail  She verbalized understanding

## 2020-09-02 NOTE — ANESTHESIA PREPROCEDURE EVALUATION
Procedure:  LABOR ANALGESIA    Relevant Problems   GYN   (+) High-risk pregnancy in third trimester      Other   (+) Former smoker      plt [id number]       Anesthesia Plan  ASA Score- 2     Anesthesia Type- epidural with ASA Monitors  Additional Monitors:   Airway Plan:     Comment: Labor Epidural, GA back up for   Risks and benefits discussed with patient, including post-dural puncture headache, bleeding/infection, neurological compromise  Patient not on blood thinners (other than ASA - per SHIVANI guidelines, safe to do epidural), no history of easy bleeding  She agrees to proceed          Plan Factors-    Induction- intravenous  Postoperative Plan-     Informed Consent- Anesthetic plan and risks discussed with patient  I personally reviewed this patient with the CRNA  Discussed and agreed on the Anesthesia Plan with the CRNA  Frankie Johnson

## 2020-09-02 NOTE — PLAN OF CARE
Problem: PAIN - ADULT  Goal: Verbalizes/displays adequate comfort level or baseline comfort level  Description: Interventions:  - Encourage patient to monitor pain and request assistance  - Assess pain using appropriate pain scale  - Administer analgesics based on type and severity of pain and evaluate response  - Implement non-pharmacological measures as appropriate and evaluate response  - Consider cultural and social influences on pain and pain management  - Notify physician/advanced practitioner if interventions unsuccessful or patient reports new pain  Outcome: Progressing     Problem: INFECTION - ADULT  Goal: Absence or prevention of progression during hospitalization  Description: INTERVENTIONS:  - Assess and monitor for signs and symptoms of infection  - Monitor lab/diagnostic results  - Monitor all insertion sites, i e  indwelling lines, tubes, and drains  - Monitor endotracheal if appropriate and nasal secretions for changes in amount and color  - [place] appropriate cooling/warming therapies per order  - Administer medications as ordered  - Instruct and encourage patient and family to use good hand hygiene technique  - Identify and instruct in appropriate isolation precautions for identified infection/condition  Outcome: Progressing  Goal: Absence of fever/infection during neutropenic period  Description: INTERVENTIONS:  - Monitor WBC    Outcome: Progressing     Problem: SAFETY ADULT  Goal: Patient will remain free of falls  Description: INTERVENTIONS:  - Assess patient frequently for physical needs  -  Identify cognitive and physical deficits and behaviors that affect risk of falls    -  [place] fall precautions as indicated by assessment   - Educate patient/family on patient safety including physical limitations  - Instruct patient to call for assistance with activity based on assessment  - Modify environment to reduce risk of injury  - Consider OT/PT consult to assist with strengthening/mobility  Outcome: Progressing  Goal: Maintain or return to baseline ADL function  Description: INTERVENTIONS:  -  Assess patient's ability to carry out ADLs; assess patient's baseline for ADL function and identify physical deficits which impact ability to perform ADLs (bathing, care of mouth/teeth, toileting, grooming, dressing, etc )  - Assess/evaluate cause of self-care deficits   - Assess range of motion  - Assess patient's mobility; develop plan if impaired  - Assess patient's need for assistive devices and provide as appropriate  - Encourage maximum independence but intervene and supervise when necessary  - Involve family in performance of ADLs  - Assess for home care needs following discharge   - Consider OT consult to assist with ADL evaluation and planning for discharge  - Provide patient education as appropriate  Outcome: Progressing  Goal: Maintain or return mobility status to optimal level  Description: INTERVENTIONS:  - Assess patient's baseline mobility status (ambulation, transfers, stairs, etc )    - Identify cognitive and physical deficits and behaviors that affect mobility  - Identify mobility aids required to assist with transfers and/or ambulation (gait belt, sit-to-stand, lift, walker, cane, etc )  - [place] fall precautions as indicated by assessment  - Record patient progress and toleration of activity level on Mobility SBAR; progress patient to next Phase/Stage  - Instruct patient to call for assistance with activity based on assessment  - Consider rehabilitation consult to assist with strengthening/weightbearing, etc   Outcome: Progressing     Problem: Knowledge Deficit  Goal: Patient/family/caregiver demonstrates understanding of disease process, treatment plan, medications, and discharge instructions  Description: Complete learning assessment and assess knowledge base    Interventions:  - Provide teaching at level of understanding  - Provide teaching via preferred learning methods  Outcome: Progressing  Goal: Verbalizes understanding of labor plan  Description: Assess patient/family/caregiver's baseline knowledge level and ability to understand information  Provide education via patient/family/caregiver's preferred learning method at appropriate level of understanding  1  Provide teaching at level of understanding  2  Provide teaching via preferred learning method(s)  Outcome: Progressing     Problem: DISCHARGE PLANNING  Goal: Discharge to home or other facility with appropriate resources  Description: INTERVENTIONS:  - Identify barriers to discharge w/patient and caregiver  - Arrange for needed discharge resources and transportation as appropriate  - Identify discharge learning needs (meds, wound care, etc )  - Arrange for interpretive services to assist at discharge as needed  - Refer to Case Management Department for coordinating discharge planning if the patient needs post-hospital services based on physician/advanced practitioner order or complex needs related to functional status, cognitive ability, or social support system  Outcome: Progressing     Problem: Labor & Delivery  Goal: Manages discomfort  Description: Assess and monitor for signs and symptoms of discomfort  Assess patient's pain level regularly and per hospital policy  Administer medications as ordered  Support use of nonpharmacological methods to help control pain such as distraction, imagery, relaxation, and application of heat and cold  Collaborate with interdisciplinary team and patient to determine appropriate pain management plan  1  Include patient in decisions related to comfort  2  Offer non-pharmacological pain management interventions  3  Report ineffective pain management to physician  Outcome: Progressing  Goal: Patient vital signs are stable  Description: 1  Assess vital signs - vaginal delivery    Outcome: Progressing     Problem: Potential for Falls  Goal: Patient will remain free of falls  Description: INTERVENTIONS:  - Assess patient frequently for physical needs  -  Identify cognitive and physical deficits and behaviors that affect risk of falls    -  [place] fall precautions as indicated by assessment   - Educate patient/family on patient safety including physical limitations  - Instruct patient to call for assistance with activity based on assessment  - Modify environment to reduce risk of injury  - Consider OT/PT consult to assist with strengthening/mobility  Outcome: Progressing

## 2020-09-02 NOTE — OB LABOR/OXYTOCIN SAFETY PROGRESS
Oxytocin Safety Progress Check Note - Day Scales 27 y o  female MRN: [id number]    Unit/Bed#: -01 Encounter: [id number]       Contraction Frequency (minutes): 5-6  Contraction Quality: Moderate  Tachysystole: No   Cervical Dilation: 1-2        Cervical Effacement: 70  Fetal Station: -3  Baseline Rate: 135 bpm  Fetal Heart Rate: 139 BPM  FHR Category: Category II(one variable post blanc, otherwise has been reactive and reassuring)               Vital Signs: mild range BP with now known gHTN  Vitals:    09/02/20 1029   BP: 143/80   Pulse: 82   Resp:    Temp:    SpO2:            Notes/comments: Feeling well  Blanc balloon placed  Tolerated well  S/p stadol with more minimal variability currently, scalp reaction with blanc  Will start pitocin when able          Matt Caruso MD 9/2/2020 10:37 AM

## 2020-09-02 NOTE — OB LABOR/OXYTOCIN SAFETY PROGRESS
Labor Progress Note - Nino Pelt 27 y o  female MRN: [id number]    Unit/Bed#: -01 Encounter: [id number]    Dose (hoang-units/min) Oxytocin: 14 hoang-units/min  Contraction Frequency (minutes): 3-5  Contraction Quality: Mild, Moderate  Tachysystole: No   Cervical Dilation: 4        Cervical Effacement: 70  Fetal Station: -3  Baseline Rate: 140 bpm  Fetal Heart Rate: 141 BPM  FHR Category: Category I               Vital Signs: normotensive  Vitals:    09/02/20 1329   BP: 125/74   Pulse: 75   Resp:    Temp:    SpO2:            Notes/comments: Feeling contractions more but had some pain relief when the blanc came out at 1350  Was at a 9 5/10 with balloon in, now more like 7/10  Cervix soft with some bloody show  4/70/-3 somewhat ballotable  Discussed options for epidural prior to AROM which patient would like to plan for, did review if still ballotable once comfortable may continue pitocin titration to get baby better applied, even with that patient is ready for epidural now  Will assess for AROM once comfortable  Cat I with gorgeous scalp stim with check         Tarun Yousif MD 9/2/2020 3:55 PM

## 2020-09-03 VITALS — HEART RATE: 114 BPM

## 2020-09-03 PROBLEM — Z98.891 STATUS POST PRIMARY LOW TRANSVERSE CESAREAN SECTION: Status: ACTIVE | Noted: 2020-09-03

## 2020-09-03 LAB
BASE EXCESS BLDCOA CALC-SCNC: -3.9 MMOL/L (ref 3–11)
BASE EXCESS BLDCOV CALC-SCNC: -3.4 MMOL/L (ref 1–9)
HCO3 BLDCOA-SCNC: 22.6 MMOL/L (ref 17.3–27.3)
HCO3 BLDCOV-SCNC: 22.1 MMOL/L (ref 12.2–28.6)
O2 CT VFR BLDCOA CALC: 2.8 ML/DL
OXYHGB MFR BLDCOA: 13.5 %
OXYHGB MFR BLDCOV: 28.1 %
PCO2 BLDCOA: 46.5 MM[HG] (ref 30–60)
PCO2 BLDCOV: 41.8 MM HG (ref 27–43)
PH BLDCOA: 7.3 [PH] (ref 7.23–7.43)
PH BLDCOV: 7.34 [PH] (ref 7.19–7.49)
PO2 BLDCOA: 11.1 MM HG (ref 5–25)
PO2 BLDCOV: 15.7 MM HG (ref 15–45)
SAO2 % BLDCOV: 5.8 ML/DL

## 2020-09-03 PROCEDURE — 99024 POSTOP FOLLOW-UP VISIT: CPT | Performed by: STUDENT IN AN ORGANIZED HEALTH CARE EDUCATION/TRAINING PROGRAM

## 2020-09-03 PROCEDURE — 82805 BLOOD GASES W/O2 SATURATION: CPT | Performed by: OBSTETRICS & GYNECOLOGY

## 2020-09-03 PROCEDURE — 88307 TISSUE EXAM BY PATHOLOGIST: CPT | Performed by: PATHOLOGY

## 2020-09-03 PROCEDURE — 94760 N-INVAS EAR/PLS OXIMETRY 1: CPT

## 2020-09-03 PROCEDURE — 94762 N-INVAS EAR/PLS OXIMTRY CONT: CPT

## 2020-09-03 PROCEDURE — 59510 CESAREAN DELIVERY: CPT | Performed by: OBSTETRICS & GYNECOLOGY

## 2020-09-03 RX ORDER — CALCIUM CARBONATE 200(500)MG
1000 TABLET,CHEWABLE ORAL DAILY PRN
Status: DISCONTINUED | OUTPATIENT
Start: 2020-09-03 | End: 2020-09-05 | Stop reason: HOSPADM

## 2020-09-03 RX ORDER — DIPHENHYDRAMINE HYDROCHLORIDE 50 MG/ML
25 INJECTION INTRAMUSCULAR; INTRAVENOUS EVERY 6 HOURS PRN
Status: DISCONTINUED | OUTPATIENT
Start: 2020-09-03 | End: 2020-09-05 | Stop reason: HOSPADM

## 2020-09-03 RX ORDER — ACETAMINOPHEN 325 MG/1
650 TABLET ORAL EVERY 4 HOURS PRN
Status: DISCONTINUED | OUTPATIENT
Start: 2020-09-03 | End: 2020-09-05

## 2020-09-03 RX ORDER — LIDOCAINE HYDROCHLORIDE AND EPINEPHRINE 15; 5 MG/ML; UG/ML
INJECTION, SOLUTION EPIDURAL AS NEEDED
Status: DISCONTINUED | OUTPATIENT
Start: 2020-09-03 | End: 2020-09-04

## 2020-09-03 RX ORDER — CEFAZOLIN SODIUM 2 G/50ML
2000 SOLUTION INTRAVENOUS ONCE
Status: COMPLETED | OUTPATIENT
Start: 2020-09-03 | End: 2020-09-03

## 2020-09-03 RX ORDER — OXYCODONE HYDROCHLORIDE 10 MG/1
10 TABLET ORAL EVERY 4 HOURS PRN
Status: DISCONTINUED | OUTPATIENT
Start: 2020-09-04 | End: 2020-09-05 | Stop reason: HOSPADM

## 2020-09-03 RX ORDER — ONDANSETRON 2 MG/ML
4 INJECTION INTRAMUSCULAR; INTRAVENOUS EVERY 8 HOURS PRN
Status: DISCONTINUED | OUTPATIENT
Start: 2020-09-03 | End: 2020-09-05 | Stop reason: HOSPADM

## 2020-09-03 RX ORDER — OXYTOCIN/RINGER'S LACTATE 30/500 ML
62.5 PLASTIC BAG, INJECTION (ML) INTRAVENOUS CONTINUOUS
Status: DISPENSED | OUTPATIENT
Start: 2020-09-03 | End: 2020-09-03

## 2020-09-03 RX ORDER — MORPHINE SULFATE 0.5 MG/ML
INJECTION, SOLUTION EPIDURAL; INTRATHECAL; INTRAVENOUS AS NEEDED
Status: DISCONTINUED | OUTPATIENT
Start: 2020-09-03 | End: 2020-09-04

## 2020-09-03 RX ORDER — IBUPROFEN 600 MG/1
600 TABLET ORAL EVERY 6 HOURS PRN
Status: DISCONTINUED | OUTPATIENT
Start: 2020-09-06 | End: 2020-09-04

## 2020-09-03 RX ORDER — HYDROMORPHONE HCL/PF 1 MG/ML
0.2 SYRINGE (ML) INJECTION
Status: DISCONTINUED | OUTPATIENT
Start: 2020-09-03 | End: 2020-09-05 | Stop reason: HOSPADM

## 2020-09-03 RX ORDER — NALOXONE HYDROCHLORIDE 0.4 MG/ML
0.1 INJECTION, SOLUTION INTRAMUSCULAR; INTRAVENOUS; SUBCUTANEOUS
Status: ACTIVE | OUTPATIENT
Start: 2020-09-03 | End: 2020-09-04

## 2020-09-03 RX ORDER — DOCUSATE SODIUM 100 MG/1
100 CAPSULE, LIQUID FILLED ORAL 2 TIMES DAILY
Status: DISCONTINUED | OUTPATIENT
Start: 2020-09-03 | End: 2020-09-05 | Stop reason: HOSPADM

## 2020-09-03 RX ORDER — HYDROMORPHONE HCL/PF 1 MG/ML
0.5 SYRINGE (ML) INJECTION
Status: DISCONTINUED | OUTPATIENT
Start: 2020-09-03 | End: 2020-09-05 | Stop reason: HOSPADM

## 2020-09-03 RX ORDER — ONDANSETRON 2 MG/ML
INJECTION INTRAMUSCULAR; INTRAVENOUS AS NEEDED
Status: DISCONTINUED | OUTPATIENT
Start: 2020-09-03 | End: 2020-09-04

## 2020-09-03 RX ORDER — KETOROLAC TROMETHAMINE 30 MG/ML
30 INJECTION, SOLUTION INTRAMUSCULAR; INTRAVENOUS EVERY 6 HOURS
Status: DISCONTINUED | OUTPATIENT
Start: 2020-09-03 | End: 2020-09-04

## 2020-09-03 RX ORDER — OXYCODONE HYDROCHLORIDE 5 MG/1
5 TABLET ORAL EVERY 4 HOURS PRN
Status: DISCONTINUED | OUTPATIENT
Start: 2020-09-04 | End: 2020-09-05 | Stop reason: HOSPADM

## 2020-09-03 RX ORDER — SODIUM CHLORIDE, SODIUM LACTATE, POTASSIUM CHLORIDE, CALCIUM CHLORIDE 600; 310; 30; 20 MG/100ML; MG/100ML; MG/100ML; MG/100ML
INJECTION, SOLUTION INTRAVENOUS CONTINUOUS PRN
Status: DISCONTINUED | OUTPATIENT
Start: 2020-09-03 | End: 2020-09-04

## 2020-09-03 RX ORDER — DEXAMETHASONE SODIUM PHOSPHATE 4 MG/ML
INJECTION, SOLUTION INTRA-ARTICULAR; INTRALESIONAL; INTRAMUSCULAR; INTRAVENOUS; SOFT TISSUE AS NEEDED
Status: DISCONTINUED | OUTPATIENT
Start: 2020-09-03 | End: 2020-09-04

## 2020-09-03 RX ORDER — SENNOSIDES 8.6 MG
1 TABLET ORAL DAILY
Status: DISCONTINUED | OUTPATIENT
Start: 2020-09-03 | End: 2020-09-05 | Stop reason: HOSPADM

## 2020-09-03 RX ORDER — DIAPER,BRIEF,INFANT-TODD,DISP
1 EACH MISCELLANEOUS 4 TIMES DAILY PRN
Status: DISCONTINUED | OUTPATIENT
Start: 2020-09-03 | End: 2020-09-05 | Stop reason: HOSPADM

## 2020-09-03 RX ORDER — PROPOFOL 10 MG/ML
INJECTION, EMULSION INTRAVENOUS AS NEEDED
Status: DISCONTINUED | OUTPATIENT
Start: 2020-09-03 | End: 2020-09-04

## 2020-09-03 RX ORDER — KETOROLAC TROMETHAMINE 30 MG/ML
INJECTION, SOLUTION INTRAMUSCULAR; INTRAVENOUS AS NEEDED
Status: DISCONTINUED | OUTPATIENT
Start: 2020-09-03 | End: 2020-09-04

## 2020-09-03 RX ADMIN — HYDROMORPHONE HYDROCHLORIDE 0.2 MG: 1 INJECTION, SOLUTION INTRAMUSCULAR; INTRAVENOUS; SUBCUTANEOUS at 07:41

## 2020-09-03 RX ADMIN — CEFAZOLIN SODIUM 2000 MG: 2 SOLUTION INTRAVENOUS at 05:41

## 2020-09-03 RX ADMIN — LIDOCAINE HYDROCHLORIDE,EPINEPHRINE BITARTRATE 5 ML: 15; .005 INJECTION, SOLUTION EPIDURAL; INFILTRATION; INTRACAUDAL; PERINEURAL at 05:41

## 2020-09-03 RX ADMIN — KETOROLAC TROMETHAMINE 30 MG: 30 INJECTION, SOLUTION INTRAMUSCULAR at 06:07

## 2020-09-03 RX ADMIN — SODIUM CHLORIDE, SODIUM LACTATE, POTASSIUM CHLORIDE, AND CALCIUM CHLORIDE 125 ML/HR: .6; .31; .03; .02 INJECTION, SOLUTION INTRAVENOUS at 07:00

## 2020-09-03 RX ADMIN — Medication 62.5 MILLI-UNITS/MIN: at 10:24

## 2020-09-03 RX ADMIN — ROPIVACAINE HYDROCHLORIDE: 2 INJECTION, SOLUTION EPIDURAL; INFILTRATION at 03:40

## 2020-09-03 RX ADMIN — HYDROMORPHONE HYDROCHLORIDE 0.5 MG: 1 INJECTION, SOLUTION INTRAMUSCULAR; INTRAVENOUS; SUBCUTANEOUS at 10:27

## 2020-09-03 RX ADMIN — KETOROLAC TROMETHAMINE 30 MG: 30 INJECTION, SOLUTION INTRAMUSCULAR at 13:29

## 2020-09-03 RX ADMIN — ONDANSETRON 4 MG: 2 INJECTION INTRAMUSCULAR; INTRAVENOUS at 05:45

## 2020-09-03 RX ADMIN — DOCUSATE SODIUM 100 MG: 100 CAPSULE ORAL at 18:28

## 2020-09-03 RX ADMIN — LIDOCAINE HYDROCHLORIDE,EPINEPHRINE BITARTRATE 5 ML: 15; .005 INJECTION, SOLUTION EPIDURAL; INFILTRATION; INTRACAUDAL; PERINEURAL at 05:39

## 2020-09-03 RX ADMIN — LIDOCAINE HYDROCHLORIDE,EPINEPHRINE BITARTRATE 5 ML: 15; .005 INJECTION, SOLUTION EPIDURAL; INFILTRATION; INTRACAUDAL; PERINEURAL at 05:47

## 2020-09-03 RX ADMIN — DEXAMETHASONE SODIUM PHOSPHATE 4 MG: 4 INJECTION, SOLUTION INTRAMUSCULAR; INTRAVENOUS at 05:45

## 2020-09-03 RX ADMIN — PROPOFOL 15 MG: 10 INJECTION, EMULSION INTRAVENOUS at 06:01

## 2020-09-03 RX ADMIN — ONDANSETRON 4 MG: 2 INJECTION INTRAMUSCULAR; INTRAVENOUS at 02:13

## 2020-09-03 RX ADMIN — SODIUM CHLORIDE, SODIUM LACTATE, POTASSIUM CHLORIDE, AND CALCIUM CHLORIDE: .6; .31; .03; .02 INJECTION, SOLUTION INTRAVENOUS at 05:38

## 2020-09-03 RX ADMIN — SODIUM CHLORIDE, SODIUM LACTATE, POTASSIUM CHLORIDE, AND CALCIUM CHLORIDE: .6; .31; .03; .02 INJECTION, SOLUTION INTRAVENOUS at 05:58

## 2020-09-03 RX ADMIN — MORPHINE SULFATE 3 MG: 0.5 INJECTION, SOLUTION EPIDURAL; INTRATHECAL; INTRAVENOUS at 06:07

## 2020-09-03 RX ADMIN — SODIUM CHLORIDE, SODIUM LACTATE, POTASSIUM CHLORIDE, AND CALCIUM CHLORIDE 1000 ML: .6; .31; .03; .02 INJECTION, SOLUTION INTRAVENOUS at 15:15

## 2020-09-03 RX ADMIN — HYDROMORPHONE HYDROCHLORIDE 0.2 MG: 1 INJECTION, SOLUTION INTRAMUSCULAR; INTRAVENOUS; SUBCUTANEOUS at 07:26

## 2020-09-03 RX ADMIN — SODIUM CHLORIDE, SODIUM LACTATE, POTASSIUM CHLORIDE, AND CALCIUM CHLORIDE 125 ML/HR: .6; .31; .03; .02 INJECTION, SOLUTION INTRAVENOUS at 03:02

## 2020-09-03 RX ADMIN — Medication 250 MILLI-UNITS/MIN: at 07:00

## 2020-09-03 RX ADMIN — KETOROLAC TROMETHAMINE 30 MG: 30 INJECTION, SOLUTION INTRAMUSCULAR at 19:27

## 2020-09-03 RX ADMIN — LIDOCAINE HYDROCHLORIDE,EPINEPHRINE BITARTRATE 5 ML: 15; .005 INJECTION, SOLUTION EPIDURAL; INFILTRATION; INTRACAUDAL; PERINEURAL at 05:43

## 2020-09-03 RX ADMIN — Medication 500 MG: at 05:44

## 2020-09-03 NOTE — DISCHARGE INSTRUCTIONS
Self Care After Delivery   AMBULATORY CARE:   The postpartum period  is the period of time from delivery to about 6 weeks  During this time you may experience many physical and emotional changes  It is important to understand what is normal and when you need to call your healthcare provider  It is also important to know how to care for yourself during this time  Call 911 for any of the following:   · You soak through 1 pad in 15 minutes, have blurry vision, clammy or pale skin, and feel faint  · You faint or lose consciousness  · Your heart is beating faster than normal      · You have trouble breathing  · You cough up blood  Seek care immediately if:   · You soak through 1 or more pads in an hour, or pass blood clots larger than a quarter from your vagina  · Your leg is painful, red, and larger than normal      · You have severe abdominal pain  · You have a bad headache or changes in your vision  · Your episiotomy or C section incision is red, swollen, bleeding, or draining pus  · Your incision comes apart  · You see or hear things that are not there, or have thoughts of harming yourself or your baby  Contact your obstetrician or midwife if:   · You have a fever  · You have new or worsening pain in your abdomen or vagina  · You continue to have the baby blues 10 days after you deliver  · You have trouble sleeping  · You have foul-smelling discharge from your vagina  · You have pain or burning when you urinate  · You do not have a bowel movement for 3 days or more  · You have nausea or are vomiting  · You have hard lumps or red streaks over your breasts  · You have cracked nipples or bleed from your nipples  · You have questions or concerns about your condition or care  Physical changes:   The following are normal changes after you give birth:  · Pain in the area between your anus and vagina    · Breast pain    · Constipation or hemorrhoids    · Hot or cold flashes    · Vaginal bleeding or discharge    · Mild to moderate abdominal cramping    · Difficulty controlling bowel movements or urine  Emotional changes: The following are symptoms of the baby blues, or normal emotions after you give birth  The changes in your emotions may be caused by a drop in hormone levels after you deliver  If these symptoms last longer than 1 to 2 weeks after you give birth, you may have postpartum depression  · Feeling irritable    · Feeling sad    · Crying for no reason    · Feeling anxious  Breast care for nursing mothers: You may have sore breasts for 3 to 6 days after you give birth  This happens as your milk begins to fill your breasts  You may also have sore breasts if you do not breastfeed frequently  Do the following to care for your breasts:  · Apply a moist, warm, compress to your breast as directed  This may help soothe your breasts  Make sure the washcloth is not too hot before you apply it to your breast      · Nurse your baby or pump your milk frequently  This may prevent clogged milk ducts  Ask your healthcare provider how often to nurse or pump  · Massage your breasts as directed  This may help increase your milk flow  Gently rub your breasts in a circular motion before you breastfeed  You may need to gently squeeze your breast or nipple to help release milk  You can also use a breast pump to help release milk from your breast      · Wash your breasts with warm water only  Do not put soap on your nipples  Soap may cause your nipples to become dry  · Apply lanolin cream to your nipples as directed  Lanolin cream may add moisture to your skin and prevent nipple dryness  Always  wash off lanolin cream with warm water before you breastfeed  · Place pads in your bra  Your nipples may leak milk when you are not breastfeeding  You can place pads inside of your bra to help prevent leaking onto your clothing   Ask your healthcare provider where to purchase bra pads  · Get breastfeeding support if needed  There are healthcare providers who can answer questions about breastfeeding and provide you with support  Ask your healthcare provider who you can contact if you need breastfeeding support  Breast care for non-nursing mothers:  Milk will fill your breasts even if you bottle feed your baby  Do the following to help stop your milk from filling your breasts and causing pain:  · Wear a bra with support at all times  A sports bra or a tight-fitting bra will help stop your milk from coming in  · Apply ice on each breast for 15 to 20 minutes every hour or as directed  Use an ice pack, or put crushed ice in a plastic bag  Cover it with a towel  Ice helps your milk ducts shrink  · Keep your breasts away from warm water  Warm water will make it easier for milk to fill your breasts  Stand with your breasts away from warm water in the shower  · Limit how much you touch your breasts  This will prevent them from filling with milk  Perineum care: Your perineum is the area between your rectum and vagina  It is normal to have swelling and pain in this area after you give birth  If you had an episiotomy, your healthcare provider may give you special instructions  · Clean your perineum after you use the bathroom  This may prevent infection and help with healing  Use a spray bottle with warm water to clean your perineum  You may also gently spray warm water against your perineum when you urinate  Always wipe front to back  · Take a sitz bath as directed  A sitz bath may help relieve swelling and pain  Fill your bath tub or bucket with water up to your hips and sit in the water  Use cold water for 2 days after you deliver  Then use warm water  Ask your healthcare provider for more information about a sitz bath  · Apply ice packs for the first 24 hours or as directed  Use a plastic glove filled with ice or buy an ice pack   Wrap the ice pack or plastic glove in a small towel or wash cloth  Place the ice pack on your perineum for 20 minutes at a time  · Sit on a donut-shaped pillow  This may relieve pressure on your perineum when you sit  · Use wipes with medicine or take pills as directed  Your healthcare provider may tell you to use witch hazel pads  You can place witch hazel pads in the refrigerator before you apply them to your perineum  He may also tell you to take NSAIDs  Ask your healthcare provider how often to take pills or use wipes with medicine  · Do not go swimming or take tub baths for 4 to 6 weeks or as directed  This will help prevent an infection in your vagina or uterus  Bowel and bladder care: It may take 3 to 5 days to have a bowel movement after you deliver your baby  You can do the following to prevent or manage constipation, and get control of your bowel or bladder:  · Take stool softeners as directed  A stool softener is medicine that will make your bowel movements softer  This may prevent or relieve constipation  A stool softener may also make bowel movements less painful  · Drink plenty of liquids  Ask how much liquid to drink each day and which liquids are best for you  Liquids may help prevent constipation  · Eat foods high in fiber  Examples include fruits, vegetables, grains, beans, and lentils  Ask your healthcare provider how much fiber you need each day  Fiber may prevent constipation  · Do Kegel exercises as directed  Kegel exercises will help strengthen the muscles that control bowel movements and urination  Ask your healthcare provider for more information on Kegel exercises  · Apply cold compresses or medicine to hemorrhoids as directed  This may relieve swelling and pain  Your healthcare provider may tell you to apply ice or wipes with medicine to your hemorrhoids  He may also tell you to use a sitz bath   Ask your healthcare for more information on how to manage hemorrhoids  Nutrition:  Good nutrition is important in the postpartum period  It will help you return to a healthy weight, increase your energy levels, and prevent constipation  It will also help you get enough nutrients and calories if you are going to breastfeed your baby  · Eat a variety of healthy foods  Healthy foods include fruits, vegetables, whole-grain breads, low-fat dairy products, beans, lean meats, and fish  You may need 500 to 700 additional calories each day if you breastfeed your baby  You may also need extra protein  · Limit foods with added sugar and high amounts of fat  These foods are high in calories and low in healthy nutrients  Read food labels so you know how much sugar and fat is in the food you want to eat  · Drink 8 to 10 glasses of water per day  Water will help you make plenty of milk for your baby  It will also help prevent constipation  Drink a glass of water every time you breastfeed your baby  · Take vitamins as directed  Ask your healthcare provider what vitamins you need  · Limit caffeine and alcohol if you are breastfeeding  Caffeine and alcohol can get into your breast milk  Caffeine and alcohol can make your baby fussy  They can also interfere with your baby's sleep  Ask your healthcare provider if you can drink alcohol or caffeine  Rest and sleep: You may feel very tired in the postpartum period  Enough sleep will help you heal and give you energy to care for your baby  The following may help you get sleep and rest:  · Nap when your baby naps  Your baby may nap several times during the day  Get rest during this time  · Limit visitors  Many people may want to see you and your baby  Ask friends or family to visit on different days  This will give you time to rest      · Do not plan too much for one day  Put off household chores so that you have time to rest  Gradually do more each day  · Ask for help from family, friends, or neighbors    Ask them to help you with laundry, cleaning, or errands  Also ask someone to watch the baby while you take a nap or relax  Ask your partner to help with the care of your baby  Pump some of your breast milk so your partner can feed your baby during the night  Exercise after delivery:  Wait until your healthcare provider says it is okay to exercise  Exercise can help you lose weight, increase your energy levels, and manage your mood  It can also prevent constipation and blood clots  Start with gentle exercises such as walking  Do more as you have more energy  You may need to avoid abdominal exercises for 1 to 2 weeks after you deliver  Talk to your healthcare provider about an exercise plan that is right for you  Sexual activity after delivery:   · Do not have sex until your healthcare provider says it is okay  You may need to wait 4 to 6 weeks before you have sex  This may prevent infection and allow time to heal      · Your menstrual cycle may begin as soon as 3 weeks after you deliver  Your period may be delayed if you breastfeed your baby  You can become pregnant before you get your first postpartum period  Talk to your healthcare provider about birth control that is right for you  Some types of birth control are not safe during breastfeeding  For support and more information:  Join a support group for new mothers  Ask for help from family and friends with chores, errands, and care of your baby  · Office of Women's Health,  Department of Health and Human Services  [address], [id number] [place] [address]  [address], [address] [place]  [place] , [address]  Phone: [phone]- [phone]  Web Address: www womenshealth gov  · [facility] Postpartum [address] [facility] , [address] [place] [address]  [address]  Web Address: [employer]  [email or web address]-care  aspx  Follow up with your obstetrician or midwife as directed:   You will need to follow up with your healthcare provider in 2 to 6 weeks after delivery  Write down your questions so you remember to ask them at your visits  © 2017 [facility] Information is for End User's use only and may not be sold, redistributed or otherwise used for commercial purposes  All illustrations and images included in CareNotes® are the copyrighted property of A D A M , Inc  or [employer] Brewer  The above information is an  only  It is not intended as medical advice for individual conditions or treatments  Talk to your doctor, nurse or pharmacist before following any medical regimen to see if it is safe and effective for you

## 2020-09-03 NOTE — OP NOTE
OPERATIVE REPORT  PATIENT NAME: Tiffani Retana    :  1990  MRN: [id number]  Pt Location: [facility] L&D OR ROOM 01    SURGERY DATE: 9/3/2020    Surgeon(s) and Role:     * Venu Ndiaye MD - Primary     * Jerome Day MD - Assisting    Preop Diagnosis:  Category II FHT with recurrent late decelerations and tachycardia  IUP at 40w4d  Gestational HTN  Maternal Obesity  Rh negative  Anxiety    Post-Op Diagnosis Codes:  Same as above    Procedure(s) (LRB):   SECTION () (N/A)    Specimen(s):  ID Type Source Tests Collected by Time Destination   1 :  Tissue (Placenta on Hold) OB Only Placenta TISSUE EXAM OB (PLACENTA) ONLY Venu Ndiaye MD 9/3/2020 1398    A :  Cord Blood Cord BLOOD GAS, VENOUS, CORD, BLOOD GAS, ARTERIAL, CORD Venu Ndiaye MD 9/3/2020 3256        Quantitative Blood Loss:   540mL    Drains:  Urethral Catheter Non-latex 14 Fr  (Active)   Reasons to continue Urinary Catheter  Post-operative urological requirements 20 0542   Goal for Removal Remove POD#1 20 0542   Site Assessment Clean;Skin intact 20 0542   Collection Container Standard drainage bag 20 0542   Securement Method Securing device (Describe) 20 0542   Output (mL) 215 mL 20 0400   Number of days: 1       Anesthesia Type:   Epidural    Operative Indications:  Category II FHT with recurrent late decelerations and tachycardia  IUP at 40w4d    Operative Findings:  1  Delivery of viable female on 20 at 0552, weight 7lbs 11oz; Apgar scores of 8 at one minute and 9 at five minutes  2  Normal appearing placenta with centrally-inserted 3 vessel cord  3  Clear amniotic fluid, foul smelling  4  Grossly normal uterus, tubes, and ovaries    Specimens:  1  Arterial and venous cord gases  Arterial pH: 7 305, Base excess: -3 9  Venous pH: 7 342, Base excess: -3 4  2  Cord blood  3  Segment of umbilical cord  4   Placenta to pathology     Brief Hospital Course:   Tiffani Retana is a 27 y arlene Mehta at 40w4d wks who was initially admitted on 20 for Induction of labor  She was noted to have elevated Bps on admission and ultimately met criteria for Gestational HTN  Pre-eclampsia labs were drawn and WNL  She received cytotec and blanc balloon for cervical ripening followed by induction with pitocin at 1126 on 20  Amniotomy was performed at  for clear fluid  Pt received an epidural during her labor course for anesthesia  Pt progressed to complete dilation without complication at 6114  After pushing for approximately 30mins, FHT with recurrent late decelerations and fetal tachycardia despite resuscitative measures  Fetal station not conducive to operative delivery, therefore primary  section recommended  Procedure Details  Decision was made to proceed with  section due to persistent category II FHT with recurrent late decelerations and fetal tachycardia during second stage of labor  Patient was made aware of these findings and the proposed plan  Risks, benefits, possible complications, alternate treatment options, and expected outcomes were discussed with the patient  The patient agreed with the proposed plan and gave informed consent  The patient was taken to the operating room where she was properly identified to the OR staff and attending physician  She had already received epidural anesthesia during her labor course, which was augmented appropriately by Dr Chivo Green preoperatively  Patient was placed in dorsal supine position with left lateral tilt of hips  Fetal heart tones were appreciated and found to be appropriate  A Blanc catheter was already in place  SCDs were then placed  The vagina was prepped with Betadine  The abdomen was prepped with Chloraprep and following appropriate drying time, the patient was draped in the usual sterile manner for a Pfannenstiel incision  The patient had received Ancef 2g and Azithromycin 500mg IV pre-operatively for prophylaxis  A Time Out was held and the above information confirmed  The patient was identified as Heath De Los Santos and the procedure verified as  Delivery  A Pfannenstiel incision was made and carried down through the underlying subcutaneous tissue to the fascia using a scalpel  Rectus fascia was then incised in the midline and extended laterally using Fajardo scissors  The superior edge of the fascia was grasped with Kocher clamps, tented upward, and the underlying muscle was bluntly dissected off  The inferior edge was grasped with Kocher clamps and cleared in similar fashion with Fajardo scissors  All anatomic layers were well-demarcated  The rectus muscles were  and the peritoneum was identified and subsequently entered and extended longitudinally with blunt dissection  The bladder blade was inserted  A low transverse uterine incision was made with the scalpel and extended laterally with blunt dissection  The amnion was entered bluntly with the findings noted above  The Surgeon's hand was inserted through the hysterotomy and the fetal head was palpated, deep in the pelvis  A , using a sterile glove, placed a hand in the vagina and assisted with elevating the fetal head cephalad  The surgeon was then able to elevate and deliver the fetal head through the uterine incision with the assistance of fundal pressure  The  had spontaneous cry with good color and tone  The umbilical cord was clamped and cut after a delay of 30 seconds  The infant was handed off to the  providers  Arterial and venous cord gases, cord blood, and a segment of umbilical cord were obtained for evaluation and promptly sent to the lab  The placenta delivered spontaneously with uterine fundal massage and was noted to have a  3-vessel cord  This was also sent to the lab for placental pathology  The uterus was exteriorized and a moist lap sponge was used to clear the cavity of clots and products of conception  The uterine incision was closed with a running locked suture of 0 Monocryl  A second layer of the same suture was used to imbricate the first  Bleeding was noted in the midline and this was made hemostatic with a single figure-of-eight suture of 0-Monocryl  Good hemostasis was confirmed upon uterine closure  The posterior cul-de-sac was cleared of all clots and debris with suction  The uterus was then returned to the abdomen  The paracolic gutters were inspected and noted to be clear of clots and debris  The uterine incision was then re-inspected  Surgicel was placed for additional hemostasis  The rectus muscles and fascia were inspected and noted to be hemostatic  The fascia was then closed with a running suture of 0 Vicryl  The subcutaneous tissues were suction irrigated with warmed normal saline  The skin was closed with 4-0 vicryl in a subcuticular fashion on a Agustín needle  Exofin glue was applied  At the conclusion of the procedure, all needle, sponge, and instrument counts were noted to be correct x2  The patient tolerated the procedure well and was transferred to her the recovery room in stable condition  Rissa Padron MD was present and participated in all key portions of the case      Complications:   None    Patient Disposition:  PACU  and hemodynamically stable    SIGNATURE: Brenton Torres MD  DATE: September 3, 2020  TIME: 6:29 AM

## 2020-09-03 NOTE — OB LABOR/OXYTOCIN SAFETY PROGRESS
Oxytocin Safety Progress Check Note - Casie Lopez 27 y o  female MRN: [id number]    Unit/Bed#: -01 Encounter: [id number]    Dose (hoang-units/min) Oxytocin: 14 hoang-units/min  Contraction Frequency (minutes): 1 5-6 5  Contraction Quality: Moderate  Tachysystole: No   Cervical Dilation: 4-5        Cervical Effacement: 90  Fetal Station: -2  Baseline Rate: 135 bpm  Fetal Heart Rate: 138 BPM  FHR Category: Category I  Oxytocin Safety Progress Check: Safety check completed            Vital Signs: reviewed  Vitals:    09/02/20 1954   BP: 133/75   Pulse: 67   Resp: 18   Temp: 97 6 °F (36 4 °C)   SpO2:            Notes/comments: patient comfortable after epidural  Small cervical change noted  AROM for blood tinged fluid at 2024  Will continue pitocin titration and reassess in 2 hrs  Dr Edna Hernandez aware         Luz Horvath MD 9/2/2020 8:33 PM

## 2020-09-03 NOTE — QUICK NOTE
Progress Note - OB/GYN  Shira Villa 27 y o  female MRN: [id number]  Unit/Bed#: -01 Encounter: [id number]      Subjective:  Shira Villa is post op from a primary LTCS for Cat II tracing  She is doing well  Her pain is well controlled  Vitally stable  Denies CP, SOB, calf pain  She has been afebrile since delivery  Pain: non  Tolerating PO: yes  Voiding: blanc in place  Flatus: yes  BM: no  Ambulating: no  Breastfeeding:  yes  Chest pain: no  Shortness of breath: no  Leg pain: no  Lochia: minimal    Vitals:   /66 (BP Location: Left arm)   Pulse 92   Temp 98 5 °F (36 9 °C) (Oral)   Resp 18   Ht 5' 4" (1 626 m)   Wt 104 kg (230 lb)   LMP 11/24/2019 (Exact Date)   SpO2 94%   Breastfeeding Yes   BMI 39 48 kg/m²       Intake/Output Summary (Last 24 hours) at 9/3/2020 1520  Last data filed at 9/3/2020 1513  Gross per 24 hour   Intake 7708 33 ml   Output 2170 ml   Net 5538 33 ml       Invasive Devices     Peripheral Intravenous Line            Peripheral IV 09/03/20 Right Hand less than 1 day          Drain            Urethral Catheter Non-latex 14 Fr  less than 1 day                Physical Exam:   GEN: Shira Villa appears well, alert and oriented x 3, pleasant and cooperative   ABDOMEN: soft, no tenderness, no distention, fundus 2cm above umbilicus, Incision C/D/I  EXTREMITIES: SCDs on      Labs:   Admission on 09/01/2020   Component Date Value    ABO Grouping 09/01/2020 A     Rh Factor 09/01/2020 Negative     Antibody Screen 09/01/2020 Positive     Specimen Expiration Date 09/01/2020 [id number]     WBC 09/01/2020 12 33*    RBC 09/01/2020 3 53*    Hemoglobin 09/01/2020 10 9*    Hematocrit 09/01/2020 32 5*    MCV 09/01/2020 92     MCH 09/01/2020 30 9     MCHC 09/01/2020 33 5     RDW 09/01/2020 12 8     Platelets 69/06/1031 299     MPV 09/01/2020 10 3     RPR 09/01/2020 Non-Reactive     Extra Tube 09/01/2020 hold for add ons     ANTIBODY ID   #1 09/01/2020 Passive D Antibody, Patient Received RHIG     Sodium 2020 135*    Potassium 2020 3 7     Chloride 2020 104     CO2 2020 19*    ANION GAP 2020 12     BUN 2020 11     Creatinine 2020 0 51*    Glucose 2020 76     Calcium 2020 10 2*    AST 2020 17     ALT 2020 26     Alkaline Phosphatase 2020 114     Total Protein 2020 6 6     Albumin 2020 2 7*    Total Bilirubin 2020 0 64     eGFR 2020 129     Creatinine, Ur 2020 30 0     Protein Urine Random 2020 <6     Prot/Creat Ratio, Ur 2020 <0 20*    pH, Cord Spencer 2020 7  342     pCO2, Cord Spencer 2020 41 8     pO2, Cord Spencer 2020 15 7     HCO3, Cord Spencer 2020 22 1    [facility] Exc, Cord Spencer 2020 -3 4*    O2 Cont, Cord Spencer 2020 5 8     O2 HGB,VENOUS CORD 2020 28 1     pH, Cord Art 2020 7 305     pCO2, Cord Art 2020 46 5     pO2, Cord Art 2020 11 1     HCO3, Cord Art 2020 22 6     Base Exc, Cord Art 2020 -3 9*    O2 Content, Cord Art 2020 2 8     O2 Hgb, Arterial Cord 2020 13 5          Patient Active Problem List   Diagnosis    Rh negative state in antepartum period    Former smoker    H/O LEEP (loop electrosurgical excision procedure) of cervix complicating pregnancy, first trimester    Maternal obesity, antepartum, third trimester    High-risk pregnancy in third trimester    39 weeks gestation of pregnancy    Gestational hypertension, third trimester    40 weeks gestation of pregnancy    Status post primary low transverse  section        Assessment:  Postop Day #0 s/p1LTCS, stable    Plan:     Postoperative care   Hgb 10 9g/dL --> f/u AM CBC    blanc in place   Vitals stable, afebrile, no CP/SOB/calf tenderness      Shalini Otto MD  9/3/2020  3:20 PM

## 2020-09-03 NOTE — OB LABOR/OXYTOCIN SAFETY PROGRESS
Oxytocin Safety Progress Check Note - Shira Villa 27 y o  female MRN: [id number]    Unit/Bed#: -01 Encounter: [id number]    Dose (hoang-units/min) Oxytocin: 24 hoang-units/min  Contraction Frequency (minutes): 2-3  Contraction Quality: Moderate  Tachysystole: No   Cervical Dilation: 5        Cervical Effacement: 80  Fetal Station: -2  Baseline Rate: 135 bpm  Fetal Heart Rate: 135 BPM  FHR Category: Category I  Oxytocin Safety Progress Check: Safety check completed            Vital Signs:   Vitals:    09/03/20 0124   BP: 129/66   Pulse: 78   Resp:    Temp:    SpO2:            Notes/comments:    Pt comfortable with epidural  SVE noted as above  Pitocin at 24milliunits/min  MVUs adequate, will continue current management       Janett Talbot MD 9/3/2020 1:51 AM

## 2020-09-03 NOTE — PLAN OF CARE
Problem: PAIN - ADULT  Goal: Verbalizes/displays adequate comfort level or baseline comfort level  Description: Interventions:  - Encourage patient to monitor pain and request assistance  - Assess pain using appropriate pain scale  - Administer analgesics based on type and severity of pain and evaluate response  - Implement non-pharmacological measures as appropriate and evaluate response  - Consider cultural and social influences on pain and pain management  - Notify physician/advanced practitioner if interventions unsuccessful or patient reports new pain  Outcome: Progressing     Problem: INFECTION - ADULT  Goal: Absence or prevention of progression during hospitalization  Description: INTERVENTIONS:  - Assess and monitor for signs and symptoms of infection  - Monitor lab/diagnostic results  - Monitor all insertion sites, i e  indwelling lines, tubes, and drains  - Monitor endotracheal if appropriate and nasal secretions for changes in amount and color  - [place] appropriate cooling/warming therapies per order  - Administer medications as ordered  - Instruct and encourage patient and family to use good hand hygiene technique  - Identify and instruct in appropriate isolation precautions for identified infection/condition  Outcome: Progressing  Goal: Absence of fever/infection during neutropenic period  Description: INTERVENTIONS:  - Monitor WBC    Outcome: Progressing     Problem: SAFETY ADULT  Goal: Patient will remain free of falls  Description: INTERVENTIONS:  - Assess patient frequently for physical needs  -  Identify cognitive and physical deficits and behaviors that affect risk of falls    -  [place] fall precautions as indicated by assessment   - Educate patient/family on patient safety including physical limitations  - Instruct patient to call for assistance with activity based on assessment  - Modify environment to reduce risk of injury  - Consider OT/PT consult to assist with strengthening/mobility  Outcome: Progressing  Goal: Maintain or return to baseline ADL function  Description: INTERVENTIONS:  -  Assess patient's ability to carry out ADLs; assess patient's baseline for ADL function and identify physical deficits which impact ability to perform ADLs (bathing, care of mouth/teeth, toileting, grooming, dressing, etc )  - Assess/evaluate cause of self-care deficits   - Assess range of motion  - Assess patient's mobility; develop plan if impaired  - Assess patient's need for assistive devices and provide as appropriate  - Encourage maximum independence but intervene and supervise when necessary  - Involve family in performance of ADLs  - Assess for home care needs following discharge   - Consider OT consult to assist with ADL evaluation and planning for discharge  - Provide patient education as appropriate  Outcome: Progressing  Goal: Maintain or return mobility status to optimal level  Description: INTERVENTIONS:  - Assess patient's baseline mobility status (ambulation, transfers, stairs, etc )    - Identify cognitive and physical deficits and behaviors that affect mobility  - Identify mobility aids required to assist with transfers and/or ambulation (gait belt, sit-to-stand, lift, walker, cane, etc )  - [place] fall precautions as indicated by assessment  - Record patient progress and toleration of activity level on Mobility SBAR; progress patient to next Phase/Stage  - Instruct patient to call for assistance with activity based on assessment  - Consider rehabilitation consult to assist with strengthening/weightbearing, etc   Outcome: Progressing     Problem: DISCHARGE PLANNING  Goal: Discharge to home or other facility with appropriate resources  Description: INTERVENTIONS:  - Identify barriers to discharge w/patient and caregiver  - Arrange for needed discharge resources and transportation as appropriate  - Identify discharge learning needs (meds, wound care, etc )  - Arrange for interpretive services to assist at discharge as needed  - Refer to Case Management Department for coordinating discharge planning if the patient needs post-hospital services based on physician/advanced practitioner order or complex needs related to functional status, cognitive ability, or social support system  Outcome: Progressing     Problem: Potential for Falls  Goal: Patient will remain free of falls  Description: INTERVENTIONS:  - Assess patient frequently for physical needs  -  Identify cognitive and physical deficits and behaviors that affect risk of falls    -  [place] fall precautions as indicated by assessment   - Educate patient/family on patient safety including physical limitations  - Instruct patient to call for assistance with activity based on assessment  - Modify environment to reduce risk of injury  - Consider OT/PT consult to assist with strengthening/mobility  Outcome: Progressing     Problem: POSTPARTUM  Goal: Experiences normal postpartum course  Description: INTERVENTIONS:  - Monitor maternal vital signs  - Assess uterine involution and lochia  Outcome: Progressing  Goal: Appropriate maternal -  bonding  Description: INTERVENTIONS:  - Identify family support  - Assess for appropriate maternal/infant bonding   -Encourage maternal/infant bonding opportunities  - Referral to  or  as needed  Outcome: Progressing  Goal: Establishment of infant feeding pattern  Description: INTERVENTIONS:  - Assess breast/bottle feeding  - Refer to lactation as needed  Outcome: Progressing  Goal: Incision(s), wounds(s) or drain site(s) healing without S/S of infection  Description: INTERVENTIONS  - Assess and document risk factors for skin impairment   - Assess and document dressing, incision, wound bed, drain sites and surrounding tissue  - Consider nutrition services referral as needed  - Oral mucous membranes remain intact  - Provide patient/ family education  Outcome: Progressing

## 2020-09-03 NOTE — LACTATION NOTE
This note was copied from a baby's chart  CONSULT - LACTATION  Baby Girl Madan Boy) [place] 0 days female MRN: [id number]    [address] [facility] Room / Bed: (N)/(N) Encounter: [id number]    Maternal Information     MOTHER:  Anuja Candelario  Maternal Age: 27 y o    OB History: # 1 - Date: , Sex: None, Weight: None, GA: 6w0d, Delivery: None, Apgar1: None, Apgar5: None, Living: None, Birth Comments: None    # 2 - Date: 20, Sex: Female, Weight: 3495 g (7 lb 11 3 oz), GA: 40w4d, Delivery: , Low Transverse, Apgar1: 8, Apgar5: 9, Living: Living, Birth Comments: None   Previouse breast reduction surgery? No    Lactation history:   Has patient previously breast fed: No   How long had patient previously breast fed:     Previous breast feeding complications:       Past Surgical History:   Procedure Laterality Date    CERVICAL BIOPSY  W/ LOOP ELECTRODE EXCISION      WISDOM TOOTH EXTRACTION  2017        Birth information:  YOB: 2020   Time of birth: 5:52 AM   Sex: female   Delivery type: , Low Transverse   Birth Weight: 3495 g (7 lb 11 3 oz)   Percent of Weight Change: 0%     Gestational Age: 36w2d   [unfilled]    No clinical assessment completed  Feeding recommendations:  Nia Albright states she wants to exclusively breastfeed  Nia Albright has a Spectra 2  Set up Anuja with pump  Encouraged hand expression prior and after pumping sessions  Encouraged pumping sessions every 2 to 3 hours  Encouraged paced bottle feeding as Nia Albright states she already fed Adelyine artificial supplementation from a bottle  Met with mother  Provided mother with Ready, Set, Baby booklet  Discussed Skin to Skin contact an benefits to mom and baby  Talked about the delay of the first bath until baby has adjusted  Spoke about the benefits of rooming in  Feeding on cue and what that means for recognizing infant's hunger   Avoidance of pacifiers for the first month discussed  Talked about exclusive breastfeeding for the first 6 months  Positioning and latch reviewed as well as showing images of other feeding positions  Discussed the properties of a good latch in any position  Reviewed hand/manual expression  Discussed s/s that baby is getting enough milk and some s/s that breastfeeding dyad may need further help  Gave information on common concerns, what to expect the first few weeks after delivery, preparing for other caregivers, and how partners can help  Resources for support also provided  Instructions given on pumping  Discussed when to start, frequency, different pumps available versus manual expression  Discussed hygiene of hands and supplies as well as assembly, placement of flanges, size of flanged, preparing the breast and cycles and suction settings on pump  Demonstrated use of hand pump  Discussed labeling of milk, storage, and preparation of stored milk  Information on hand expression given  Discussed benefits of knowing how to manually express breast including stimulating milk supply, softening nipple for latch and evacuating breast in the event of engorgement  Encouraged parents to call for assistance, questions, and concerns about breastfeeding  Extension provided       [place], [place] 9/3/2020 1:23 PM

## 2020-09-03 NOTE — OB LABOR/OXYTOCIN SAFETY PROGRESS
Oxytocin Safety Progress Check Note - Ana Musa 27 y o  female MRN: [id number]    Unit/Bed#: -01 Encounter: [id number]    Dose (hoang-units/min) Oxytocin: 0 hoang-units/min  Contraction Frequency (minutes): 2-4  Contraction Quality: Strong  Tachysystole: No   Cervical Dilation: 10  Dilation Complete Date: 09/03/20  Dilation Complete Time: 0353  Cervical Effacement: 100  Fetal Station: 0  Baseline Rate: 155 bpm  Fetal Heart Rate: 162 BPM  FHR Category: Category II  Oxytocin Safety Progress Check: Safety check completed            Vital Signs:   Vitals:    09/03/20 0500   BP:    Pulse:    Resp: 20   Temp: 98 1 °F (36 7 °C)   SpO2:            Notes/comments:    Patient pushing for about 1 hour at this time  Minimal descent to 0/+1 with now fetal tachycardia and recurrent lates, even when pushing on either side  Recommend 1LTCS  Patient and partner in agreement with plan  All questions answered        Gerber Cisse MD 9/3/2020 5:24 AM

## 2020-09-03 NOTE — DISCHARGE SUMMARY
Discharge Summary - Day Scales 27 y o  female MRN: [id number]    Unit/Bed#: -01 Encounter: [id number]    Admission Date: 2020     Discharge Date: 20      Admitting Diagnosis:   1  Pregnancy at 40w4d  2  Gestational HTN  3  Maternal obesity  4  Rh negative  5  Hx of LEEP  6  Anxiety  7  Former smoker    Discharge Diagnosis: same, delivered    Procedures: primary  section, low transverse incision    Delivering Attending: Pete Richardson MD  Discharging Attending: Paul Feliz MD    Hospital Course:     Day Scales is a 27 y o  Corey Bessie at 40w4d wks who was initially admitted on 20 for Induction of labor  She was noted to have elevated Bps on admission and ultimately met criteria for Gestational HTN  Pre-eclampsia labs were drawn and WNL  She received cytotec and blanc balloon for cervical ripening followed by induction with pitocin at 1126 on 20  Amniotomy was performed at  for clear fluid  Pt received an epidural during her labor course for anesthesia  Pt progressed to complete dilation without complication at 0964  After pushing for approximately 30mins, FHT with recurrent late decelerations and fetal tachycardia despite resuscitative measures  Fetal station not conducive to operative delivery, therefore primary  section recommended  Pt agreeable  She delivered a viable female  on 9/3/20 at [phone]  Weight 7lbs 11oz via primary  section, low transverse incision  Apgars were 8 (1 min) and 9 (5 min)   was transferred to  nursery  Patient tolerated the procedure well and was transferred to recovery in stable condition  Her post-operative course was uncomplicated  Pre-delivery hemoglobin was 10 9, postoperative was 8 8  Her postoperative pain was well controlled with oral analgesics  She received RhoGAM for Rh negative status  On day of discharge, she was ambulating and able to reasonably perform all ADLs   She was voiding and had appropriate bowel function  Pain was well controlled  She was discharged home on post-operative day #2 without complications  Patient was instructed to follow up with her OB as an outpatient and was given appropriate warnings to call provider if she develops signs of infection or uncontrolled pain  Complications: none apparent    Condition at discharge: good      Discharge Medications:   Prenatal vitamin daily for 6 months or the duration of nursing whichever is longer  Motrin 600 mg orally every 6 hours as needed for pain  Tylenol (over the counter) per bottle directions as needed for pain  Hydrocortisone cream 1% (over the counter) applied 1-2x daily to hemorrhoids as needed  Witch hazel pads for hemorrhoidal discomfort as needed    Discharge instructions: See after visit summary for complete information  Do not place anything (no partner, tampons or douche) in your vagina for 6 weeks  You may walk for exercise for the first 6 weeks then gradually return to your usual activities     Please do not drive for 1 week if you have no stitches and for 2 weeks if you have stitches or underwent a  delivery     You may take baths or shower per your preference     Please look at your bust (breasts) in the mirror daily and call for redness or tenderness or increased warmth     If you have had a  please look at your incision daily as well and call us for increasing redness or steady drainage from the incision     Please call us for temperature > 100 4*F or 38* C, worsening pain or a foul discharge      Disposition: Home    Planned Readmission: No    Ankush Banuelos MD  OB/GYN PGY-1  20  10:18 AM

## 2020-09-03 NOTE — OB LABOR/OXYTOCIN SAFETY PROGRESS
Oxytocin Safety Progress Check Note - Nino Pelt 27 y o  female MRN: [id number]    Unit/Bed#: -01 Encounter: [id number]    Dose (hoang-units/min) Oxytocin: 26 hoang-units/min(Per Dr Ronald Chaves)  Contraction Frequency (minutes): 2-3  Contraction Quality: Strong  Tachysystole: No   Cervical Dilation: 10  Dilation Complete Date: 09/03/20  Dilation Complete Time: 0353  Cervical Effacement: 100  Fetal Station: 0  Baseline Rate: 155 bpm  Fetal Heart Rate: 148 BPM  FHR Category: Category II  Oxytocin Safety Progress Check: Safety check completed            Vital Signs: reviewed  Vitals:    09/03/20 0340   BP: 123/76   Pulse: 80   Resp:    Temp:    SpO2:            Notes/comments: patient achieved complete cervical dilation at 0353  Having late decelerations  Repositioned and given fluid bolus  Dr Niesha Slater aware         Steven Whitehead MD 9/3/2020 3:58 AM

## 2020-09-03 NOTE — PLAN OF CARE
Problem: PAIN - ADULT  Goal: Verbalizes/displays adequate comfort level or baseline comfort level  Description: Interventions:  - Encourage patient to monitor pain and request assistance  - Assess pain using appropriate pain scale  - Administer analgesics based on type and severity of pain and evaluate response  - Implement non-pharmacological measures as appropriate and evaluate response  - Consider cultural and social influences on pain and pain management  - Notify physician/advanced practitioner if interventions unsuccessful or patient reports new pain  9/3/2020 0633 by Lili Ruiz RN  Outcome: Progressing  9/2/2020 1952 by Lili Ruiz RN  Outcome: Progressing     Problem: INFECTION - ADULT  Goal: Absence or prevention of progression during hospitalization  Description: INTERVENTIONS:  - Assess and monitor for signs and symptoms of infection  - Monitor lab/diagnostic results  - Monitor all insertion sites, i e  indwelling lines, tubes, and drains  - Monitor endotracheal if appropriate and nasal secretions for changes in amount and color  - [place] appropriate cooling/warming therapies per order  - Administer medications as ordered  - Instruct and encourage patient and family to use good hand hygiene technique  - Identify and instruct in appropriate isolation precautions for identified infection/condition  9/3/2020 0633 by Lili Ruiz RN  Outcome: Progressing  9/2/2020 1952 by Lili Ruiz RN  Outcome: Progressing  Goal: Absence of fever/infection during neutropenic period  Description: INTERVENTIONS:  - Monitor WBC    9/3/2020 0633 by Lili Ruiz RN  Outcome: Progressing  9/2/2020 1952 by Lili Ruiz RN  Outcome: Progressing     Problem: SAFETY ADULT  Goal: Patient will remain free of falls  Description: INTERVENTIONS:  - Assess patient frequently for physical needs  -  Identify cognitive and physical deficits and behaviors that affect risk of falls    -  [place] fall precautions as indicated by assessment   - Educate patient/family on patient safety including physical limitations  - Instruct patient to call for assistance with activity based on assessment  - Modify environment to reduce risk of injury  - Consider OT/PT consult to assist with strengthening/mobility  9/3/2020 0633 by Lonny Segovia RN  Outcome: Progressing  9/2/2020 1952 by Lonny Segovia RN  Outcome: Progressing  Goal: Maintain or return to baseline ADL function  Description: INTERVENTIONS:  -  Assess patient's ability to carry out ADLs; assess patient's baseline for ADL function and identify physical deficits which impact ability to perform ADLs (bathing, care of mouth/teeth, toileting, grooming, dressing, etc )  - Assess/evaluate cause of self-care deficits   - Assess range of motion  - Assess patient's mobility; develop plan if impaired  - Assess patient's need for assistive devices and provide as appropriate  - Encourage maximum independence but intervene and supervise when necessary  - Involve family in performance of ADLs  - Assess for home care needs following discharge   - Consider OT consult to assist with ADL evaluation and planning for discharge  - Provide patient education as appropriate  9/3/2020 0633 by Lonny Segovia RN  Outcome: Progressing  9/2/2020 1952 by Lonny Segovia RN  Outcome: Progressing  Goal: Maintain or return mobility status to optimal level  Description: INTERVENTIONS:  - Assess patient's baseline mobility status (ambulation, transfers, stairs, etc )    - Identify cognitive and physical deficits and behaviors that affect mobility  - Identify mobility aids required to assist with transfers and/or ambulation (gait belt, sit-to-stand, lift, walker, cane, etc )  - [place] fall precautions as indicated by assessment  - Record patient progress and toleration of activity level on Mobility SBAR; progress patient to next Phase/Stage  - Instruct patient to call for assistance with activity based on assessment  - Consider rehabilitation consult to assist with strengthening/weightbearing, etc   9/3/2020 6928 by Austin Dacosta RN  Outcome: Progressing  9/2/2020 1952 by Austin Dacosta RN  Outcome: Progressing     Problem: DISCHARGE PLANNING  Goal: Discharge to home or other facility with appropriate resources  Description: INTERVENTIONS:  - Identify barriers to discharge w/patient and caregiver  - Arrange for needed discharge resources and transportation as appropriate  - Identify discharge learning needs (meds, wound care, etc )  - Arrange for interpretive services to assist at discharge as needed  - Refer to Case Management Department for coordinating discharge planning if the patient needs post-hospital services based on physician/advanced practitioner order or complex needs related to functional status, cognitive ability, or social support system  9/3/2020 0633 by Austin Dacosta RN  Outcome: Progressing  9/2/2020 1952 by Austin Dacosta RN  Outcome: Progressing     Problem: Potential for Falls  Goal: Patient will remain free of falls  Description: INTERVENTIONS:  - Assess patient frequently for physical needs  -  Identify cognitive and physical deficits and behaviors that affect risk of falls    -  [place] fall precautions as indicated by assessment   - Educate patient/family on patient safety including physical limitations  - Instruct patient to call for assistance with activity based on assessment  - Modify environment to reduce risk of injury  - Consider OT/PT consult to assist with strengthening/mobility  9/3/2020 0633 by Austin Dacosta RN  Outcome: Progressing  9/2/2020 1952 by Austin Dacosta RN  Outcome: Progressing

## 2020-09-03 NOTE — OB LABOR/OXYTOCIN SAFETY PROGRESS
Oxytocin Safety Progress Check Note - Casie Lopez 27 y o  female MRN: [id number]    Unit/Bed#: -01 Encounter: [id number]    Dose (hoang-units/min) Oxytocin: 22 hoang-units/min  Contraction Frequency (minutes): 2 5-4  Contraction Quality: Moderate  Tachysystole: No   Cervical Dilation: 4-5        Cervical Effacement: 90  Fetal Station: -2  Baseline Rate: 140 bpm  Fetal Heart Rate: 138 BPM  FHR Category: Category II  Oxytocin Safety Progress Check: Safety check completed            Vital Signs: reviewed  Vitals:    09/02/20 2245   BP: 116/56   Pulse:    Resp:    Temp:    SpO2:            Notes/comments: comfortable with epidural  IUPC placed to help guide pitocin titration  To be discussed with Dr Edna Horvath MD 9/2/2020 11:08 PM

## 2020-09-03 NOTE — ANESTHESIA POSTPROCEDURE EVALUATION
Post-Op Assessment Note    CV Status:  Stable  Pain Score: 0    Pain management: adequate     Mental Status:  Alert and awake   Hydration Status:  Euvolemic   PONV Controlled:  Controlled   Airway Patency:  Patent      Post Op Vitals Reviewed: Yes        Post-op block assessment: catheter intact and no complications      No complications documented      BP   121/57   Temp 98 4   Pulse 91   Resp 16   SpO2 99

## 2020-09-04 LAB
ABO GROUP BLD: NORMAL
BASOPHILS # BLD AUTO: 0.05 THOUSANDS/ΜL (ref 0–0.1)
BASOPHILS NFR BLD AUTO: 0 % (ref 0–1)
BLD GP AB SCN SERPL QL: POSITIVE
EOSINOPHIL # BLD AUTO: 0.05 THOUSAND/ΜL (ref 0–0.61)
EOSINOPHIL NFR BLD AUTO: 0 % (ref 0–6)
ERYTHROCYTE [DISTWIDTH] IN BLOOD BY AUTOMATED COUNT: 13.2 % (ref 11.6–15.1)
FETAL CELL SCN BLD QL ROSETTE: NEGATIVE
HCT VFR BLD AUTO: 26.3 % (ref 34.8–46.1)
HGB BLD-MCNC: 8.8 G/DL (ref 11.5–15.4)
IMM GRANULOCYTES # BLD AUTO: 0.2 THOUSAND/UL (ref 0–0.2)
IMM GRANULOCYTES NFR BLD AUTO: 1 % (ref 0–2)
LYMPHOCYTES # BLD AUTO: 0.95 THOUSANDS/ΜL (ref 0.6–4.47)
LYMPHOCYTES NFR BLD AUTO: 5 % (ref 14–44)
MCH RBC QN AUTO: 31.2 PG (ref 26.8–34.3)
MCHC RBC AUTO-ENTMCNC: 33.5 G/DL (ref 31.4–37.4)
MCV RBC AUTO: 93 FL (ref 82–98)
MONOCYTES # BLD AUTO: 0.61 THOUSAND/ΜL (ref 0.17–1.22)
MONOCYTES NFR BLD AUTO: 3 % (ref 4–12)
NEUTROPHILS # BLD AUTO: 16.11 THOUSANDS/ΜL (ref 1.85–7.62)
NEUTS SEG NFR BLD AUTO: 91 % (ref 43–75)
NRBC BLD AUTO-RTO: 0 /100 WBCS
PLATELET # BLD AUTO: 226 THOUSANDS/UL (ref 149–390)
PMV BLD AUTO: 10 FL (ref 8.9–12.7)
RBC # BLD AUTO: 2.82 MILLION/UL (ref 3.81–5.12)
RH BLD: NEGATIVE
WBC # BLD AUTO: 17.97 THOUSAND/UL (ref 4.31–10.16)

## 2020-09-04 PROCEDURE — 86850 RBC ANTIBODY SCREEN: CPT | Performed by: OBSTETRICS & GYNECOLOGY

## 2020-09-04 PROCEDURE — 85461 HEMOGLOBIN FETAL: CPT | Performed by: OBSTETRICS & GYNECOLOGY

## 2020-09-04 PROCEDURE — 85025 COMPLETE CBC W/AUTO DIFF WBC: CPT | Performed by: OBSTETRICS & GYNECOLOGY

## 2020-09-04 PROCEDURE — 86901 BLOOD TYPING SEROLOGIC RH(D): CPT | Performed by: OBSTETRICS & GYNECOLOGY

## 2020-09-04 PROCEDURE — 86900 BLOOD TYPING SEROLOGIC ABO: CPT | Performed by: OBSTETRICS & GYNECOLOGY

## 2020-09-04 PROCEDURE — 99024 POSTOP FOLLOW-UP VISIT: CPT | Performed by: STUDENT IN AN ORGANIZED HEALTH CARE EDUCATION/TRAINING PROGRAM

## 2020-09-04 RX ORDER — IBUPROFEN 600 MG/1
600 TABLET ORAL EVERY 6 HOURS SCHEDULED
Status: DISCONTINUED | OUTPATIENT
Start: 2020-09-06 | End: 2020-09-04

## 2020-09-04 RX ORDER — IBUPROFEN 600 MG/1
600 TABLET ORAL EVERY 6 HOURS SCHEDULED
Status: DISCONTINUED | OUTPATIENT
Start: 2020-09-04 | End: 2020-09-05 | Stop reason: HOSPADM

## 2020-09-04 RX ADMIN — HUMAN RHO(D) IMMUNE GLOBULIN 300 MCG: 300 INJECTION, SOLUTION INTRAMUSCULAR at 15:56

## 2020-09-04 RX ADMIN — KETOROLAC TROMETHAMINE 30 MG: 30 INJECTION, SOLUTION INTRAMUSCULAR at 06:39

## 2020-09-04 RX ADMIN — OXYCODONE HYDROCHLORIDE 5 MG: 5 TABLET ORAL at 20:57

## 2020-09-04 RX ADMIN — DOCUSATE SODIUM 100 MG: 100 CAPSULE ORAL at 08:10

## 2020-09-04 RX ADMIN — ENOXAPARIN SODIUM 40 MG: 40 INJECTION SUBCUTANEOUS at 12:50

## 2020-09-04 RX ADMIN — SENNOSIDES 8.6 MG: 8.6 TABLET, FILM COATED ORAL at 08:10

## 2020-09-04 RX ADMIN — KETOROLAC TROMETHAMINE 30 MG: 30 INJECTION, SOLUTION INTRAMUSCULAR at 01:43

## 2020-09-04 RX ADMIN — DOCUSATE SODIUM 100 MG: 100 CAPSULE ORAL at 19:25

## 2020-09-04 RX ADMIN — Medication 1 TABLET: at 08:10

## 2020-09-04 RX ADMIN — IBUPROFEN 600 MG: 600 TABLET, FILM COATED ORAL at 20:55

## 2020-09-04 RX ADMIN — IBUPROFEN 600 MG: 600 TABLET, FILM COATED ORAL at 13:47

## 2020-09-04 NOTE — PROGRESS NOTES
Progress Note - OB/GYN  Fiordaliza Thacker 27 y o  female MRN: [id number]  Unit/Bed#: -01 Encounter: [id number]      Fiordaliza Thacker [place] [facility] [place]    Subjective/Objective     Chief Complaint: Postoperative State     Subjective:  Patient is s/p 1LTCS for cat II tracing   She is POD# 1  Her pain is well controlled  Her UOP is 1 44cc/kg/hr  Her incision is C/D/I  She is recovering well and is stable       Pain: no  Tolerating Oral Intake: yes  Voiding: VT today   Flatus: yes  Bowel Movement: no  Ambulating: yes  Breastfeeding: Breastfeeding  Chest Pain: no  Shortness of Breath: no  Leg Pain/Discomfort: no  Lochia: minimal    Vitals:   /60 (BP Location: Left arm)   Pulse 91   Temp 98 2 °F (36 8 °C) (Oral)   Resp 18   Ht 5' 4" (1 626 m)   Wt 104 kg (230 lb)   LMP 11/24/2019 (Exact Date)   SpO2 96%   Breastfeeding Yes   BMI 39 48 kg/m²       Intake/Output Summary (Last 24 hours) at 9/4/2020 0548  Last data filed at 9/4/2020 0543  Gross per 24 hour   Intake 2816 67 ml   Output 3515 ml   Net -698 33 ml       Invasive Devices     Peripheral Intravenous Line            Peripheral IV 09/03/20 Right Hand less than 1 day                Physical Exam:   GEN: Fiordaliza Thacker appears well, alert and oriented x 3, pleasant and cooperative   CARDIO: RRR, no murmurs or rubs  RESP:  CTAB, no wheezes or rales  ABDOMEN: soft, no tenderness, no distention, fundus @ umbilicus, Incision C/D/I  EXTREMITIES: SCDs on, Negative Emely's sign bilaterally      Labs:   Admission on 09/01/2020   Component Date Value    ABO Grouping 09/01/2020 A     Rh Factor 09/01/2020 Negative     Antibody Screen 09/01/2020 Positive     Specimen Expiration Date 09/01/2020 [id number]     WBC 09/01/2020 12 33*    RBC 09/01/2020 3 53*    Hemoglobin 09/01/2020 10 9*    Hematocrit 09/01/2020 32 5*    MCV 09/01/2020 92     MCH 09/01/2020 30 9     MCHC 09/01/2020 33 5     RDW 09/01/2020 12 8     Platelets 87/21/3986 299     MPV 09/01/2020 10 3     RPR 2020 Non-Reactive     Extra Tube 2020 hold for add ons     ANTIBODY ID  #1 2020 Passive D Antibody, Patient Received RHIG     Sodium 2020 135*    Potassium 2020 3 7     Chloride 2020 104     CO2 2020 19*    ANION GAP 2020 12     BUN 2020 11     Creatinine 2020 0 51*    Glucose 2020 76     Calcium 2020 10 2*    AST 2020 17     ALT 2020 26     Alkaline Phosphatase 2020 114     Total Protein 2020 6 6     Albumin 2020 2 7*    Total Bilirubin 2020 0 64     eGFR 2020 129     Creatinine, Ur 2020 30 0     Protein Urine Random 2020 <6     Prot/Creat Ratio, Ur 2020 <0 20*    pH, Cord Spencer 2020 7  342     pCO2, Cord Spencer 2020 41 8     pO2, Cord Spencer 2020 15 7     HCO3, Cord Spencer 2020 22 1    [facility] Exc, Cord Spencer 2020 -3 4*    O2 Cont, Cord Spencer 2020 5 8     O2 HGB,VENOUS CORD 2020 28 1     pH, Cord Art 2020 7 305     pCO2, Cord Art 2020 46 5     pO2, Cord Art 2020 11 1     HCO3, Cord Art 2020 22 6     Base Exc, Cord Art 2020 -3 9*    O2 Content, Cord Art 2020 2 8     O2 Hgb, Arterial Cord 2020 13 5          Patient Active Problem List   Diagnosis    Rh negative state in antepartum period    Former smoker    H/O LEEP (loop electrosurgical excision procedure) of cervix complicating pregnancy, first trimester    Maternal obesity, antepartum, third trimester    High-risk pregnancy in third trimester    39 weeks gestation of pregnancy    Gestational hypertension, third trimester    40 weeks gestation of pregnancy    Status post primary low transverse  section        Assessment and Plan     Tiffani Retana is POD# 1 s/p 1LTCS   She is recovering well and is stable     POD# 1   - Preop Hgb 10 9 --> post op Hgb f/u AM CBC   - UOP 1 44cc/kg/hr    - VT    -  SCDs for DVT prophylaxis - Continue current medications for pain management    - Encourage ambulation    - Encourage breast feeding     gHTN    - Pre E labs wnl    - -128/59-60   - Denies HA/VC/CP/SOB     Rh Negative    - Rhig/Rhogam ordered    Disposition    - Anticipate discharge home on POD# Rachel Gamble MD  9/4/2020  5:48 AM

## 2020-09-04 NOTE — PLAN OF CARE
Problem: PAIN - ADULT  Goal: Verbalizes/displays adequate comfort level or baseline comfort level  Description: Interventions:  - Encourage patient to monitor pain and request assistance  - Assess pain using appropriate pain scale  - Administer analgesics based on type and severity of pain and evaluate response  - Implement non-pharmacological measures as appropriate and evaluate response  - Consider cultural and social influences on pain and pain management  - Notify physician/advanced practitioner if interventions unsuccessful or patient reports new pain  Outcome: Progressing     Problem: INFECTION - ADULT  Goal: Absence or prevention of progression during hospitalization  Description: INTERVENTIONS:  - Assess and monitor for signs and symptoms of infection  - Monitor lab/diagnostic results  - Monitor all insertion sites, i e  indwelling lines, tubes, and drains  - Monitor endotracheal if appropriate and nasal secretions for changes in amount and color  - [place] appropriate cooling/warming therapies per order  - Administer medications as ordered  - Instruct and encourage patient and family to use good hand hygiene technique  - Identify and instruct in appropriate isolation precautions for identified infection/condition  Outcome: Progressing  Goal: Absence of fever/infection during neutropenic period  Description: INTERVENTIONS:  - Monitor WBC    Outcome: Progressing     Problem: SAFETY ADULT  Goal: Patient will remain free of falls  Description: INTERVENTIONS:  - Assess patient frequently for physical needs  -  Identify cognitive and physical deficits and behaviors that affect risk of falls    -  [place] fall precautions as indicated by assessment   - Educate patient/family on patient safety including physical limitations  - Instruct patient to call for assistance with activity based on assessment  - Modify environment to reduce risk of injury  - Consider OT/PT consult to assist with strengthening/mobility  Outcome: Progressing  Goal: Maintain or return to baseline ADL function  Description: INTERVENTIONS:  -  Assess patient's ability to carry out ADLs; assess patient's baseline for ADL function and identify physical deficits which impact ability to perform ADLs (bathing, care of mouth/teeth, toileting, grooming, dressing, etc )  - Assess/evaluate cause of self-care deficits   - Assess range of motion  - Assess patient's mobility; develop plan if impaired  - Assess patient's need for assistive devices and provide as appropriate  - Encourage maximum independence but intervene and supervise when necessary  - Involve family in performance of ADLs  - Assess for home care needs following discharge   - Consider OT consult to assist with ADL evaluation and planning for discharge  - Provide patient education as appropriate  Outcome: Progressing  Goal: Maintain or return mobility status to optimal level  Description: INTERVENTIONS:  - Assess patient's baseline mobility status (ambulation, transfers, stairs, etc )    - Identify cognitive and physical deficits and behaviors that affect mobility  - Identify mobility aids required to assist with transfers and/or ambulation (gait belt, sit-to-stand, lift, walker, cane, etc )  - [place] fall precautions as indicated by assessment  - Record patient progress and toleration of activity level on Mobility SBAR; progress patient to next Phase/Stage  - Instruct patient to call for assistance with activity based on assessment  - Consider rehabilitation consult to assist with strengthening/weightbearing, etc   Outcome: Progressing     Problem: DISCHARGE PLANNING  Goal: Discharge to home or other facility with appropriate resources  Description: INTERVENTIONS:  - Identify barriers to discharge w/patient and caregiver  - Arrange for needed discharge resources and transportation as appropriate  - Identify discharge learning needs (meds, wound care, etc )  - Arrange for interpretive services to assist at discharge as needed  - Refer to Case Management Department for coordinating discharge planning if the patient needs post-hospital services based on physician/advanced practitioner order or complex needs related to functional status, cognitive ability, or social support system  Outcome: Progressing     Problem: Potential for Falls  Goal: Patient will remain free of falls  Description: INTERVENTIONS:  - Assess patient frequently for physical needs  -  Identify cognitive and physical deficits and behaviors that affect risk of falls    -  [place] fall precautions as indicated by assessment   - Educate patient/family on patient safety including physical limitations  - Instruct patient to call for assistance with activity based on assessment  - Modify environment to reduce risk of injury  - Consider OT/PT consult to assist with strengthening/mobility  Outcome: Progressing     Problem: POSTPARTUM  Goal: Experiences normal postpartum course  Description: INTERVENTIONS:  - Monitor maternal vital signs  - Assess uterine involution and lochia  Outcome: Progressing  Goal: Appropriate maternal -  bonding  Description: INTERVENTIONS:  - Identify family support  - Assess for appropriate maternal/infant bonding   -Encourage maternal/infant bonding opportunities  - Referral to  or  as needed  Outcome: Progressing  Goal: Establishment of infant feeding pattern  Description: INTERVENTIONS:  - Assess breast/bottle feeding  - Refer to lactation as needed  Outcome: Progressing  Goal: Incision(s), wounds(s) or drain site(s) healing without S/S of infection  Description: INTERVENTIONS  - Assess and document risk factors for skin impairment   - Assess and document dressing, incision, wound bed, drain sites and surrounding tissue  - Consider nutrition services referral as needed  - Oral mucous membranes remain intact  - Provide patient/ family education  Outcome: Progressing

## 2020-09-05 VITALS
WEIGHT: 230 LBS | SYSTOLIC BLOOD PRESSURE: 131 MMHG | DIASTOLIC BLOOD PRESSURE: 72 MMHG | HEIGHT: 64 IN | TEMPERATURE: 98.1 F | HEART RATE: 65 BPM | RESPIRATION RATE: 18 BRPM | BODY MASS INDEX: 39.27 KG/M2 | OXYGEN SATURATION: 96 %

## 2020-09-05 PROCEDURE — 99024 POSTOP FOLLOW-UP VISIT: CPT | Performed by: STUDENT IN AN ORGANIZED HEALTH CARE EDUCATION/TRAINING PROGRAM

## 2020-09-05 RX ORDER — ACETAMINOPHEN 325 MG/1
975 TABLET ORAL EVERY 8 HOURS SCHEDULED
Qty: 30 TABLET | Refills: 0 | Status: SHIPPED | OUTPATIENT
Start: 2020-09-05 | End: 2020-10-01 | Stop reason: ALTCHOICE

## 2020-09-05 RX ORDER — IBUPROFEN 600 MG/1
600 TABLET ORAL EVERY 6 HOURS SCHEDULED
Qty: 30 TABLET | Refills: 0 | Status: SHIPPED | OUTPATIENT
Start: 2020-09-05 | End: 2020-10-01 | Stop reason: ALTCHOICE

## 2020-09-05 RX ORDER — ACETAMINOPHEN 325 MG/1
975 TABLET ORAL EVERY 8 HOURS SCHEDULED
Status: DISCONTINUED | OUTPATIENT
Start: 2020-09-05 | End: 2020-09-05 | Stop reason: HOSPADM

## 2020-09-05 RX ORDER — OXYCODONE HYDROCHLORIDE 5 MG/1
5 TABLET ORAL EVERY 4 HOURS PRN
Qty: 15 TABLET | Refills: 0 | Status: SHIPPED | OUTPATIENT
Start: 2020-09-05 | End: 2020-09-10 | Stop reason: ALTCHOICE

## 2020-09-05 RX ADMIN — SENNOSIDES 8.6 MG: 8.6 TABLET, FILM COATED ORAL at 08:13

## 2020-09-05 RX ADMIN — Medication 1 TABLET: at 08:13

## 2020-09-05 RX ADMIN — DOCUSATE SODIUM 100 MG: 100 CAPSULE ORAL at 08:13

## 2020-09-05 RX ADMIN — ENOXAPARIN SODIUM 40 MG: 40 INJECTION SUBCUTANEOUS at 08:13

## 2020-09-05 RX ADMIN — IBUPROFEN 600 MG: 600 TABLET, FILM COATED ORAL at 15:11

## 2020-09-05 RX ADMIN — OXYCODONE HYDROCHLORIDE 5 MG: 5 TABLET ORAL at 15:57

## 2020-09-05 RX ADMIN — OXYCODONE HYDROCHLORIDE 5 MG: 5 TABLET ORAL at 11:44

## 2020-09-05 RX ADMIN — OXYCODONE HYDROCHLORIDE 5 MG: 5 TABLET ORAL at 05:19

## 2020-09-05 RX ADMIN — IBUPROFEN 600 MG: 600 TABLET, FILM COATED ORAL at 08:17

## 2020-09-05 NOTE — DISCHARGE SUMMARY
Discharge Summary - Barbara Boswell 27 y o  female MRN: [id number]    Unit/Bed#: -01 Encounter: [id number]    Admission Date: 2020     Discharge Date: 20      Admitting Diagnosis:   1  Pregnancy at 40w4d  2  Gestational HTN  3  Maternal obesity  4  Rh negative  5  Hx of LEEP  6  Anxiety  7  Former smoker    Discharge Diagnosis: same, delivered    Procedures: primary  section, low transverse incision    Delivering Attending: Geovanna Ospina MD  Discharging Attending: Hyacinth Downey MD    Hospital Course:     Barbara Boswell is a 27 y o  [place] at 40w4d wks who was initially admitted on 20 for Induction of labor  She was noted to have elevated Bps on admission and ultimately met criteria for Gestational HTN  Pre-eclampsia labs were drawn and WNL  She received cytotec and blanc balloon for cervical ripening followed by induction with pitocin at 1126 on 20  Amniotomy was performed at  for clear fluid  Pt received an epidural during her labor course for anesthesia  Pt progressed to complete dilation without complication at 9317  After pushing for approximately 30mins, FHT with recurrent late decelerations and fetal tachycardia despite resuscitative measures  Fetal station not conducive to operative delivery, therefore primary  section recommended  Pt agreeable  She delivered a viable female  on 9/3/20 at [phone]  Weight 7lbs 11oz via primary  section, low transverse incision  Apgars were 8 (1 min) and 9 (5 min)   was transferred to  nursery  Patient tolerated the procedure well and was transferred to recovery in stable condition  Her post-operative course was uncomplicated  Pre-delivery hemoglobin was 10 9, postoperative was 8 8  Her postoperative pain was well controlled with oral analgesics  She received RhoGAM for Rh negative status  On day of discharge, she was ambulating and able to reasonably perform all ADLs   She was voiding and had appropriate bowel function  Pain was well controlled  She was discharged home on post-operative day #2 without complications  Patient was instructed to follow up with her OB as an outpatient and was given appropriate warnings to call provider if she develops signs of infection or uncontrolled pain  Complications: none apparent    Condition at discharge: good      Discharge Medications:   Prenatal vitamin daily for 6 months or the duration of nursing whichever is longer  Motrin 600 mg orally every 6 hours as needed for pain  Tylenol (over the counter) per bottle directions as needed for pain  Hydrocortisone cream 1% (over the counter) applied 1-2x daily to hemorrhoids as needed  Witch hazel pads for hemorrhoidal discomfort as needed    Discharge instructions: See after visit summary for complete information  Do not place anything (no partner, tampons or douche) in your vagina for 6 weeks  You may walk for exercise for the first 6 weeks then gradually return to your usual activities     Please do not drive for 1 week if you have no stitches and for 2 weeks if you have stitches or underwent a  delivery     You may take baths or shower per your preference     Please look at your bust (breasts) in the mirror daily and call for redness or tenderness or increased warmth     If you have had a  please look at your incision daily as well and call us for increasing redness or steady drainage from the incision     Please call us for temperature > 100 4*F or 38* C, worsening pain or a foul discharge      Disposition: Home    Planned Readmission: No    Orin Milligan MD  OB/GYN PGY-1  20  2:38 PM

## 2020-09-05 NOTE — PLAN OF CARE
Problem: PAIN - ADULT  Goal: Verbalizes/displays adequate comfort level or baseline comfort level  Description: Interventions:  - Encourage patient to monitor pain and request assistance  - Assess pain using appropriate pain scale  - Administer analgesics based on type and severity of pain and evaluate response  - Implement non-pharmacological measures as appropriate and evaluate response  - Consider cultural and social influences on pain and pain management  - Notify physician/advanced practitioner if interventions unsuccessful or patient reports new pain  Outcome: Progressing     Problem: INFECTION - ADULT  Goal: Absence or prevention of progression during hospitalization  Description: INTERVENTIONS:  - Assess and monitor for signs and symptoms of infection  - Monitor lab/diagnostic results  - Monitor all insertion sites, i e  indwelling lines, tubes, and drains  - Monitor endotracheal if appropriate and nasal secretions for changes in amount and color  - [place] appropriate cooling/warming therapies per order  - Administer medications as ordered  - Instruct and encourage patient and family to use good hand hygiene technique  - Identify and instruct in appropriate isolation precautions for identified infection/condition  Outcome: Progressing  Goal: Absence of fever/infection during neutropenic period  Description: INTERVENTIONS:  - Monitor WBC    Outcome: Progressing     Problem: SAFETY ADULT  Goal: Patient will remain free of falls  Description: INTERVENTIONS:  - Assess patient frequently for physical needs  -  Identify cognitive and physical deficits and behaviors that affect risk of falls    -  [place] fall precautions as indicated by assessment   - Educate patient/family on patient safety including physical limitations  - Instruct patient to call for assistance with activity based on assessment  - Modify environment to reduce risk of injury  - Consider OT/PT consult to assist with strengthening/mobility  Outcome: Progressing  Goal: Maintain or return to baseline ADL function  Description: INTERVENTIONS:  -  Assess patient's ability to carry out ADLs; assess patient's baseline for ADL function and identify physical deficits which impact ability to perform ADLs (bathing, care of mouth/teeth, toileting, grooming, dressing, etc )  - Assess/evaluate cause of self-care deficits   - Assess range of motion  - Assess patient's mobility; develop plan if impaired  - Assess patient's need for assistive devices and provide as appropriate  - Encourage maximum independence but intervene and supervise when necessary  - Involve family in performance of ADLs  - Assess for home care needs following discharge   - Consider OT consult to assist with ADL evaluation and planning for discharge  - Provide patient education as appropriate  Outcome: Progressing  Goal: Maintain or return mobility status to optimal level  Description: INTERVENTIONS:  - Assess patient's baseline mobility status (ambulation, transfers, stairs, etc )    - Identify cognitive and physical deficits and behaviors that affect mobility  - Identify mobility aids required to assist with transfers and/or ambulation (gait belt, sit-to-stand, lift, walker, cane, etc )  - [place] fall precautions as indicated by assessment  - Record patient progress and toleration of activity level on Mobility SBAR; progress patient to next Phase/Stage  - Instruct patient to call for assistance with activity based on assessment  - Consider rehabilitation consult to assist with strengthening/weightbearing, etc   Outcome: Progressing     Problem: DISCHARGE PLANNING  Goal: Discharge to home or other facility with appropriate resources  Description: INTERVENTIONS:  - Identify barriers to discharge w/patient and caregiver  - Arrange for needed discharge resources and transportation as appropriate  - Identify discharge learning needs (meds, wound care, etc )  - Arrange for interpretive services to assist at discharge as needed  - Refer to Case Management Department for coordinating discharge planning if the patient needs post-hospital services based on physician/advanced practitioner order or complex needs related to functional status, cognitive ability, or social support system  Outcome: Progressing     Problem: Potential for Falls  Goal: Patient will remain free of falls  Description: INTERVENTIONS:  - Assess patient frequently for physical needs  -  Identify cognitive and physical deficits and behaviors that affect risk of falls    -  [place] fall precautions as indicated by assessment   - Educate patient/family on patient safety including physical limitations  - Instruct patient to call for assistance with activity based on assessment  - Modify environment to reduce risk of injury  - Consider OT/PT consult to assist with strengthening/mobility  Outcome: Progressing     Problem: POSTPARTUM  Goal: Experiences normal postpartum course  Description: INTERVENTIONS:  - Monitor maternal vital signs  - Assess uterine involution and lochia  Outcome: Progressing  Goal: Appropriate maternal -  bonding  Description: INTERVENTIONS:  - Identify family support  - Assess for appropriate maternal/infant bonding   -Encourage maternal/infant bonding opportunities  - Referral to  or  as needed  Outcome: Progressing  Goal: Establishment of infant feeding pattern  Description: INTERVENTIONS:  - Assess breast/bottle feeding  - Refer to lactation as needed  Outcome: Progressing  Goal: Incision(s), wounds(s) or drain site(s) healing without S/S of infection  Description: INTERVENTIONS  - Assess and document risk factors for skin impairment   - Assess and document dressing, incision, wound bed, drain sites and surrounding tissue  - Consider nutrition services referral as needed  - Oral mucous membranes remain intact  - Provide patient/ family education  Outcome: Progressing

## 2020-09-05 NOTE — DISCHARGE SUMMARY
Discharge Summary - Bertrand Grande 27 y o  female MRN: [id number]    Unit/Bed#: -01 Encounter: [id number]    Admission Date: 2020     Discharge Date: 20      Admitting Diagnosis:   1  Pregnancy at 40w4d  2  Gestational HTN  3  Maternal obesity  4  Rh negative  5  Hx of LEEP  6  Anxiety  7  Former smoker    Discharge Diagnosis: same, delivered    Procedures: primary  section, low transverse incision    Delivering Attending: Tana Mojica MD  Discharging Attending: Keyonna Goff MD    Hospital Course:     Bertrand Grande is a 27 y o  [place] at 40w4d wks who was initially admitted on 20 for Induction of labor  She was noted to have elevated Bps on admission and ultimately met criteria for Gestational HTN  Pre-eclampsia labs were drawn and WNL  She received cytotec and blanc balloon for cervical ripening followed by induction with pitocin at 1126 on 20  Amniotomy was performed at  for clear fluid  Pt received an epidural during her labor course for anesthesia  Pt progressed to complete dilation without complication at 9031  After pushing for approximately 30mins, FHT with recurrent late decelerations and fetal tachycardia despite resuscitative measures  Fetal station not conducive to operative delivery, therefore primary  section recommended  Pt agreeable  She delivered a viable female  on 9/3/20 at [phone]  Weight 7lbs 11oz via primary  section, low transverse incision  Apgars were 8 (1 min) and 9 (5 min)   was transferred to  nursery  Patient tolerated the procedure well and was transferred to recovery in stable condition  Her post-operative course was uncomplicated  Pre-delivery hemoglobin was 10 9, postoperative was 8 8  Her postoperative pain was well controlled with oral analgesics  She received RhoGAM for Rh negative status  On day of discharge, she was ambulating and able to reasonably perform all ADLs   She was voiding and had appropriate bowel function  Pain was well controlled  She was discharged home on post-operative day #2 without complications  Patient was instructed to follow up with her OB as an outpatient and was given appropriate warnings to call provider if she develops signs of infection or uncontrolled pain  Complications: none apparent    Condition at discharge: good      Discharge Medications:   Prenatal vitamin daily for 6 months or the duration of nursing whichever is longer  Motrin 600 mg orally every 6 hours as needed for pain  Tylenol (over the counter) per bottle directions as needed for pain  Hydrocortisone cream 1% (over the counter) applied 1-2x daily to hemorrhoids as needed  Witch hazel pads for hemorrhoidal discomfort as needed    Discharge instructions: See after visit summary for complete information  Do not place anything (no partner, tampons or douche) in your vagina for 6 weeks  You may walk for exercise for the first 6 weeks then gradually return to your usual activities     Please do not drive for 1 week if you have no stitches and for 2 weeks if you have stitches or underwent a  delivery     You may take baths or shower per your preference     Please look at your bust (breasts) in the mirror daily and call for redness or tenderness or increased warmth     If you have had a  please look at your incision daily as well and call us for increasing redness or steady drainage from the incision     Please call us for temperature > 100 4*F or 38* C, worsening pain or a foul discharge      Disposition: Home    Planned Readmission: No    Sachin Iyer MD  OB/GYN PGY-1  20  10:53 AM

## 2020-09-05 NOTE — LACTATION NOTE
This note was copied from a baby's chart  Discharge Consult  Alistair Summers has not done any pumping while inpatient  When questioned, Alistair Summers states she did not want to pump with the hospital grade pump  Provided education and support on personal Spectra 2 pump  Offered to fit fljanusz, Alistair Summers declined  Met with mother to go over discharge breastfeeding booklet including the feeding log  Emphasized 8 or more (12) feedings in a 24 hour period, what to expect for the number of diapers per day of life and the progression of properties of the  stooling pattern  Reviewed breastfeeding and your lifestyle, storage and preparation of breast milk, how to keep you breast pump clean, the employed breastfeeding mother and paced bottle feeding handouts  Booklet included Breastfeeding Resources for after discharge including access to the number for the [address]  Information on hand expression given  Discussed benefits of knowing how to manually express breast including stimulating milk supply, softening nipple for latch and evacuating breast in the event of engorgement  Instructions given on pumping  Discussed when to start, frequency, different pumps available versus manual expression  Discussed hygiene of hands and supplies as well as assembly, placement of flanges, size of flanged, preparing the breast and cycles and suction settings on pump  Demonstrated use of hand pump  Discussed labeling of milk, storage, and preparation of stored milk

## 2020-09-05 NOTE — PROGRESS NOTES
Progress Note - OB/GYN   Bertrand Grande 27 y o  female MRN: [id number]  Unit/Bed#: -01 Encounter: [id number]      Bertrand Grande is a patient of [facility] [place]    Assessment:  Post operative day #2 s/p 1LTCS, stable, and doing well    Plan:  1  Gestational hypertension   - Bps overnight 110-131/59-72   - Asymptomatic   2  Hemodynamically stable   - Pre-op Hb 10 9 --> post-op Hb 8 8   - Vitals WNL, currently asymptomatic  3  Spontaneously voiding, UOP adequate  4  Continue routine post partum care   - Encourage ambulation   - Encourage breastfeeding  5  Continue current meds   - See list below   - Pain adequately controlled with PO analgesics  6  Rh negative   - received RhoGAM yesterday  7  DVT ppx   - Lovenox 40mg qd  8  Disposition   - Stable   - patient requesting early discharge today postop day 2    Subjective/Objective     Chief Complaint:     Doing well  No complaints  Patient would like to go home early today    Subjective:     Pain: controlled with po meds  Tolerating Oral Intake: yes  Voiding: yes  Flatus: yes  Bowel Movement: no  Ambulating: yes  Breastfeeding: Breastfeeding  Chest Pain: no  Shortness of Breath: no  Leg Pain/Discomfort: no  Lochia: moderate    Objective:   Vitals:   /72   Pulse 65   Temp 98 1 °F (36 7 °C) (Oral)   Resp 18   Ht 5' 4" (1 626 m)   Wt 104 kg (230 lb)   LMP 11/24/2019 (Exact Date)   SpO2 96%   Breastfeeding No   BMI 39 48 kg/m²   Body mass index is 39 48 kg/m²    I/O       09/03 0701 - 09/04 0700 09/04 0701 - 09/05 0700 09/05 0701 - 09/06 0700    I V  (mL/kg) 1487 5 (14 3)      Total Intake(mL/kg) 1487 5 (14 3)      Urine (mL/kg/hr) 2975 (1 2)      Blood       Total Output 2975      Net -1487 5                 Lab Results   Component Value Date    WBC 17 97 (H) 09/04/2020    HGB 8 8 (L) 09/04/2020    HCT 26 3 (L) 09/04/2020    MCV 93 09/04/2020     09/04/2020       Meds/Allergies     Physical Exam:  General: in no apparent distress and well developed and well nourished  Cardiovascular: Cor RRR  Lungs: clear to auscultation bilaterally  Abdomen: abdomen is soft without significant tenderness, masses, organomegaly or guarding; incision c/d/i closed with running absorbable suture  Fundus: Firm and non-tender, at the level of the umbilicus  Lower extremeties: nontender    Labs/Tests:   No results found for this or any previous visit (from the past 24 hour(s))      MEDS:   Current Facility-Administered Medications   Medication Dose Route Frequency    acetaminophen (TYLENOL) tablet 650 mg  650 mg Oral Q4H PRN    benzocaine-menthol-lanolin-aloe (DERMOPLAST) 20-0 5 % topical spray   Topical 4x Daily PRN    calcium carbonate (TUMS) chewable tablet 1,000 mg  1,000 mg Oral Daily PRN    diphenhydrAMINE (BENADRYL) injection 25 mg  25 mg Intravenous Q6H PRN    docusate sodium (COLACE) capsule 100 mg  100 mg Oral BID    enoxaparin (LOVENOX) subcutaneous injection 40 mg  40 mg Subcutaneous Q24H CLAY    hydrocortisone 1 % cream 1 application  1 application Topical 4x Daily PRN    HYDROmorphone (DILAUDID) injection 0 2 mg  0 2 mg Intravenous Q5 Min PRN    HYDROmorphone (DILAUDID) injection 0 5 mg  0 5 mg Intravenous Q1H PRN    ibuprofen (MOTRIN) tablet 600 mg  600 mg Oral Q6H [address]    ondansetron (ZOFRAN) injection 4 mg  4 mg Intravenous Q8H PRN    oxyCODONE (ROXICODONE) immediate release tablet 10 mg  10 mg Oral Q4H PRN    oxyCODONE (ROXICODONE) IR tablet 5 mg  5 mg Oral Q4H PRN    prenatal multivitamin tablet 1 tablet  1 tablet Oral Daily    senna (SENOKOT) tablet 8 6 mg  1 tablet Oral Daily    witch hazel-glycerin (TUCKS) topical pad 1 pad  1 pad Topical Q2H PRN     Invasive Devices     None                 María Mojica MD  PGY-1 OB/GYN   9/5/2020 10:09 AM

## 2020-09-05 NOTE — PLAN OF CARE
Problem: PAIN - ADULT  Goal: Verbalizes/displays adequate comfort level or baseline comfort level  Description: Interventions:  - Encourage patient to monitor pain and request assistance  - Assess pain using appropriate pain scale  - Administer analgesics based on type and severity of pain and evaluate response  - Implement non-pharmacological measures as appropriate and evaluate response  - Consider cultural and social influences on pain and pain management  - Notify physician/advanced practitioner if interventions unsuccessful or patient reports new pain  9/5/2020 1623 by Mela Valenzuela RN  Outcome: Adequate for Discharge  9/5/2020 0827 by Mela Valenzuela RN  Outcome: Progressing     Problem: INFECTION - ADULT  Goal: Absence or prevention of progression during hospitalization  Description: INTERVENTIONS:  - Assess and monitor for signs and symptoms of infection  - Monitor lab/diagnostic results  - Monitor all insertion sites, i e  indwelling lines, tubes, and drains  - Monitor endotracheal if appropriate and nasal secretions for changes in amount and color  - [place] appropriate cooling/warming therapies per order  - Administer medications as ordered  - Instruct and encourage patient and family to use good hand hygiene technique  - Identify and instruct in appropriate isolation precautions for identified infection/condition  9/5/2020 1623 by Mela Valenzuela RN  Outcome: Adequate for Discharge  9/5/2020 0827 by Mela Valenzuela RN  Outcome: Progressing  Goal: Absence of fever/infection during neutropenic period  Description: INTERVENTIONS:  - Monitor WBC    9/5/2020 1623 by Mela Valenzuela RN  Outcome: Adequate for Discharge  9/5/2020 0827 by Mela Valenzuela RN  Outcome: Progressing     Problem: SAFETY ADULT  Goal: Patient will remain free of falls  Description: INTERVENTIONS:  - Assess patient frequently for physical needs  -  Identify cognitive and physical deficits and behaviors that affect risk of falls    - [place] fall precautions as indicated by assessment   - Educate patient/family on patient safety including physical limitations  - Instruct patient to call for assistance with activity based on assessment  - Modify environment to reduce risk of injury  - Consider OT/PT consult to assist with strengthening/mobility  9/5/2020 1623 by Rebekah Edwards RN  Outcome: Adequate for Discharge  9/5/2020 0827 by Rebekah Edwards RN  Outcome: Progressing  Goal: Maintain or return to baseline ADL function  Description: INTERVENTIONS:  -  Assess patient's ability to carry out ADLs; assess patient's baseline for ADL function and identify physical deficits which impact ability to perform ADLs (bathing, care of mouth/teeth, toileting, grooming, dressing, etc )  - Assess/evaluate cause of self-care deficits   - Assess range of motion  - Assess patient's mobility; develop plan if impaired  - Assess patient's need for assistive devices and provide as appropriate  - Encourage maximum independence but intervene and supervise when necessary  - Involve family in performance of ADLs  - Assess for home care needs following discharge   - Consider OT consult to assist with ADL evaluation and planning for discharge  - Provide patient education as appropriate  9/5/2020 1623 by Rebekah Edwards RN  Outcome: Adequate for Discharge  9/5/2020 0827 by Rebekah Edwards RN  Outcome: Progressing  Goal: Maintain or return mobility status to optimal level  Description: INTERVENTIONS:  - Assess patient's baseline mobility status (ambulation, transfers, stairs, etc )    - Identify cognitive and physical deficits and behaviors that affect mobility  - Identify mobility aids required to assist with transfers and/or ambulation (gait belt, sit-to-stand, lift, walker, cane, etc )  - [place] fall precautions as indicated by assessment  - Record patient progress and toleration of activity level on Mobility SBAR; progress patient to next Phase/Stage  - Instruct patient to call for assistance with activity based on assessment  - Consider rehabilitation consult to assist with strengthening/weightbearing, etc   9/5/2020 1623 by Mela Valenzuela RN  Outcome: Adequate for Discharge  9/5/2020 0827 by Mela Valenzuela RN  Outcome: Progressing     Problem: DISCHARGE PLANNING  Goal: Discharge to home or other facility with appropriate resources  Description: INTERVENTIONS:  - Identify barriers to discharge w/patient and caregiver  - Arrange for needed discharge resources and transportation as appropriate  - Identify discharge learning needs (meds, wound care, etc )  - Arrange for interpretive services to assist at discharge as needed  - Refer to Case Management Department for coordinating discharge planning if the patient needs post-hospital services based on physician/advanced practitioner order or complex needs related to functional status, cognitive ability, or social support system  9/5/2020 1623 by Mela Valenzuela RN  Outcome: Adequate for Discharge  9/5/2020 0827 by Mela Valenzuela RN  Outcome: Progressing     Problem: Potential for Falls  Goal: Patient will remain free of falls  Description: INTERVENTIONS:  - Assess patient frequently for physical needs  -  Identify cognitive and physical deficits and behaviors that affect risk of falls    -  [place] fall precautions as indicated by assessment   - Educate patient/family on patient safety including physical limitations  - Instruct patient to call for assistance with activity based on assessment  - Modify environment to reduce risk of injury  - Consider OT/PT consult to assist with strengthening/mobility  9/5/2020 1623 by Mela Valenzuela RN  Outcome: Adequate for Discharge  9/5/2020 0827 by Mela Valenzuela RN  Outcome: Progressing     Problem: POSTPARTUM  Goal: Experiences normal postpartum course  Description: INTERVENTIONS:  - Monitor maternal vital signs  - Assess uterine involution and lochia  9/5/2020 1623 by Rohan Gómez Lafe Duane, RN  Outcome: Adequate for Discharge  2020 by Elisabeth Corcoran RN  Outcome: Progressing  Goal: Appropriate maternal -  bonding  Description: INTERVENTIONS:  - Identify family support  - Assess for appropriate maternal/infant bonding   -Encourage maternal/infant bonding opportunities  - Referral to  or  as needed  2020 by Elisabeth Corcoran RN  Outcome: Adequate for Discharge  2020 by Elisabeth Corcoran RN  Outcome: Progressing  Goal: Establishment of infant feeding pattern  Description: INTERVENTIONS:  - Assess breast/bottle feeding  - Refer to lactation as needed  2020 by Elisabeth Corcoran RN  Outcome: Adequate for Discharge  2020 by Elisabeth Corcoran RN  Outcome: Progressing  Goal: Incision(s), wounds(s) or drain site(s) healing without S/S of infection  Description: INTERVENTIONS  - Assess and document risk factors for skin impairment   - Assess and document dressing, incision, wound bed, drain sites and surrounding tissue  - Consider nutrition services referral as needed  - Oral mucous membranes remain intact  - Provide patient/ family education  2020 by Elisabeth Corcoran RN  Outcome: Adequate for Discharge  2020 by Elisabeth Corcoran RN  Outcome: Progressing

## 2020-09-09 NOTE — UTILIZATION REVIEW
Notification of Maternity/Delivery & [place] Birth Information for Admission   Notification of Maternity/Delivery for Admission to our facility [address]  Be advised that this patient was admitted to our facility under Inpatient Status  Contact Luz Elena Boland at [phone] for additional admission information  [facility] PARENT/CHILD HEALTH  DEPT DEDICATED Suleman Bonilla [phone]  Mother &  Information   Patient Name: Mary Lou Carey   YOB: 1990   Delivering clinician:    OB History        2    Para   1    Term   1       0    AB   1    Living   1       SAB   1    TAB   0    Ectopic   0    Multiple   0    Live Births   1                Name & MRN:   Information for the patient's :  Samia Nieves [[id number]]      Delivery Information:  Sex: female  Delivered 9/3/2020 5:52 AM by , Low Transverse; Gestational Age: 36w2d    [place] Measurements:  Weight: 7 lb 11 3 oz (3495 g); Height: 20 5"    APGAR 1 minute 5 minutes 10 minutes   Totals: 8 9       Birth Information: 27 y o  female MRN: [id number] Unit/Bed#: -01 Estimated Date of Delivery: 20  Birthweight: No birth weight on file  Gestational Age: <None> Delivery Type: , Low Transverse      Authorization Information   Servicing Facility:   [place] [address]  Tax ID: [id number]  NPI: [id number] Attending Provider/NPI:  Phone:  Address: Andrés Avila [[id number]]  [phone]  Same as Facility   Place of Service Code:  24 Place of Service Name: [address] [place] [address]   Start Date: 20   Discharge Date & Time: 2020  4:45 PM    Type of Admission: Inpatient Status Discharge Disposition (if discharged): Home/Self Care   Patient Diagnoses:   Encounter for elective induction of labor [Z34 90]  The primary encounter diagnosis was Status post primary low transverse  section   Diagnoses of 39 weeks gestation of pregnancy and Non-reassuring fetal heart tones complicating pregnancy, antepartum were also pertinent to this visit  1  Status post primary low transverse  section    2  39 weeks gestation of pregnancy    3  Non-reassuring fetal heart tones complicating pregnancy, antepartum       Orders: Admission Orders (From admission, onward)     Ordered        20  Inpatient Admission  Once                    Assigned Utilization Review Contact: Ivonne Timmons  Utilization   Network Utilization Review Department  Phone: [phone]; Fax [phone]  Email: [email or web address]

## 2020-09-10 ENCOUNTER — POSTPARTUM VISIT (OUTPATIENT)
Dept: OBGYN CLINIC | Facility: CLINIC | Age: 30
End: 2020-09-10

## 2020-09-10 VITALS
DIASTOLIC BLOOD PRESSURE: 72 MMHG | BODY MASS INDEX: 37.63 KG/M2 | TEMPERATURE: 98 F | WEIGHT: 219.2 LBS | SYSTOLIC BLOOD PRESSURE: 124 MMHG

## 2020-09-10 DIAGNOSIS — Z98.891 S/P REPEAT LOW TRANSVERSE C-SECTION: Primary | ICD-10-CM

## 2020-09-10 PROCEDURE — 99024 POSTOP FOLLOW-UP VISIT: CPT | Performed by: NURSE PRACTITIONER

## 2020-09-10 NOTE — PATIENT INSTRUCTIONS
Postpartum Depression   WHAT YOU NEED TO KNOW:   What is postpartum depression? Postpartum depression is a mood disorder that occurs after giving birth  A mood is an emotion or a feeling  Moods affect your behavior and how you feel about yourself and life in general  Depression is a sad mood that you cannot control  Women often feel sad, afraid, or nervous after their baby is born  These feelings are called postpartum blues or baby blues, and they usually go away in 1 to 2 weeks  With postpartum depression, these symptoms get worse and continue for more than 2 weeks  Postpartum depression is a serious condition that affects your daily activities and relationships  What causes postpartum depression? Healthcare providers do not know exactly what causes postpartum depression  It may be caused by a sudden drop in hormone levels after childbirth  A previous episode of postpartum depression or a family history of depression may increase your risk  Several things may trigger postpartum depression:  · Lack of support from the baby's father or other family members    · Feeling more tired than usual    · Stress, a poor diet, or lack of sleep    · Pain after childbirth or pain during breastfeeding    · Sudden change in lifestyle  How is postpartum depression diagnosed? Postpartum depression affects your daily activities and your relationships with other people  Healthcare providers will ask you questions about your signs and symptoms and how they are affecting your life  The symptoms of postpartum depression usually begin within 1 month after childbirth  You feel depressed or lose interest in activities you enjoy nearly every day for at least 2 weeks  You also have 4 or more of the following symptoms:  · You feel tired or have less energy than usual      · You feel unimportant or guilty most of the time  · You think about hurting or killing yourself  · Your appetite changes   You may lose your appetite and lose weight without trying  Your appetite may also increase and you may gain weight  · You are restless, irritable, or withdrawn  · You have trouble concentrating and remembering things  You have trouble doing daily tasks or making decisions  · You have trouble sleeping, even after the baby is asleep  How is postpartum depression treated? · Psychotherapy:  During therapy, you will talk with healthcare providers about how to cope with your feelings and moods  This can be done alone or in a group  It may also be done with family members or your partner  · Antidepressants: This medicine is given to decrease or stop the symptoms of depression  You usually need to take antidepressants for several weeks before you begin to feel better  Do not stop taking antidepressants unless your healthcare provider tells you to  Healthcare providers may try a different antidepressant if one type does not work  What can I do to feel better? · Rest:  Do not try to do everything all at the same time  Do only what is needed and let other things wait until later  Ask your family or friends for help, especially if you have other children  Ask your partner to help with night feedings or other baby care  Try to sleep when the baby naps  · Get emotional support:  Share your feelings with your partner, a friend, or another mother  · Take care of yourself:  Shower and dress each day  Do not skip meals  Try to get out of the house a little each day  Get regular exercise  Eat a healthy diet  Avoid alcohol because it can make your depression worse  Do not isolate yourself  Go for a walk or meet with a friend  It is also important that you have some time by yourself each day  How do I find support and more information?    · [address] [place] [address], Public Information & Communication Branch  [address] [place] [address] W, [address] [facility], [address]  Shira [place] [address]   Phone: [phone]  Phone: [phone]  Web Address: Carlos perez  When should I contact my healthcare provider? · You cannot make it to your next visit  · Your depression does not get better with treatment or it gets worse  · You have questions or concerns about your condition or care  When should I seek immediate care or call 911? · You think about hurting or killing yourself, your baby, or someone else  · You feel like other people want to hurt you  · You hear voices telling you to hurt yourself or your baby  CARE AGREEMENT:   You have the right to help plan your care  Learn about your health condition and how it may be treated  Discuss treatment options with your caregivers to decide what care you want to receive  You always have the right to refuse treatment  The above information is an  only  It is not intended as medical advice for individual conditions or treatments  Talk to your doctor, nurse or pharmacist before following any medical regimen to see if it is safe and effective for you  © 2017 [address] [employer] Information is for End User's use only and may not be sold, redistributed or otherwise used for commercial purposes  All illustrations and images included in CareNotes® are the copyrighted property of A D A M , Inc  or Kush Brewer

## 2020-09-10 NOTE — PROGRESS NOTES
Assessment/Plan:    No problem-specific Assessment & Plan notes found for this encounter  Diagnoses and all orders for this visit:    1  S/P repeat low transverse   OK to drive, lift just baby's weight  RTO for 3 wk pp visit  All questions and concerns answered, call with any problems  Subjective:      Patient ID: Bertrand Grande is a 27 y o  female  HPI  Pleasant 27 y o female presents today for an incision check 1 week pp after her primary C/S for Cat II tracing on 9/3/2020  Baby Girl  Severo Leriche PPDS Screen "3"  The following portions of the patient's history were reviewed and updated as appropriate: allergies, current medications, past family history, past medical history, past social history, past surgical history and problem list     Review of Systems   Constitutional: Negative for chills, fatigue, fever and unexpected weight change  Cardiovascular: Negative for chest pain and leg swelling  Gastrointestinal: Negative for constipation, diarrhea, nausea and vomiting  Genitourinary: Positive for vaginal bleeding (Normal pp lochia changes describes)  Negative for dysuria, frequency, menstrual problem, pelvic pain and vaginal discharge  Neurological: Negative for headaches  Objective:      /72 (BP Location: Right arm, Patient Position: Sitting, Cuff Size: Standard)   Temp 98 °F (36 7 °C)   Wt 99 4 kg (219 lb 3 2 oz)   LMP 2019 (Exact Date)   Breastfeeding No   BMI 37 63 kg/m²          Physical Exam  Vitals signs and nursing note reviewed  Constitutional:       Appearance: She is well-developed  Cardiovascular:      Rate and Rhythm: Normal rate and regular rhythm  Pulmonary:      Effort: Pulmonary effort is normal       Breath sounds: Normal breath sounds  Abdominal:      Palpations: Abdomen is soft  Tenderness: There is generalized abdominal tenderness  Comments: Well approximated, no drainage, appropriately tender, no redness, soft  Skin:     General: Skin is warm and dry  Neurological:      Mental Status: She is alert and oriented to person, place, and time     Psychiatric:         Behavior: Behavior normal

## 2020-09-14 ENCOUNTER — TELEPHONE (OUTPATIENT)
Dept: OBGYN CLINIC | Facility: CLINIC | Age: 30
End: 2020-09-14

## 2020-09-14 NOTE — TELEPHONE ENCOUNTER
Delivered on 9/3, incision is a  little raised with a bump on lft side, not open or oozing just a little red, no fever

## 2020-09-15 ENCOUNTER — POSTPARTUM VISIT (OUTPATIENT)
Dept: OBGYN CLINIC | Facility: MEDICAL CENTER | Age: 30
End: 2020-09-15

## 2020-09-15 VITALS
SYSTOLIC BLOOD PRESSURE: 120 MMHG | WEIGHT: 209.2 LBS | BODY MASS INDEX: 35.91 KG/M2 | DIASTOLIC BLOOD PRESSURE: 78 MMHG | TEMPERATURE: 97.3 F

## 2020-09-15 PROCEDURE — 99024 POSTOP FOLLOW-UP VISIT: CPT | Performed by: NURSE PRACTITIONER

## 2020-09-15 RX ORDER — CEPHALEXIN 500 MG/1
500 CAPSULE ORAL EVERY 6 HOURS SCHEDULED
Qty: 28 CAPSULE | Refills: 0 | Status: SHIPPED | OUTPATIENT
Start: 2020-09-15 | End: 2020-09-22

## 2020-09-15 NOTE — PROGRESS NOTES
Assessment/Plan:     section wound seroma, postpartum  Incision with serous drainage on left aspect of incision line (states had a bump last night that opened)  Redness noted along lower aspect of left side of incision  Mildly tender to touch  Examined with Dr Rita Wu  Rx given for Keflex 500 Q 6 hours     If has N/V call for Zofran  To call if drainage increases or develops fever/chills or pain  RTO 1 week for recheck     Diagnoses and all orders for this visit:     section wound seroma, postpartum  -     cephalexin (KEFLEX) 500 mg capsule; Take 1 capsule (500 mg total) by mouth every 6 (six) hours for 7 days        Subjective:      Patient ID: Tiffani Retana is a 27 y o  female  Isa Pablo is a 27year old who presents PO day # 12 for incision check  She noticed a bump last night on left side that popped and is now draining yellow liquid  Denies fever/chills/pain  Review of Systems   Constitutional: Negative for chills and fever  Gastrointestinal: Negative for abdominal pain           Objective:      /78 (BP Location: Left arm, Patient Position: Sitting, Cuff Size: Standard)   Temp (!) 97 3 °F (36 3 °C)   Wt 94 9 kg (209 lb 3 2 oz)   LMP 2019 (Exact Date)   BMI 35 91 kg/m²          Physical Exam  Abdominal:

## 2020-09-15 NOTE — ASSESSMENT & PLAN NOTE
Incision with serous drainage on left aspect of incision line (states had a bump last night that opened)  Redness noted along lower aspect of left side of incision  Mildly tender to touch  Examined with Dr Mark Monreal  Rx given for Keflex 500 Q 6 hours     If has N/V call for Zofran  To call if drainage increases or develops fever/chills or pain

## 2020-09-22 ENCOUNTER — POSTPARTUM VISIT (OUTPATIENT)
Dept: OBGYN CLINIC | Facility: MEDICAL CENTER | Age: 30
End: 2020-09-22

## 2020-09-22 VITALS
DIASTOLIC BLOOD PRESSURE: 76 MMHG | BODY MASS INDEX: 34.98 KG/M2 | WEIGHT: 203.8 LBS | TEMPERATURE: 97.7 F | SYSTOLIC BLOOD PRESSURE: 102 MMHG

## 2020-09-22 PROCEDURE — 99024 POSTOP FOLLOW-UP VISIT: CPT | Performed by: NURSE PRACTITIONER

## 2020-09-22 NOTE — ASSESSMENT & PLAN NOTE
PO day # 9 Incision check  Finished ABX today  Incision clean, dry and intact-left edge slightly red C/W keloid formation  No redness noted above or below incision line  [place] scale=4 today  RTO 2 weeks for PP visit and call prn drainage or pain

## 2020-09-22 NOTE — PROGRESS NOTES
Assessment/Plan:     section wound seroma, postpartum  PO day # 19 Incision check  Finished ABX today  Incision clean, dry and intact-left edge slightly red C/W keloid formation  No redness noted above or below incision line  [place] scale=4 today  RTO 2 weeks for PP visit and call prn drainage or pain  Diagnoses and all orders for this visit:     section wound seroma, postpartum        Subjective:      Patient ID: Day Scales is a 27 y o  female  Phil Olivares is a 27year old PO day # 23 who presents for F/U incision check  She denies fever/chills or pain at incision  States it has scant amount of clear discharge sometimes  Has PP exam in 10 days  Review of Systems   Constitutional: Negative for chills and fever  HENT: Negative for congestion and sore throat  Gastrointestinal: Negative for abdominal pain and constipation  Genitourinary: Negative  Psychiatric/Behavioral: Negative  Objective:      /76 (BP Location: Left arm, Patient Position: Sitting, Cuff Size: Large)   Temp 97 7 °F (36 5 °C)   Wt 92 4 kg (203 lb 12 8 oz)   LMP 2019 (Exact Date)   BMI 34 98 kg/m²          Physical Exam  Constitutional:       Appearance: Normal appearance  Pulmonary:      Effort: Pulmonary effort is normal    Abdominal:      General: There is no distension  Palpations: Abdomen is soft  Tenderness: There is no abdominal tenderness  Skin:     General: Skin is warm and dry  Capillary Refill: Capillary refill takes less than 2 seconds  Neurological:      Mental Status: She is alert and oriented to person, place, and time     Psychiatric:         Mood and Affect: Mood normal          Behavior: Behavior normal

## 2020-09-25 ENCOUNTER — TELEPHONE (OUTPATIENT)
Dept: OBGYN CLINIC | Facility: CLINIC | Age: 30
End: 2020-09-25

## 2020-09-25 NOTE — TELEPHONE ENCOUNTER
Likely seroma but unable to diagnose without visualization - please offer appointment either today or Monday morning    Thank you -AMM

## 2020-09-25 NOTE — TELEPHONE ENCOUNTER
Patient calling back to report more fluid build up under C/S scar  (See 9/15 pp visit) Just finished antibiotics  Afebrile  No redness or streaking  Sent to on call provider to address further

## 2020-09-25 NOTE — TELEPHONE ENCOUNTER
Patient unable to make it for appt today  Appt scheduled for Monday in [facility]

## 2020-10-01 ENCOUNTER — POSTPARTUM VISIT (OUTPATIENT)
Dept: OBGYN CLINIC | Facility: CLINIC | Age: 30
End: 2020-10-01

## 2020-10-01 VITALS
SYSTOLIC BLOOD PRESSURE: 118 MMHG | WEIGHT: 204 LBS | BODY MASS INDEX: 35.02 KG/M2 | TEMPERATURE: 97.8 F | DIASTOLIC BLOOD PRESSURE: 80 MMHG

## 2020-10-01 PROCEDURE — 99024 POSTOP FOLLOW-UP VISIT: CPT | Performed by: NURSE PRACTITIONER

## 2020-10-14 ENCOUNTER — POSTPARTUM VISIT (OUTPATIENT)
Dept: OBGYN CLINIC | Age: 30
End: 2020-10-14

## 2020-10-14 VITALS
BODY MASS INDEX: 35.22 KG/M2 | WEIGHT: 205.2 LBS | TEMPERATURE: 96.9 F | DIASTOLIC BLOOD PRESSURE: 76 MMHG | SYSTOLIC BLOOD PRESSURE: 114 MMHG

## 2020-10-14 DIAGNOSIS — Z98.891 S/P REPEAT LOW TRANSVERSE C-SECTION: ICD-10-CM

## 2020-10-14 PROCEDURE — 99024 POSTOP FOLLOW-UP VISIT: CPT | Performed by: NURSE PRACTITIONER

## 2020-11-18 ENCOUNTER — NURSE TRIAGE (OUTPATIENT)
Dept: OTHER | Facility: OTHER | Age: 30
End: 2020-11-18

## 2020-12-21 ENCOUNTER — TELEPHONE (OUTPATIENT)
Dept: OBGYN CLINIC | Facility: CLINIC | Age: 30
End: 2020-12-21

## 2020-12-21 DIAGNOSIS — Z30.41 ENCOUNTER FOR SURVEILLANCE OF CONTRACEPTIVE PILLS: Primary | ICD-10-CM

## 2021-09-08 ENCOUNTER — OFFICE VISIT (OUTPATIENT)
Dept: INTERNAL MEDICINE CLINIC | Facility: CLINIC | Age: 31
End: 2021-09-08
Payer: COMMERCIAL

## 2021-09-08 VITALS
DIASTOLIC BLOOD PRESSURE: 84 MMHG | HEIGHT: 65 IN | SYSTOLIC BLOOD PRESSURE: 124 MMHG | OXYGEN SATURATION: 99 % | HEART RATE: 87 BPM | BODY MASS INDEX: 34.59 KG/M2 | WEIGHT: 207.6 LBS

## 2021-09-08 DIAGNOSIS — E66.9 OBESITY (BMI 30-39.9): ICD-10-CM

## 2021-09-08 DIAGNOSIS — Z11.59 NEED FOR HEPATITIS C SCREENING TEST: ICD-10-CM

## 2021-09-08 DIAGNOSIS — Z13.1 SCREENING FOR DIABETES MELLITUS: ICD-10-CM

## 2021-09-08 DIAGNOSIS — R53.83 FATIGUE, UNSPECIFIED TYPE: ICD-10-CM

## 2021-09-08 DIAGNOSIS — R41.3 MEMORY LOSS: Primary | ICD-10-CM

## 2021-09-08 DIAGNOSIS — Z13.6 SCREENING FOR CARDIOVASCULAR CONDITION: ICD-10-CM

## 2021-09-08 PROBLEM — Z3A.39 39 WEEKS GESTATION OF PREGNANCY: Status: RESOLVED | Noted: 2020-08-16 | Resolved: 2021-09-08

## 2021-09-08 PROBLEM — Z98.891 STATUS POST PRIMARY LOW TRANSVERSE CESAREAN SECTION: Status: RESOLVED | Noted: 2020-09-03 | Resolved: 2021-09-08

## 2021-09-08 PROBLEM — Z98.891 S/P REPEAT LOW TRANSVERSE C-SECTION: Status: RESOLVED | Noted: 2020-09-10 | Resolved: 2021-09-08

## 2021-09-08 PROBLEM — Z3A.40 40 WEEKS GESTATION OF PREGNANCY: Status: RESOLVED | Noted: 2020-09-01 | Resolved: 2021-09-08

## 2021-09-08 PROBLEM — O13.3 GESTATIONAL HYPERTENSION, THIRD TRIMESTER: Status: RESOLVED | Noted: 2020-08-18 | Resolved: 2021-09-08

## 2021-09-08 PROBLEM — O99.213 MATERNAL OBESITY, ANTEPARTUM, THIRD TRIMESTER: Status: RESOLVED | Noted: 2020-02-21 | Resolved: 2021-09-08

## 2021-09-08 PROBLEM — O09.93 HIGH-RISK PREGNANCY IN THIRD TRIMESTER: Status: RESOLVED | Noted: 2020-08-04 | Resolved: 2021-09-08

## 2021-09-08 PROCEDURE — 99214 OFFICE O/P EST MOD 30 MIN: CPT | Performed by: INTERNAL MEDICINE

## 2021-09-08 PROCEDURE — 3008F BODY MASS INDEX DOCD: CPT | Performed by: INTERNAL MEDICINE

## 2021-09-08 NOTE — PROGRESS NOTES
INTERNAL MEDICINE OFFICE VISIT  [facility] AT [address]  Tel: [phone]      NAME: Haily Valencia  AGE: 32 y o  SEX: female  : 1990   MRN: [id number]    DATE: 2021  TIME: 5:00 PM      Assessment and Plan:  1  Memory loss  Patient was told to follow up with Neurology  - Vitamin D 25 hydroxy; Future  - Vitamin B12; Future  - Lyme Total Antibody Profile with reflex to WB; Future  - Ambulatory referral to Neurology; Future    2  Fatigue, unspecified type   will get back to her with the results of the blood work  - CBC and differential; Future  - Comprehensive metabolic panel; Future  - TSH, 3rd generation; Future    3  Obesity (BMI 30-39  9)  BMI Counseling: Body mass index is 34 81 kg/m²  The BMI is above normal  Nutrition recommendations include decreasing portion sizes, encouraging healthy choices of fruits and vegetables and moderation in carbohydrate intake  Exercise recommendations include moderate physical activity 150 minutes/week  No pharmacotherapy was ordered  4  Need for hepatitis C screening test    - Hepatitis C antibody; Future    5  Screening for cardiovascular condition    - Lipid panel; Future    6  Screening for diabetes mellitus    - Hemoglobin A1C; Future      - Counseling Documentation: patient was counseled regarding: instructions for management, risk factor reductions, patient and family education, risks and benefits of treatment options and importance of compliance with treatment  - Medication Side Effects: Adverse side effects of medications were reviewed with the patient/guardian today  Return for follow up visit in  6 months or earlier, if needed  Chief Complaint:  Chief Complaint   Patient presents with   Carmen Bautista Establish Care     discuss memory loss           History of Present Illness:    this is a new patient was here to establish  Patient just had a baby about a year ago and for the last 3 months she and her  have noticed that she is losing her memory  She forgets the conversations between her  and her  She is really concerned about it  She had postpartum depression but denies any depression or anxiety at this time  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  She says she gets enough sleep but still feels she is fatigued  She was told to lose weight and she is aware of it  Active Problem List:  Patient Active Problem List   Diagnosis    Rh negative state in antepartum period    Former smoker    H/O LEEP (loop electrosurgical excision procedure) of cervix complicating pregnancy, first trimester    Obesity (BMI 30-39  9)    Memory loss    Fatigue         Past Medical History:  Past Medical History:   Diagnosis Date    Abnormal Pap smear of cervix     Anxiety     Gestational hypertension, third trimester 2020    Postpartum depression 10/1/2020         Past Surgical History:  Past Surgical History:   Procedure Laterality Date    CERVICAL BIOPSY  W/ LOOP ELECTRODE EXCISION      LA  DELIVERY ONLY N/A 9/3/2020    Procedure:  SECTION ();   Surgeon: Carlyn Crowley MD;  Location: [facility] ;  Service: Obstetrics    WISDOM TOOTH EXTRACTION           Family History:  Family History   Problem Relation Age of Onset    Thyroid disease Mother     Asthma Mother     Thyroid disease Father     Thyroid disease Maternal Grandmother     Alzheimer's disease Maternal Grandfather     Heart disease Paternal Grandmother          Social History:  Social History     Socioeconomic History    Marital status: /Civil Union     Spouse name: None    Number of children: None    Years of education: None    Highest education level: None   Occupational History    None   Tobacco Use    Smoking status: Current Every Day Smoker     Packs/day: 0 50     Years: 10 00     Pack years: 5 00     Types: Cigarettes     Start date: 2005 Last attempt to quit: 2020     Years since quittin 6    Smokeless tobacco: Never Used    Tobacco comment: quit with confirmed preg    Vaping Use    Vaping Use: Never used   Substance and Sexual Activity    Alcohol use: Yes     Alcohol/week: 1 0 standard drinks     Types: 1 Cans of beer per week     Comment: daily-stopped with confirmed preg     Drug use: Never    Sexual activity: Yes     Partners: Male     Comment: Eleni    Other Topics Concern    None   Social History Narrative    None     Social Determinants of Health     Financial Resource Strain:     Difficulty of Paying Living Expenses:    Food Insecurity:     Worried About Running Out of Food in the Last Year:     Ran Out of Food in the Last Year:    Transportation Needs:     Lack of Transportation (Medical):  Lack of Transportation (Non-Medical):    Physical Activity:     Days of Exercise per Week:     Minutes of Exercise per Session:    Stress:     Feeling of Stress :    Social Connections:     Frequency of Communication with Friends and Family:     Frequency of Social Gatherings with Friends and Family:     Attends Latter day Services:     Active Member of Clubs or Organizations:     Attends Club or Organization Meetings:     Marital Status:    Intimate Partner Violence:     Fear of Current or Ex-Partner:     Emotionally Abused:     Physically Abused:     Sexually Abused: Allergies: Allergies   Allergen Reactions    Penicillins Vomiting         Medications:  No current outpatient medications on file        The following portions of the patient's history were reviewed and updated as appropriate: past medical history, past surgical history, family history, social history, allergies, current medications and active problem list       Review of Systems:  Constitutional: Denies fever, chills, weight gain, weight loss, fatigue  Eyes: Denies eye redness, eye discharge, double vision, change in visual acuity  ENT: Denies hearing loss, tinnitus, sneezing, nasal congestion, nasal discharge, sore throat   Respiratory: Denies cough, expectoration, hemoptysis, shortness of breath, wheezing  Cardiovascular: Denies chest pain, palpitations, lower extremity swelling, orthopnea, PND  Gastrointestinal: Denies abdominal pain, heartburn, nausea, vomiting, hematemesis, diarrhea, bloody stools  Genito-Urinary: Denies dysuria, frequency, difficulty in micturition, nocturia, incontinence  Musculoskeletal: Denies back pain, joint pain, muscle pain  Neurologic: Denies confusion, lightheadedness, syncope, headache, focal weakness, sensory changes, seizures   Complains of memory loss  Endocrine: Denies polyuria, polydipsia, temperature intolerance  Allergy and Immunology: Denies hives, insect bite sensitivity  Hematological and Lymphatic: Denies bleeding problems, swollen glands   Psychological: Denies depression, suicidal ideation, anxiety, panic, mood swings  Dermatological: Denies pruritus, rash, skin lesion changes      Vitals:  Vitals:    09/08/21 1644   BP: 124/84   Pulse: 87   SpO2: 99%       Body mass index is 34 81 kg/m²  Weight (last 2 days)     Date/Time   Weight    09/08/21 1644   94 2 (207 6)                Physical Examination:  General: Patient is not in acute distress  Awake, alert, responding to commands  No weight gain or loss  Head: Normocephalic  Atraumatic  Eyes: Conjunctiva and lids with no swelling, erythema or discharge  Both pupils normal sized, round and reactive to light  Sclera nonicteric  ENT: External examination of nose and ear normal  Otoscopic examination shows translucent tympanic membranes with patent canals without erythema  Oropharynx moist with no erythema, edema, exudate or lesions  Neck: Supple  JVP not raised  Trachea midline  No masses  No thyromegaly  Lungs: No signs of increased work of breathing or respiratory distress   Bilateral bronchovascular breath sounds with no crackles or rhonchi  Chest wall: No tenderness  Cardiovascular: Normal PMI  No thrills  Regular rate and rhythm  S1 and S2 normal  No murmur, rub or gallop  Gastrointestinal: Abdomen soft, nontender  No guarding or rigidity  Liver and spleen not palpable  Bowel sounds present  Neurologic: Cranial nerves II-XII intact  Cortical functions normal  Motor system - Reflexes 2+ and symmetrical  Sensations normal  Musculoskeletal: Gait normal  No joint tenderness  Integumentary: Skin normal with no rash or lesions  Lymphatic: No palpable lymph nodes in neck, axilla or groin  Extremities: No clubbing, cyanosis, edema or varicosities  Psychological: Judgement and insight normal  Mood and affect normal      Laboratory Results:  CBC with diff:   Lab Results   Component Value Date    WBC 17 97 (H) 09/04/2020    RBC 2 82 (L) 09/04/2020    HGB 8 8 (L) 09/04/2020    HCT 26 3 (L) 09/04/2020    MCV 93 09/04/2020    MCH 31 2 09/04/2020    RDW 13 2 09/04/2020     09/04/2020       CMP:  Lab Results   Component Value Date    CREATININE 0 51 (L) 09/01/2020    BUN 11 09/01/2020    K 3 7 09/01/2020     09/01/2020    CO2 19 (L) 09/01/2020    ALKPHOS 114 09/01/2020    ALT 26 09/01/2020    AST 17 09/01/2020       No results found for: HGBA1C, MG, PHOS    No results found for: TROPONINI, CKMB, CKTOTAL    Lipid Profile:   No results found for: CHOL  No results found for: HDL  No results found for: LDLCALC  No results found for: TRIG    Imaging Results:  XR ankle 3+ views LEFT  Narrative: LEFT ANKLE    INDICATION:   injury  COMPARISON:  None    VIEWS:  XR ANKLE 3+ VW LEFT     FINDINGS:    There is no acute fracture or dislocation  No significant degenerative changes  No lytic or blastic lesions seen  There is soft tissue swelling over the lateral malleolus  Impression: No acute osseous abnormality      Workstation performed: [id number]       Health Maintenance:  Health Maintenance   Topic Date Due    Hepatitis C Screening  Never done   [employer] Pneumococcal Vaccine: Pediatrics (0 to 5 Years) and At-Risk Patients (6 to 59 Years) (1 of 2 - PPSV23) Never done    COVID-19 Vaccine (1) Never done    BMI: Followup Plan  Never done    Annual Physical  Never done    Influenza Vaccine (1) 09/01/2021    BMI: Adult  10/14/2021    Cervical Cancer Screening  02/20/2023    DTaP,Tdap,and Td Vaccines (2 - Td or Tdap) 06/10/2030    HIV Screening  Completed    HIB Vaccine  Aged Out    Hepatitis B Vaccine  Aged Out    IPV Vaccine  Aged Out    Hepatitis A Vaccine  Aged Out    Meningococcal ACWY Vaccine  Aged Out    HPV Vaccine  Aged Out     Immunization History   Administered Date(s) Administered    Rho (D) Immune Globulin 06/10/2020, 09/04/2020    Tdap 06/10/2020         Kimani Crump MD  9/8/2021,5:00 PM

## 2021-10-12 ENCOUNTER — TELEPHONE (OUTPATIENT)
Dept: NEUROLOGY | Facility: CLINIC | Age: 31
End: 2021-10-12

## 2021-11-12 ENCOUNTER — APPOINTMENT (OUTPATIENT)
Dept: LAB | Facility: CLINIC | Age: 31
End: 2021-11-12
Payer: COMMERCIAL

## 2021-11-12 DIAGNOSIS — R41.3 MEMORY LOSS: ICD-10-CM

## 2021-11-12 DIAGNOSIS — Z11.59 NEED FOR HEPATITIS C SCREENING TEST: ICD-10-CM

## 2021-11-12 DIAGNOSIS — Z13.1 SCREENING FOR DIABETES MELLITUS: ICD-10-CM

## 2021-11-12 DIAGNOSIS — Z13.6 SCREENING FOR CARDIOVASCULAR CONDITION: ICD-10-CM

## 2021-11-12 DIAGNOSIS — R53.83 FATIGUE, UNSPECIFIED TYPE: ICD-10-CM

## 2021-11-12 LAB
25(OH)D3 SERPL-MCNC: 13.8 NG/ML (ref 30–100)
ALBUMIN SERPL BCP-MCNC: 3.8 G/DL (ref 3.5–5)
ALP SERPL-CCNC: 59 U/L (ref 46–116)
ALT SERPL W P-5'-P-CCNC: 23 U/L (ref 12–78)
ANION GAP SERPL CALCULATED.3IONS-SCNC: 5 MMOL/L (ref 4–13)
AST SERPL W P-5'-P-CCNC: 12 U/L (ref 5–45)
BASOPHILS # BLD AUTO: 0.07 THOUSANDS/ΜL (ref 0–0.1)
BASOPHILS NFR BLD AUTO: 1 % (ref 0–1)
BILIRUB SERPL-MCNC: 1.26 MG/DL (ref 0.2–1)
BUN SERPL-MCNC: 13 MG/DL (ref 5–25)
CALCIUM SERPL-MCNC: 9.3 MG/DL (ref 8.3–10.1)
CHLORIDE SERPL-SCNC: 110 MMOL/L (ref 100–108)
CHOLEST SERPL-MCNC: 124 MG/DL (ref 50–200)
CO2 SERPL-SCNC: 23 MMOL/L (ref 21–32)
CREAT SERPL-MCNC: 0.83 MG/DL (ref 0.6–1.3)
EOSINOPHIL # BLD AUTO: 0.3 THOUSAND/ΜL (ref 0–0.61)
EOSINOPHIL NFR BLD AUTO: 4 % (ref 0–6)
ERYTHROCYTE [DISTWIDTH] IN BLOOD BY AUTOMATED COUNT: 12.8 % (ref 11.6–15.1)
EST. AVERAGE GLUCOSE BLD GHB EST-MCNC: 94 MG/DL
GFR SERPL CREATININE-BSD FRML MDRD: 94 ML/MIN/1.73SQ M
GLUCOSE P FAST SERPL-MCNC: 94 MG/DL (ref 65–99)
HBA1C MFR BLD: 4.9 %
HCT VFR BLD AUTO: 42.9 % (ref 34.8–46.1)
HCV AB SER QL: NORMAL
HDLC SERPL-MCNC: 59 MG/DL
HGB BLD-MCNC: 14.5 G/DL (ref 11.5–15.4)
IMM GRANULOCYTES # BLD AUTO: 0.02 THOUSAND/UL (ref 0–0.2)
IMM GRANULOCYTES NFR BLD AUTO: 0 % (ref 0–2)
LDLC SERPL CALC-MCNC: 50 MG/DL (ref 0–100)
LYMPHOCYTES # BLD AUTO: 1.99 THOUSANDS/ΜL (ref 0.6–4.47)
LYMPHOCYTES NFR BLD AUTO: 23 % (ref 14–44)
MCH RBC QN AUTO: 30.3 PG (ref 26.8–34.3)
MCHC RBC AUTO-ENTMCNC: 33.8 G/DL (ref 31.4–37.4)
MCV RBC AUTO: 90 FL (ref 82–98)
MONOCYTES # BLD AUTO: 0.55 THOUSAND/ΜL (ref 0.17–1.22)
MONOCYTES NFR BLD AUTO: 6 % (ref 4–12)
NEUTROPHILS # BLD AUTO: 5.66 THOUSANDS/ΜL (ref 1.85–7.62)
NEUTS SEG NFR BLD AUTO: 66 % (ref 43–75)
NONHDLC SERPL-MCNC: 65 MG/DL
NRBC BLD AUTO-RTO: 0 /100 WBCS
PLATELET # BLD AUTO: 308 THOUSANDS/UL (ref 149–390)
PMV BLD AUTO: 10.4 FL (ref 8.9–12.7)
POTASSIUM SERPL-SCNC: 3.8 MMOL/L (ref 3.5–5.3)
PROT SERPL-MCNC: 7.4 G/DL (ref 6.4–8.2)
RBC # BLD AUTO: 4.78 MILLION/UL (ref 3.81–5.12)
SODIUM SERPL-SCNC: 138 MMOL/L (ref 136–145)
TRIGL SERPL-MCNC: 73 MG/DL
TSH SERPL DL<=0.05 MIU/L-ACNC: 1.31 UIU/ML (ref 0.36–3.74)
VIT B12 SERPL-MCNC: 629 PG/ML (ref 100–900)
WBC # BLD AUTO: 8.59 THOUSAND/UL (ref 4.31–10.16)

## 2021-11-12 PROCEDURE — 82607 VITAMIN B-12: CPT

## 2021-11-12 PROCEDURE — 82306 VITAMIN D 25 HYDROXY: CPT

## 2021-11-12 PROCEDURE — 84443 ASSAY THYROID STIM HORMONE: CPT

## 2021-11-12 PROCEDURE — 80061 LIPID PANEL: CPT

## 2021-11-12 PROCEDURE — 36415 COLL VENOUS BLD VENIPUNCTURE: CPT

## 2021-11-12 PROCEDURE — 80053 COMPREHEN METABOLIC PANEL: CPT

## 2021-11-12 PROCEDURE — 86803 HEPATITIS C AB TEST: CPT

## 2021-11-12 PROCEDURE — 83036 HEMOGLOBIN GLYCOSYLATED A1C: CPT

## 2021-11-12 PROCEDURE — 85025 COMPLETE CBC W/AUTO DIFF WBC: CPT

## 2021-11-12 PROCEDURE — 86618 LYME DISEASE ANTIBODY: CPT

## 2021-11-13 LAB — B BURGDOR IGG+IGM SER-ACNC: 74

## 2022-02-04 ENCOUNTER — TELEPHONE (OUTPATIENT)
Dept: NEUROLOGY | Facility: CLINIC | Age: 32
End: 2022-02-04

## 2022-02-04 NOTE — TELEPHONE ENCOUNTER
Called patient left voice mail letting her know due to the inclement weather the office will be closing around 1 pm today  If she can please convert her visit to a Virtual Visit or she prefer to come in earlier than 1

## 2022-11-15 NOTE — PROGRESS NOTES
Assessment/Plan:    No problem-specific Assessment & Plan notes found for this encounter  Diagnoses and all orders for this visit:    Encounter for initial prescription of contraceptive pills  -     norethindrone-ethinyl estradiol-ferrous fumarate (Junel Fe 24) 1-20 MG-MCG(24) per tablet; Take 1 tablet by mouth daily      reviewed benefits, risks, side effects and instructions for use of OCPs  Encourage starting prescription on this coming   Will follow-up in 3 months and let me know if this is working out for her  Family and personal history reviewed for risk of VTE  Reviewed ACHES    Subjective:      Patient ID: Kathia eVlez is a 28 y o  female  27 yo female , presents to discuss BC options, she would like to restart OCPs  She has been on OCPs in the past and had no difficulties  Patient is a smoker  3-5 cigarettes daily patient is currently trying to quit, has an appointment this week with her primary care to discuss options for smoking cessation  Personal and family history reviewed for risks of VTE patient denies having any risk factors,  Sexually active with the same partner, they are unsure at this time whether they would like to have another child      The following portions of the patient's history were reviewed and updated as appropriate: allergies, current medications, past family history, past medical history, past social history, past surgical history and problem list     Review of Systems   Constitutional: Negative for chills, fatigue and fever  Eyes: Negative for visual disturbance  Respiratory: Negative for cough and shortness of breath  Cardiovascular: Negative for chest pain  Gastrointestinal: Negative for abdominal pain  Genitourinary: Negative for vaginal bleeding and vaginal discharge           Objective:      /80 (BP Location: Left arm, Patient Position: Sitting, Cuff Size: Large)   Ht 5' 4" (1 626 m)   Wt 94 3 kg (208 lb)   LMP 10/15/2022 (Approximate)   BMI 35 70 kg/m²          Physical Exam  Vitals and nursing note reviewed  Constitutional:       Appearance: Normal appearance  She is normal weight  HENT:      Head: Normocephalic and atraumatic  Eyes:      Conjunctiva/sclera: Conjunctivae normal    Cardiovascular:      Rate and Rhythm: Normal rate  Pulmonary:      Effort: Pulmonary effort is normal    Musculoskeletal:         General: Normal range of motion  Cervical back: Normal range of motion  Skin:     General: Skin is warm and dry  Neurological:      Mental Status: She is alert  Psychiatric:         Mood and Affect: Mood normal          Behavior: Behavior normal          Thought Content:  Thought content normal          Judgment: Judgment normal

## 2022-11-16 ENCOUNTER — OFFICE VISIT (OUTPATIENT)
Dept: OBGYN CLINIC | Facility: CLINIC | Age: 32
End: 2022-11-16

## 2022-11-16 VITALS
WEIGHT: 208 LBS | BODY MASS INDEX: 35.51 KG/M2 | HEIGHT: 64 IN | DIASTOLIC BLOOD PRESSURE: 80 MMHG | SYSTOLIC BLOOD PRESSURE: 122 MMHG

## 2022-11-16 DIAGNOSIS — Z30.011 ENCOUNTER FOR INITIAL PRESCRIPTION OF CONTRACEPTIVE PILLS: Primary | ICD-10-CM

## 2022-11-16 RX ORDER — NORETHINDRONE ACETATE AND ETHINYL ESTRADIOL AND FERROUS FUMARATE 1MG-20(24)
1 KIT ORAL DAILY
Qty: 90 TABLET | Refills: 0 | Status: SHIPPED | OUTPATIENT
Start: 2022-11-16

## 2022-11-16 NOTE — PATIENT INSTRUCTIONS
Birth Control Pills     Birth control pills  are also called oral contraceptives, or the pill  It is medicine that helps prevent pregnancy by stopping ovulation  Ovulation is when the ovaries make and release an egg cell each month  If this egg gets fertilized by sperm, pregnancy occurs  You will need to take the pill at the same time every day  Your healthcare provider will tell you when to start taking the pill  You will also be told what to do if you miss a dose  Instructions will depend on the kind of birth control pills you are taking  Different kinds of birth control pills:  Some kinds are taken for 21 days in a row, followed by 7 days of placebo (no hormones) pills  Other kinds are taken for 24 days followed by 4 days of placebos  Each kind has a certain amount of female hormones  Your provider will decide on the kind that is best for you based on your age and other health conditions  Call your local emergency number (911 in the [address] [place] [address],[address]) for any of the following: You have any of the following signs of a stroke:      Numbness or drooping on one side of your face     Weakness in an arm or leg    Confusion or difficulty speaking    Dizziness, a severe headache, or vision loss    You feel lightheaded, short of breath, and have chest pain  You cough up blood  Seek care immediately if:   Your arm or leg feels warm, tender, and painful  It may look swollen and red  You have severe pain, numbness, or swelling in your arms or legs  Contact your healthcare provider if:   You have forgotten to take a birth control pill  You have mood changes, such as depression, since starting birth control pills  You have nausea or are vomiting  You have severe abdominal pain  You missed a period and have questions or concerns about being pregnant  You still have bleeding 4 months after taking birth control pills correctly  You have questions or concerns about your condition or care      What may be done before you can start taking birth control pills: You need to see your healthcare provider to get a prescription  Any of the following may be done before your healthcare provider gives you a prescription:  Your healthcare provider will ask about diseases and illnesses you have had in the past  Your provider will check your risk for blood clots, heart conditions, or stroke  Tell your provider if you had gastric bypass surgery  This surgery can affect the way your body absorbs medicines such as birth control pills  Your provider will also check your blood pressure, and may do a breast and pelvic exam  A Pap smear may also be done during the pelvic exam  This is a test to make sure you do not have abnormal changes on your cervix  You may need other tests, such as a urine test to make sure you are not pregnant  Your provider will ask if you take any medicines and if you smoke  Smoking increases your risk for stroke, heart attack, or a blood clot in your lungs  If you smoke, you should not take certain kinds of birth control pills  Advantages of birth control pills:  When birth control pills are used correctly, the chances of getting pregnant are very low  Birth control pills may help decrease bleeding and pain during your monthly period  They may also help prevent cancer of the uterus and ovaries  Disadvantages of birth control pills: You may have sudden changes in your mood or feelings while you take birth control pills  You may have nausea and a decreased sex drive  You may have an increased appetite and rapid weight gain  You may also have bleeding in between periods, less frequent periods, vaginal dryness, and breast pain  Birth control pills will not protect you from sexually transmitted infections  Rarely, some birth control pills can increase your risk for a blood clot  This may become life-threatening  If you decide you want to get pregnant:   If you are planning to have a baby, ask your healthcare provider when you may stop taking your birth control pills  It may take some time for you to start ovulating again  Ask your healthcare provider for more information about pregnancy after birth control pills  When to start taking birth control pills after you have a baby: If you are not breastfeeding, you may start taking birth control pills 3 weeks after you give birth  You may be able to take certain types of birth control pills if you are breastfeeding  These pills can be started from 6 weeks to 6 months after you give birth  Ask your healthcare provider for more information about when to start taking birth control pills after you give birth  What you need to know about birth control pills and menopause:   Talk with your healthcare provider if you want to take birth control pills around menopause  Around age 39, you will enter into perimenopause  This means your hormone levels are dropping and you are ovulating less often  You can still become pregnant during this time  The risk for problems, such as miscarriage, are higher if you become pregnant after age 39  Birth control pills will prevent pregnancy, and may also help prevent or relieve some signs and symptoms of menopause  Examples are hot flashes and mood swings  Your provider will do tests when you are around age 48  The tests may show that you are in menopause  If the tests do not show menopause for sure, you may be able to continue taking the pill up to age 54  The decision will depend on your health and if you have any medical conditions, such as a blood clot  Do not smoke:  Nicotine and other chemicals in cigarettes and cigars increase your risk for a blood clot while you use birth control pills  The risk is higher if you are also 35 or older  Ask your healthcare provider for information if you currently smoke and need help to quit  E-cigarettes or smokeless tobacco still contain nicotine   Talk to your healthcare provider before you use these products  Follow up with your healthcare provider as directed:  Write down your questions so you remember to ask them during your visits  © Copyright [address] Information is for End User's use only and may not be sold, redistributed or otherwise used for commercial purposes  All illustrations and images included in CareNotes® are the copyrighted property of A D A M [employer]  or Yusra Vázquez   The above information is an  only  It is not intended as medical advice for individual conditions or treatments  Talk to your doctor, nurse or pharmacist before following any medical regimen to see if it is safe and effective for you

## 2022-11-18 ENCOUNTER — OFFICE VISIT (OUTPATIENT)
Dept: FAMILY MEDICINE CLINIC | Facility: CLINIC | Age: 32
End: 2022-11-18

## 2022-11-18 VITALS
OXYGEN SATURATION: 99 % | BODY MASS INDEX: 36.12 KG/M2 | TEMPERATURE: 98.8 F | SYSTOLIC BLOOD PRESSURE: 120 MMHG | DIASTOLIC BLOOD PRESSURE: 80 MMHG | HEART RATE: 84 BPM | RESPIRATION RATE: 14 BRPM | WEIGHT: 211.6 LBS | HEIGHT: 64 IN

## 2022-11-18 DIAGNOSIS — M54.50 CHRONIC RIGHT-SIDED LOW BACK PAIN, UNSPECIFIED WHETHER SCIATICA PRESENT: Primary | ICD-10-CM

## 2022-11-18 DIAGNOSIS — G89.29 CHRONIC RIGHT-SIDED LOW BACK PAIN, UNSPECIFIED WHETHER SCIATICA PRESENT: Primary | ICD-10-CM

## 2022-11-18 DIAGNOSIS — E28.2 PCOS (POLYCYSTIC OVARIAN SYNDROME): ICD-10-CM

## 2022-11-18 PROBLEM — G43.019 INTRACTABLE MIGRAINE WITHOUT AURA AND WITHOUT STATUS MIGRAINOSUS: Status: ACTIVE | Noted: 2022-11-18

## 2022-11-18 NOTE — ASSESSMENT & PLAN NOTE
Please have x-rays and start home physical therapy  Referral placed to spine pain  Suggest ice  Patient does not want to take any medication at this time  Suggest glucosamine chondroitin, turmeric, cherry extract

## 2022-11-18 NOTE — PROGRESS NOTES
Assessment/Plan:         Problem List Items Addressed This Visit        Endocrine    PCOS (polycystic ovarian syndrome)     Will start metformin twice daily  Follow-up in 1 month  Relevant Medications    metFORMIN (GLUCOPHAGE) 500 mg tablet       Other    Chronic right-sided low back pain - Primary     Please have x-rays and start home physical therapy  Referral placed to spine pain  Suggest ice  Patient does not want to take any medication at this time  Suggest glucosamine chondroitin, turmeric, cherry extract  Relevant Orders    XR sacrum and coccyx    XR sacroiliac joints 3+ views    XR spine lumbar minimum 4 views non injury    Ambulatory Referral to Comprehensive Spine Program         Subjective:      Patient ID: Olivia Gann is a 28 y o  female  J Luis Rocha is here to establish, she has not yet started birth control, she always puts on weight and makes her zuniga  She has one child, a little girl who is 2  She has had back pain since she had her daughter  The back pain started in her lower right area when she was 6 months pregnant, it gets so bad that if she tries to walk she collapses, it lasts about an hour or 2  She has the pain about twice a week  There is no associated tingling and numbness  The pain starts around the right si joint and shoots all the way down the right leg and up her back  She has tried compresses hot/cold and advil  The following portions of the patient's history were reviewed and updated as appropriate:   Past Medical History:  She has a past medical history of Abnormal Pap smear of cervix (2015), Anxiety, Gestational hypertension, third trimester (8/18/2020), and Postpartum depression (10/1/2020)  ,  _______________________________________________________________________  Medical Problems:  does not have any pertinent problems on file ,  _______________________________________________________________________  Past Surgical History:   has a past surgical history that includes Cervical biopsy w/ loop electrode excision (); [place] tooth extraction (); and pr  delivery only (N/A, 9/3/2020)  ,  _______________________________________________________________________  Family History:  family history includes Alzheimer's disease in her maternal grandfather; Asthma in her mother; Heart disease in her paternal grandmother; Thyroid disease in her father, maternal grandmother, and mother ,  _______________________________________________________________________  Social History:   reports that she has been smoking cigarettes  She started smoking about 17 years ago  She has a 5 00 pack-year smoking history  She has never used smokeless tobacco  She reports current alcohol use of about 1 0 standard drink per week  She reports that she does not use drugs  ,  _______________________________________________________________________  Allergies:  is allergic to penicillins     _______________________________________________________________________  Current Outpatient Medications   Medication Sig Dispense Refill   • metFORMIN (GLUCOPHAGE) 500 mg tablet Take 1 tablet (500 mg total) by mouth 2 (two) times a day with meals 60 tablet 1   • norethindrone-ethinyl estradiol-ferrous fumarate (Junel Fe 24) 1-20 MG-MCG(24) per tablet Take 1 tablet by mouth daily 90 tablet 0     No current facility-administered medications for this visit      _______________________________________________________________________  Review of Systems   Constitutional: Negative for chills, diaphoresis, fatigue and fever  HENT: Negative for congestion, ear pain, sinus pressure, sinus pain and sore throat  Eyes: Negative for pain and visual disturbance  Respiratory: Negative for cough, shortness of breath and wheezing  Cardiovascular: Negative for chest pain and palpitations  Gastrointestinal: Negative for abdominal pain, constipation, diarrhea, nausea and vomiting     Genitourinary: Negative for dysuria, frequency, hematuria and urgency  LMP 10/11/22   Musculoskeletal: Positive for back pain  Negative for arthralgias and myalgias  Skin: Negative for color change and rash  Neurological: Negative for dizziness, seizures, syncope, light-headedness and headaches  Psychiatric/Behavioral: Negative for dysphoric mood and sleep disturbance  The patient is nervous/anxious  All other systems reviewed and are negative  Objective:  Vitals:    11/18/22 1355   BP: 120/80   Pulse: 84   Resp: 14   Temp: 98 8 °F (37 1 °C)   SpO2: 99%   Weight: 96 kg (211 lb 9 6 oz)   Height: 5' 4" (1 626 m)     Body mass index is 36 32 kg/m²  Physical Exam  Vitals and nursing note reviewed  Constitutional:       Appearance: Normal appearance  She is not ill-appearing  HENT:      Head: Normocephalic  Right Ear: Tympanic membrane, ear canal and external ear normal  There is no impacted cerumen  Left Ear: Tympanic membrane, ear canal and external ear normal  There is no impacted cerumen  Nose: Nose normal  No congestion  Mouth/Throat:      Mouth: Mucous membranes are moist       Pharynx: No posterior oropharyngeal erythema  Eyes:      Extraocular Movements: Extraocular movements intact  Cardiovascular:      Rate and Rhythm: Normal rate and regular rhythm  Heart sounds: Normal heart sounds  No murmur heard  Pulmonary:      Effort: Pulmonary effort is normal       Breath sounds: Normal breath sounds  No wheezing  Abdominal:      Palpations: Abdomen is soft  Tenderness: There is no abdominal tenderness  Musculoskeletal:         General: Normal range of motion  Cervical back: Normal range of motion  Right lower leg: No edema  Left lower leg: No edema  Skin:     General: Skin is warm and dry  Neurological:      General: No focal deficit present  Mental Status: She is alert     Psychiatric:         Mood and Affect: Mood normal          Behavior: Behavior normal

## 2022-11-18 NOTE — PATIENT INSTRUCTIONS
Glucosamine chondroitin three times a day, cherry extract turmeric daily  Magnesium 500 mg vitamin D3 4000 units at night time

## 2022-11-25 ENCOUNTER — HOSPITAL ENCOUNTER (OUTPATIENT)
Dept: RADIOLOGY | Facility: HOSPITAL | Age: 32
End: 2022-11-25

## 2022-11-25 DIAGNOSIS — M54.50 CHRONIC RIGHT-SIDED LOW BACK PAIN, UNSPECIFIED WHETHER SCIATICA PRESENT: ICD-10-CM

## 2022-11-25 DIAGNOSIS — G89.29 CHRONIC RIGHT-SIDED LOW BACK PAIN, UNSPECIFIED WHETHER SCIATICA PRESENT: ICD-10-CM

## 2022-11-29 ENCOUNTER — TELEPHONE (OUTPATIENT)
Dept: FAMILY MEDICINE CLINIC | Facility: CLINIC | Age: 32
End: 2022-11-29

## 2022-11-29 NOTE — TELEPHONE ENCOUNTER
----- Message from Bonita Sommers, [address] sent at 11/28/2022 12:21 PM EST -----  X-rays all look normal   Did she want to see Ortho? I would really like her to do physical therapy

## 2022-12-19 DIAGNOSIS — H72.92 PERFORATION OF LEFT TYMPANIC MEMBRANE: Primary | ICD-10-CM

## 2022-12-19 RX ORDER — CIPROFLOXACIN AND DEXAMETHASONE 3; 1 MG/ML; MG/ML
4 SUSPENSION/ DROPS AURICULAR (OTIC) 2 TIMES DAILY
Qty: 7.5 ML | Refills: 0 | Status: SHIPPED | OUTPATIENT
Start: 2022-12-19 | End: 2023-05-10

## 2023-03-14 DIAGNOSIS — E66.9 OBESITY (BMI 30-39.9): Primary | ICD-10-CM

## 2023-03-14 RX ORDER — PHENTERMINE HYDROCHLORIDE 15 MG/1
15 CAPSULE ORAL EVERY MORNING
Qty: 30 CAPSULE | Refills: 0 | Status: SHIPPED | OUTPATIENT
Start: 2023-03-14 | End: 2023-05-10 | Stop reason: SDUPTHER

## 2023-05-10 ENCOUNTER — OFFICE VISIT (OUTPATIENT)
Dept: FAMILY MEDICINE CLINIC | Facility: CLINIC | Age: 33
End: 2023-05-10

## 2023-05-10 VITALS
HEART RATE: 89 BPM | DIASTOLIC BLOOD PRESSURE: 70 MMHG | HEIGHT: 64 IN | OXYGEN SATURATION: 97 % | WEIGHT: 206 LBS | SYSTOLIC BLOOD PRESSURE: 116 MMHG | BODY MASS INDEX: 35.17 KG/M2

## 2023-05-10 DIAGNOSIS — Z00.00 ANNUAL PHYSICAL EXAM: ICD-10-CM

## 2023-05-10 DIAGNOSIS — E66.9 OBESITY (BMI 30-39.9): ICD-10-CM

## 2023-05-10 DIAGNOSIS — F17.210 CIGARETTE NICOTINE DEPENDENCE WITHOUT COMPLICATION: ICD-10-CM

## 2023-05-10 DIAGNOSIS — Z13.6 SCREENING FOR CARDIOVASCULAR CONDITION: ICD-10-CM

## 2023-05-10 DIAGNOSIS — G43.019 INTRACTABLE MIGRAINE WITHOUT AURA AND WITHOUT STATUS MIGRAINOSUS: ICD-10-CM

## 2023-05-10 DIAGNOSIS — N91.2 AMENORRHEA: Primary | ICD-10-CM

## 2023-05-10 RX ORDER — PHENTERMINE HYDROCHLORIDE 15 MG/1
15 CAPSULE ORAL EVERY MORNING
Qty: 30 CAPSULE | Refills: 0 | Status: SHIPPED | OUTPATIENT
Start: 2023-05-10 | End: 2023-06-09

## 2023-05-10 RX ORDER — TOPIRAMATE 50 MG/1
50 TABLET, FILM COATED ORAL
Qty: 30 TABLET | Refills: 1 | Status: SHIPPED | OUTPATIENT
Start: 2023-05-10

## 2023-05-10 RX ORDER — NICOTINE 21 MG/24HR
1 PATCH, TRANSDERMAL 24 HOURS TRANSDERMAL EVERY 24 HOURS
Qty: 28 PATCH | Refills: 0 | Status: SHIPPED | OUTPATIENT
Start: 2023-05-10

## 2023-05-10 NOTE — PROGRESS NOTES
[facility] [address] [facility]    NAME: Alyson Stoddard  AGE: 35 y o  SEX: female  : 1990     DATE: 5/10/2023     Assessment and Plan:     Problem List Items Addressed This Visit        Cardiovascular and Mediastinum    Intractable migraine without aura and without status migrainosus     Will start Topamax nightly, follow up in 1 month  Call with any side effects  Relevant Medications    topiramate (Topamax) 50 MG tablet       Other    Obesity (BMI 30-39  9)       Suggest my fitness Pal   Reduce daily calories by 300 calories a day  Increase activity to 150 minutes a week  Suggest lean protein high fiber diet  Eats small portions every 3 hours  5-9 fruits and vegetables a day  Do not drink sugary drinks  Continue phentermine daily, follow up in 1 month  Great job loosing 6 lbs  Relevant Medications    phentermine 15 MG capsule    Other Relevant Orders    Lipid panel    Hemoglobin A1C    Comprehensive metabolic panel    CBC and Platelet    TSH, 3rd generation with Free T4 reflex    Vitamin D 25 hydroxy    Cigarette nicotine dependence without complication     Suggest Nicoderm, follow up in 1 month  Strongly recommend quitting  Relevant Medications    nicotine (NICODERM CQ) 14 mg/24hr TD 24 hr patch   Other Visit Diagnoses     Amenorrhea    -  Primary    HCG testing ordered, will call with results  Relevant Orders    hCG, quantitative    Screening for cardiovascular condition        Have lab work, will call with results    Relevant Orders    Lipid panel    Hemoglobin A1C    Comprehensive metabolic panel    CBC and Platelet    TSH, 3rd generation with Free T4 reflex    Annual physical exam        Have lab work, follow up in 1 month      Relevant Orders    Lipid panel    Hemoglobin A1C    Comprehensive metabolic panel    CBC and Platelet    TSH, 3rd generation with Free T4 reflex    Vitamin D 25 hydroxy          Immunizations and preventive care screenings were discussed with patient today  Appropriate education was printed on patient's after visit summary  Counseling:  Alcohol/drug use: discussed moderation in alcohol intake, the recommendations for healthy alcohol use, and avoidance of illicit drug use  Dental Health: discussed importance of regular tooth brushing, flossing, and dental visits  Injury prevention: discussed safety/seat belts, safety helmets, smoke detectors, carbon dioxide detectors, and smoking near bedding or upholstery  Sexual health: discussed sexually transmitted diseases, partner selection, use of condoms, avoidance of unintended pregnancy, and contraceptive alternatives  Exercise: the importance of regular exercise/physical activity was discussed  Recommend exercise 3-5 times per week for at least 30 minutes  BMI Counseling: Body mass index is 35 36 kg/m²  The BMI is above normal  Nutrition recommendations include decreasing portion sizes, encouraging healthy choices of fruits and vegetables, increasing intake of lean protein and reducing intake of cholesterol  Exercise recommendations include exercising 3-5 times per week  Rationale for BMI follow-up plan is due to patient being overweight or obese  Return in 4 weeks (on 6/7/2023)  Chief Complaint:     Chief Complaint   Patient presents with   • Physical Exam   • Medication Management      History of Present Illness:     Adult Annual Physical   Patient here for a comprehensive physical exam  The patient reports problems - back pain  Diet and Physical Activity  Diet/Nutrition: well balanced diet and limited fruits/vegetables  Exercise: walking  Depression Screening  PHQ-2/9 Depression Screening    Little interest or pleasure in doing things: 0 - not at all  Feeling down, depressed, or hopeless: 0 - not at all       General Health  Sleep: gets 4-6 hours of sleep on average     Hearing: normal - bilateral   Vision: most recent eye exam <1 year ago and wears glasses  Dental: regular dental visits, brushes teeth twice daily and flosses teeth occasionally  /GYN Health  Last menstrual period: 23  Contraceptive method: none  History of STDs?: no      Review of Systems:     Review of Systems   Constitutional: Negative for chills, diaphoresis, fatigue and fever  HENT: Positive for congestion and postnasal drip  Negative for ear pain, sinus pressure, sinus pain and sore throat  Eyes: Negative for pain and visual disturbance  Respiratory: Negative for cough, chest tightness, shortness of breath and wheezing  Cardiovascular: Negative for chest pain and palpitations  Gastrointestinal: Positive for nausea and vomiting  Negative for abdominal pain, constipation and diarrhea  Genitourinary: Negative for dysuria, frequency, hematuria and urgency  Musculoskeletal: Positive for back pain  Negative for arthralgias and myalgias  Skin: Negative for color change and rash  Neurological: Negative for dizziness, seizures, syncope, light-headedness and headaches  Psychiatric/Behavioral: Negative for dysphoric mood and sleep disturbance  The patient is nervous/anxious  All other systems reviewed and are negative  Past Medical History:     Past Medical History:   Diagnosis Date   • Abnormal Pap smear of cervix    • Anxiety    • Gestational hypertension, third trimester 2020   • Postpartum depression 10/1/2020      Past Surgical History:     Past Surgical History:   Procedure Laterality Date   • CERVICAL BIOPSY  W/ LOOP ELECTRODE EXCISION     • [place]  DELIVERY ONLY N/A 9/3/2020    Procedure:  SECTION ();   Surgeon: Yolanda Clark MD;  Location: [facility];  Service: Obstetrics   • WISDOM TOOTH EXTRACTION        Social History:     Social History     Socioeconomic History   • Marital status: /Civil Union     Spouse name: None   • Number of children: None   • Years of education: None   • Highest education level: None   Occupational History   • None   Tobacco Use   • Smoking status: Every Day     Packs/day: 0 50     Years: 10 00     Pack years: 5 00     Types: Cigarettes     Start date: 2/6/2005     Last attempt to quit: 1/2/2020     Years since quitting: 3 3   • Smokeless tobacco: Never   • Tobacco comments:     quit with confirmed preg    Vaping Use   • Vaping Use: Never used   Substance and Sexual Activity   • Alcohol use: Yes     Alcohol/week: 1 0 standard drink     Types: 1 Cans of beer per week     Comment: daily-stopped with confirmed preg    • Drug use: Never   • Sexual activity: Yes     Partners: Male     Birth control/protection: None     Comment: Eleni    Other Topics Concern   • None   Social History Narrative   • None     Social Determinants of Health     Financial Resource Strain: Not on file   Food Insecurity: Not on file   Transportation Needs: Not on file   Physical Activity: Not on file   Stress: Not on file   Social Connections: Not on file   Intimate Partner Violence: Not on file   Housing Stability: Not on file      Family History:     Family History   Problem Relation Age of Onset   • Thyroid disease Mother    • Asthma Mother    • Thyroid disease Father    • Thyroid disease Maternal Grandmother    • Alzheimer's disease Maternal Grandfather    • Heart disease Paternal Grandmother       Current Medications:     Current Outpatient Medications   Medication Sig Dispense Refill   • nicotine (NICODERM CQ) 14 mg/24hr TD 24 hr patch Place 1 patch on the skin over 24 hours every 24 hours 28 patch 0   • phentermine 15 MG capsule Take 1 capsule (15 mg total) by mouth every morning 30 capsule 0   • topiramate (Topamax) 50 MG tablet Take 1 tablet (50 mg total) by mouth daily at bedtime 30 tablet 1     No current facility-administered medications for this visit  Allergies:      Allergies   Allergen Reactions   • Penicillins Vomiting "Physical Exam:     /70   Pulse 89   Ht 5' 4\" (1 626 m)   Wt 93 4 kg (206 lb)   SpO2 97%   BMI 35 36 kg/m²     Physical Exam  Vitals and nursing note reviewed  Constitutional:       General: She is not in acute distress  Appearance: Normal appearance  She is well-developed  She is obese  She is not ill-appearing  HENT:      Head: Normocephalic and atraumatic  Right Ear: Tympanic membrane, ear canal and external ear normal  There is no impacted cerumen  Left Ear: Tympanic membrane, ear canal and external ear normal  There is no impacted cerumen  Nose: Nose normal  No congestion  Mouth/Throat:      Mouth: Mucous membranes are moist       Pharynx: No posterior oropharyngeal erythema  Eyes:      Extraocular Movements: Extraocular movements intact  Conjunctiva/sclera: Conjunctivae normal       Pupils: Pupils are equal, round, and reactive to light  Cardiovascular:      Rate and Rhythm: Normal rate and regular rhythm  Heart sounds: No murmur heard  Pulmonary:      Effort: Pulmonary effort is normal  No respiratory distress  Breath sounds: Normal breath sounds  Abdominal:      Palpations: Abdomen is soft  Tenderness: There is no abdominal tenderness  Musculoskeletal:         General: No swelling  Cervical back: Normal range of motion and neck supple  Right lower leg: No edema  Left lower leg: No edema  Lymphadenopathy:      Cervical: No cervical adenopathy  Skin:     General: Skin is warm and dry  Capillary Refill: Capillary refill takes less than 2 seconds  Neurological:      General: No focal deficit present  Mental Status: She is alert     Psychiatric:         Mood and Affect: Mood normal          Behavior: Behavior normal           Taylor Fiore, [address] [place] [address]"

## 2023-05-10 NOTE — ASSESSMENT & PLAN NOTE
Suggest my fitness Pal   Reduce daily calories by 300 calories a day  Increase activity to 150 minutes a week  Suggest lean protein high fiber diet  Eats small portions every 3 hours  5-9 fruits and vegetables a day  Do not drink sugary drinks  Continue phentermine daily, follow up in 1 month  Great job loosing 6 lbs

## 2023-05-11 ENCOUNTER — APPOINTMENT (OUTPATIENT)
Age: 33
End: 2023-05-11

## 2023-05-11 DIAGNOSIS — Z13.6 SCREENING FOR CARDIOVASCULAR CONDITION: ICD-10-CM

## 2023-05-11 DIAGNOSIS — N91.2 AMENORRHEA: ICD-10-CM

## 2023-05-11 DIAGNOSIS — E66.9 OBESITY (BMI 30-39.9): ICD-10-CM

## 2023-05-11 DIAGNOSIS — Z00.00 ANNUAL PHYSICAL EXAM: ICD-10-CM

## 2023-05-11 LAB
25(OH)D3 SERPL-MCNC: 11.5 NG/ML (ref 30–100)
ALBUMIN SERPL BCP-MCNC: 3.8 G/DL (ref 3.5–5)
ALP SERPL-CCNC: 49 U/L (ref 46–116)
ALT SERPL W P-5'-P-CCNC: 25 U/L (ref 12–78)
ANION GAP SERPL CALCULATED.3IONS-SCNC: 3 MMOL/L (ref 4–13)
AST SERPL W P-5'-P-CCNC: 11 U/L (ref 5–45)
B-HCG SERPL-ACNC: <2 MIU/ML
BILIRUB SERPL-MCNC: 0.66 MG/DL (ref 0.2–1)
BUN SERPL-MCNC: 10 MG/DL (ref 5–25)
CALCIUM SERPL-MCNC: 8.9 MG/DL (ref 8.3–10.1)
CHLORIDE SERPL-SCNC: 114 MMOL/L (ref 96–108)
CHOLEST SERPL-MCNC: 144 MG/DL
CO2 SERPL-SCNC: 21 MMOL/L (ref 21–32)
CREAT SERPL-MCNC: 0.79 MG/DL (ref 0.6–1.3)
ERYTHROCYTE [DISTWIDTH] IN BLOOD BY AUTOMATED COUNT: 12.9 % (ref 11.6–15.1)
EST. AVERAGE GLUCOSE BLD GHB EST-MCNC: 85 MG/DL
GFR SERPL CREATININE-BSD FRML MDRD: 98 ML/MIN/1.73SQ M
GLUCOSE P FAST SERPL-MCNC: 74 MG/DL (ref 65–99)
HBA1C MFR BLD: 4.6 %
HCT VFR BLD AUTO: 45.3 % (ref 34.8–46.1)
HDLC SERPL-MCNC: 53 MG/DL
HGB BLD-MCNC: 14.8 G/DL (ref 11.5–15.4)
LDLC SERPL CALC-MCNC: 69 MG/DL (ref 0–100)
MCH RBC QN AUTO: 30.1 PG (ref 26.8–34.3)
MCHC RBC AUTO-ENTMCNC: 32.7 G/DL (ref 31.4–37.4)
MCV RBC AUTO: 92 FL (ref 82–98)
NONHDLC SERPL-MCNC: 91 MG/DL
PLATELET # BLD AUTO: 306 THOUSANDS/UL (ref 149–390)
PMV BLD AUTO: 10.1 FL (ref 8.9–12.7)
POTASSIUM SERPL-SCNC: 4.1 MMOL/L (ref 3.5–5.3)
PROT SERPL-MCNC: 7.4 G/DL (ref 6.4–8.4)
RBC # BLD AUTO: 4.92 MILLION/UL (ref 3.81–5.12)
SODIUM SERPL-SCNC: 138 MMOL/L (ref 135–147)
TRIGL SERPL-MCNC: 111 MG/DL
TSH SERPL DL<=0.05 MIU/L-ACNC: 2.26 UIU/ML (ref 0.45–4.5)
WBC # BLD AUTO: 6.56 THOUSAND/UL (ref 4.31–10.16)

## 2023-05-12 ENCOUNTER — TELEPHONE (OUTPATIENT)
Dept: FAMILY MEDICINE CLINIC | Facility: CLINIC | Age: 33
End: 2023-05-12

## 2023-05-12 DIAGNOSIS — E55.9 VITAMIN D DEFICIENCY: Primary | ICD-10-CM

## 2023-05-12 RX ORDER — ERGOCALCIFEROL 1.25 MG/1
50000 CAPSULE ORAL WEEKLY
Qty: 16 CAPSULE | Refills: 0 | Status: SHIPPED | OUTPATIENT
Start: 2023-05-12 | End: 2023-07-28

## 2023-05-15 ENCOUNTER — TELEPHONE (OUTPATIENT)
Dept: FAMILY MEDICINE CLINIC | Facility: CLINIC | Age: 33
End: 2023-05-15

## 2023-05-15 NOTE — TELEPHONE ENCOUNTER
She was confused regarding the dose of the Vit D 50,000 units whether to take it daily or weekly    Informed to take it weekly, then do 4000 U daily

## 2023-05-26 ENCOUNTER — TELEPHONE (OUTPATIENT)
Dept: FAMILY MEDICINE CLINIC | Facility: CLINIC | Age: 33
End: 2023-05-26

## 2023-05-26 NOTE — TELEPHONE ENCOUNTER
Patient asked if you would call her back regarding adverse reaction to medication Topriamate     Contact number  [phone]

## 2023-06-26 ENCOUNTER — TELEPHONE (OUTPATIENT)
Dept: FAMILY MEDICINE CLINIC | Facility: CLINIC | Age: 33
End: 2023-06-26

## 2023-06-26 NOTE — TELEPHONE ENCOUNTER
Patient called in to ask in it's okay that she only stopped medication Phentermine 3 full days but she also has oral surgery  today @ 10:40am  Please advise    Thanks

## 2023-07-28 DIAGNOSIS — E55.9 VITAMIN D DEFICIENCY: ICD-10-CM

## 2023-07-28 RX ORDER — ERGOCALCIFEROL 1.25 MG/1
CAPSULE ORAL
Qty: 12 CAPSULE | Refills: 1 | Status: SHIPPED | OUTPATIENT
Start: 2023-07-28

## 2023-09-28 ENCOUNTER — RA CDI HCC (OUTPATIENT)
Dept: OTHER | Facility: HOSPITAL | Age: 33
End: 2023-09-28

## 2023-09-28 NOTE — PROGRESS NOTES
[address] [facility] coding opportunities     **[facility] spreadsheet      Chart reviewed, no opportunity found: CHART REVIEWED, NO OPPORTUNITY FOUND        Patients Insurance        Commercial Insurance: [employer]

## 2023-10-04 ENCOUNTER — RA CDI HCC (OUTPATIENT)
Dept: OTHER | Facility: HOSPITAL | Age: 33
End: 2023-10-04

## 2023-10-04 NOTE — PROGRESS NOTES
[address] [facility] coding opportunities     **[facility] spreadsheet      Chart reviewed, no opportunity found: CHART REVIEWED, NO OPPORTUNITY FOUND        Patients Insurance        Commercial Insurance: [employer]

## 2023-12-31 NOTE — TELEPHONE ENCOUNTER
EMERGENCY DEPARTMENT HISTORY AND PHYSICAL EXAM     This note is dictated on M*Modal word recognition program.  There are word recognition mistakes and grammatical errors that are occasionally missed on review.     Date: 12/30/2023   Patient Name: Matthieu Rogers       History of Presenting Illness      Chief Complaint   Patient presents with    Vomiting     Pt to the ER w/ complaints of vomiting and diarrhea w/ headaches and fever/chills beginning today. Pt reports small amount of bright red blood in his vomit.         2240   Matthieu Rogers is a 25 y.o. male with PMHX of denies significant history who presents to the emergency department C/O flu-like illness.    Patient reports headache, body aches, chills, nausea, and coughing up streaky bloody sputum.  Here with his wife who is also having a flu-like illness.      PCP: No, Primary Doctor        Current Facility-Administered Medications   Medication Dose Route Frequency Provider Last Rate Last Admin    acetaminophen tablet 1,000 mg  1,000 mg Oral ED 1 Time Hernan Banks MD        ketorolac injection 15 mg  15 mg Intramuscular ED 1 Time Hernan Banks MD        ondansetron disintegrating tablet 4 mg  4 mg Oral ED 1 Time Hernan Banks MD         No current outpatient medications on file.           Past History     Past Medical History:   History reviewed. No pertinent past medical history.     Past Surgical History:   No past surgical history on file.     Family History:   No family history on file.     Social History:         Allergies:   Review of patient's allergies indicates:  No Known Allergies       Review of Systems   Review of Systems   See HPI for pertinent positives and negatives       Physical Exam     Vitals:    12/30/23 2217 12/30/23 2221   BP: 127/72    Patient Position: Sitting    Pulse: 110    Resp: 20    Temp: (!) 101.3 °F (38.5 °C)    TempSrc: Oral    SpO2: (!) 94%    Weight:  106.6 kg (235 lb)  Paged Dr Lazaro Cannon via [employer] re: pt 33wks - dog jumped on her stomach and she's already cramping - unsure what to look for  "  Height:  6' 2" (1.88 m)      Physical Exam  Vitals and nursing note reviewed.   Constitutional:       General: He is not in acute distress.     Appearance: Normal appearance. He is well-developed. He is not ill-appearing.      Comments: Flush   HENT:      Head: Normocephalic and atraumatic.   Eyes:      Extraocular Movements: Extraocular movements intact.      Conjunctiva/sclera: Conjunctivae normal.   Cardiovascular:      Rate and Rhythm: Regular rhythm. Tachycardia present.   Pulmonary:      Effort: Pulmonary effort is normal. No respiratory distress.   Abdominal:      General: There is no distension.      Palpations: Abdomen is soft.      Tenderness: There is no abdominal tenderness. There is no guarding or rebound.   Musculoskeletal:         General: No deformity or signs of injury. Normal range of motion.      Cervical back: Normal range of motion. No rigidity.   Skin:     General: Skin is dry.      Coloration: Skin is not pale.      Findings: No rash.   Neurological:      General: No focal deficit present.      Mental Status: He is alert and oriented to person, place, and time.      Cranial Nerves: No cranial nerve deficit.      Motor: No weakness.      Coordination: Coordination normal.              Diagnostic Study Results      Labs -   Recent Results (from the past 12 hour(s))   POCT Influenza A/B Molecular    Collection Time: 12/30/23 10:39 PM   Result Value Ref Range    POC Molecular Influenza A Ag Negative Negative, Not Reported    POC Molecular Influenza B Ag Negative Negative, Not Reported     Acceptable Yes         Radiologic Studies -    No orders to display        Medications given in the ED-   Medications   acetaminophen tablet 1,000 mg (has no administration in time range)   ondansetron disintegrating tablet 4 mg (has no administration in time range)   ketorolac injection 15 mg (has no administration in time range)           Medical Decision Making    I am the first provider for " this patient.     I reviewed the vital signs, available nursing notes, past medical history, past surgical history, family history and social history.     Vital Signs:  Reviewed the patient's vital signs.     Pulse Oximetry Analysis and Interpretation:    94% on Room Air, low normal      External Test Results (Pertinent to encounter):    Records Reviewed: Nursing Notes, Current Prescription Medications, Old Medical Records, and External Medical Records     History Obtained By: Patient    Provider Notes: Matthieu Rogers is a 25 y.o. male with flu-like illness    Co-morbidities Considered:  None    Differential Diagnosis:  Viral syndrome, influenza, COVID      ED Course:    Patient is a otherwise healthy 25-year-old male with fever.  Nontoxic appearing.  Reports flu-like illness and GI complaints.  Abdomen soft no peritoneal signs.  Evaluation not suspicious for appendicitis.  Reports URI symptoms such as cough, congestion.    ED Course as of 12/31/23 0008   Sat Dec 30, 2023   2304 SARS-CoV-2 RNA, Amplification, Qual(!): Positive [MO]      ED Course User Index  [MO] Hernan Banks MD     2315  COVID positive.  Patient not high risk and not candidate for antiviral therapy.  Discussed management of symptoms.  Work note provided.  Reasons to return to ED discussed.    Problems Addressed:  COVID    Procedures:   Procedures       Diagnosis and Disposition     Critical Care:      DISCHARGE NOTE:       Matthieu Rogers's  results have been reviewed with him.  He has been counseled regarding his diagnosis, treatment, and plan.  He verbally conveys understanding and agreement of the signs, symptoms, diagnosis, treatment and prognosis and additionally agrees to follow up as discussed.  He also agrees with the care-plan and conveys that all of his questions have been answered.  I have also provided discharge instructions for him that include: educational information regarding their diagnosis and  treatment, and list of reasons why they would want to return to the ED prior to their follow-up appointment, should his condition change. He has been provided with education for proper emergency department utilization.         CLINICAL IMPRESSION:         1. COVID-19              PLAN:   1. Discharge Home  2.      Medication List      You have not been prescribed any medications.        3. No follow-up provider specified.     _______________________________     Please note that this dictation was completed with [employer], the computer voice recognition software.  Quite often unanticipated grammatical, syntax, homophones, and other interpretive errors are inadvertently transcribed by the computer software.  Please disregard these errors.  Please excuse any errors that have escaped final proofreading.             Hernan Banks MD  12/31/23 0009

## 2024-01-10 PROBLEM — F17.210 CIGARETTE NICOTINE DEPENDENCE WITHOUT COMPLICATION: Status: RESOLVED | Noted: 2023-05-10 | Resolved: 2024-01-10

## 2024-01-10 NOTE — PATIENT INSTRUCTIONS
Abnormal (Dysfunctional) Uterine Bleeding   WHAT YOU NEED TO KNOW:   Abnormal uterine bleeding (AUB) is uterine bleeding that is not usual for you. It may also be called dysfunctional uterine bleeding. You may have bleeding from your uterus at times other than your normal monthly period. Your monthly periods may last longer or shorter, and bleeding may be heavier or lighter than usual. AUB can be acute (lasting a short time) or chronic (lasting longer than 6 months).       DISCHARGE INSTRUCTIONS:   Return to the emergency department if:   You continue to bleed heavily, or you feel faint.      Call your doctor or gynecologist if:   You need to change your sanitary pad or tampon more than 1 time each hour.    Your medicine causes nausea, vomiting, or diarrhea.    You have questions or concerns about your condition or care.    Medicines:  You may need any of the following:  Hormones  help decrease bleeding by making your monthly periods more regular. Sometimes this medicine may be given as birth control pills.    Iron supplements  may be given if your blood iron level decreases because of heavy bleeding. Iron may make you constipated. Ask your healthcare provider for ways to prevent or treat constipation. Iron may also make your bowel movements turn dark or black.    Take your medicine as directed.  Contact your healthcare provider if you think your medicine is not helping or if you have side effects. Tell your provider if you are allergic to any medicine. Keep a list of the medicines, vitamins, and herbs you take. Include the amounts, and when and why you take them. Bring the list or the pill bottles to follow-up visits. Carry your medicine list with you in case of an emergency.    Self-care:   Apply heat on your lower abdomen to decrease pain and muscle spasms.  Apply heat for 20 to 30 minutes every 2 hours for as many days as directed.    Include foods high in iron if needed.  Examples of foods high in iron are  leafy green vegetables, beef, pork, liver, eggs, and whole-grain breads and cereals.         Keep a diary of your menstrual cycles.  Keep track of the number of tampons or pads you use each day.    Talk to your healthcare provider before you start a weight loss program.  You may need to wait until the abnormal bleeding has stopped before you try to lose weight. The amount of iron in your blood should be normal before you lose weight. Ask your provider if weight loss will help your AUB. He or she can tell you what weight is healthy for you. He or she can help you create a safe weight loss plan, if needed.    Follow up with your doctor or gynecologist as directed:  You may need to return in 4 to 6 months so your provider knows if the AUB has stopped. Bring the diary of your menstrual cycles to your follow-up visits. Write down your questions so you remember to ask them during your visits.  © Copyright [employer] 2023 Information is for End User's use only and may not be sold, redistributed or otherwise used for commercial purposes.  The above information is an  only. It is not intended as medical advice for individual conditions or treatments. Talk to your doctor, nurse or pharmacist before following any medical regimen to see if it is safe and effective for you.  Menorrhagia   WHAT YOU NEED TO KNOW:   What is menorrhagia?  Menorrhagia is heavy menstrual bleeding for more than 7 days or severe menstrual bleeding for less than 7 days. Your menstrual bleeding and cramping are so heavy that you have trouble doing your usual daily activities. Your monthly period may also occur more often, and you may bleed between periods. Menorrhagia is common in adolescence and around menopause.       What causes menorrhagia?   A hormone imbalance    Ovaries do not work correctly and cannot produce eggs    Uterine fibroids or growths (polyps) on the lining of your uterus    Adenomyosis (thickening of your uterus)    A  pelvic infection or intrauterine device (IUD)    Complications of pregnancy, such as miscarriage or an ectopic pregnancy    Conditions such as blood coagulation disorders or uterine cancer    What increases my risk for menorrhagia?   Obesity    Not having children    Use of blood thinner medicine    Abnormal structure of your reproductive organs    Family history of endometrial or colon cancer    What are the signs and symptoms of menorrhagia?   Soaking a pad or tampon every 1 to 2 hours    Using both a pad and a tampon    Waking up at night to change your pad or tampon    Blood clots with your bleeding for more than 1 day    Abdominal pain or cramps    How is menorrhagia diagnosed?  Your healthcare provider will ask about your symptoms and examine you. Tell him or her how often you change your pad or tampon. He or she may examine you for other signs of bleeding, such as bruises or bleeding gums. He or she may ask if anything relieves your pain, such as heat or medicine. Tell your healthcare provider if you are sexually active or have ever been pregnant. You may need any of the following:  A blood test  will check for pregnancy and the cause of your blood loss. A blood test will also show anemia caused by menorrhagia.    A pelvic exam and Pap smear  may be needed to check the size and shape of your uterus and ovaries. Your healthcare provider gently inserts a warmed speculum into your vagina. A speculum is a tool that opens your vagina to show your cervix.    A biopsy  is a procedure to remove a small piece of tissue from the endometrium. The tissue is sent to a lab for tests.    An ultrasound  may show the cause of your bleeding. Sound waves are used to show pictures on a monitor.    A hysteroscopy  is a procedure to look at your endometrium. The endometrium is the lining inside of your uterus. Your healthcare provider will insert a small tube with a camera at the end into your uterus.    How is menorrhagia  treated?   Medicines:      Iron supplements  may be given if your blood iron level decreases because of heavy bleeding.    NSAIDs , such as ibuprofen, help decrease swelling, pain, and fever. NSAIDs can cause stomach bleeding or kidney problems in certain people. If you take blood thinner medicine, always ask your healthcare provider if NSAIDs are safe for you. Always read the medicine label and follow directions.    Hormones  help slow or stop your bleeding and make your monthly periods more regular. This medicine may be given as birth control pills or an intrauterine device (IUD).    Surgery and procedures  may be needed if medicines do not work or cannot be used. You may need procedures to control bleeding, such as endometrial ablation or a D&C (dilation and curettage). You may need uterine embolization or a hysterectomy. Ask your healthcare provider about these or other procedures you may need.    How can I manage my symptoms?   Keep a supply of pads or tampons with you at all times.  If possible, stay close to a bathroom.    Apply heat  on your abdomen to decrease pain and cramps. You can use a heating pad on a low setting. Apply heat for 20 to 30 minutes every 2 hours for as many days as directed.    Call your local emergency number (911 in the [place]) for any of the following:   You have chest pain and shortness of breath.    Your heart is fluttering or beating faster than usual for you.    When should I seek immediate care?   You feel dizzy when you stand.    You feel confused.    You have severe abdominal pain, nausea, and vomiting.    Your skin or the whites of your eyes turn yellow.    When should I call my doctor?   You need to change your pad or tampon more than 1 time per hour, for several hours in a row.    You feel more weak and tired than usual.    You have new coldness in your hands and feet.    You have questions or concerns about your condition or care.    CARE AGREEMENT:   You have the right to help  plan your care. Learn about your health condition and how it may be treated. Discuss treatment options with your healthcare providers to decide what care you want to receive. You always have the right to refuse treatment. The above information is an  only. It is not intended as medical advice for individual conditions or treatments. Talk to your doctor, nurse or pharmacist before following any medical regimen to see if it is safe and effective for you.  © Copyright [employer] 2023 Information is for End User's use only and may not be sold, redistributed or otherwise used for commercial purposes.    Tranexamic acid (By mouth)   Tranexamic Acid ([id number]-ik AS-id)  Treats heavy monthly periods (menstrual cycles) in women.   Brand Name(s):   There may be other brand names for this medicine.  When This Medicine Should Not Be Used:   This medicine is not right for everyone. Do not use if you had an allergic reaction to tranexamic acid, or if you have blood clot problems or a history of blood clots.  How to Use This Medicine:   Tablet  Your doctor will tell you how much medicine to use. Do not use more than directed.  Start taking this medicine when your monthly period starts. Do not take this medicine for more than 5 days during your period. Do not take more than 6 tablets in one day.  Swallow the tablet whole with liquids. Do not break, crush, or chew it.  Read and follow the patient instructions that come with this medicine. Talk to your doctor or pharmacist if you have any questions.  Missed dose: If you miss a dose or forget to use your medicine, use it as soon as you can. Wait at least 6 hours before you take another dose. Do not use extra medicine to make up for a missed dose.  Store the medicine in a closed container at room temperature, away from heat, moisture, and direct light.  Drugs and Foods to Avoid:   Ask your doctor or pharmacist before using any other medicine, including over-the-counter  medicines, vitamins, and herbal products.  Do not use this medicine together with birth control that uses hormones, such as pills or a vaginal ring.  Some medicines and foods can affect how tranexamic acid works. Tell your doctor if you are using oral tretinoin or a medicine that changes how your blood clots, such as factor IX.  Warnings While Using This Medicine:   Tell your doctor if you are pregnant or breastfeeding, or if you have kidney disease, bleeding problems, or leukemia.  This medicine may cause the following problems:  Higher risk of blood clots (which can lead to heart attack or stroke) when used with hormone birth control, such as pills or a patch  Eye and vision problems  If this medicine does not reduce your bleeding after 2 menstrual cycles or if it seems to stop working, check with your doctor.  Your doctor will check your progress and the effects of this medicine at regular visits. Keep all appointments.  Keep all medicine out of the reach of children. Never share your medicine with anyone.  Possible Side Effects While Using This Medicine:   Call your doctor right away if you notice any of these side effects:  Allergic reaction: Itching or hives, swelling in your face or hands, swelling or tingling in your mouth or throat, chest tightness, trouble breathing  Chest pain, trouble breathing, or coughing up blood  Numbness or weakness on one side of your body, sudden or severe headache, problems with vision, speech, or walking  Pain in your lower leg  Trouble seeing, vision changes  Unusual bleeding, bruising, or weakness  If you notice these less serious side effects, talk with your doctor:   Muscle, joint, or back pain  Runny or stuffy nose  If you notice other side effects that you think are caused by this medicine, tell your doctor.   Call your doctor for medical advice about side effects. You may report side effects to FDA at 3-983-FDA-5994  © Copyright [employer] 2023 Information is for End User's  use only and may not be sold, redistributed or otherwise used for commercial purposes.  The above information is an  only. It is not intended as medical advice for individual conditions or treatments. Talk to your doctor, nurse or pharmacist before following any medical regimen to see if it is safe and effective for you.  Intrauterine Device   AMBULATORY CARE:   An IUD  is a type of birth control that is inserted into your uterus. It is a small, flexible piece of plastic with a string on the end. It is inserted and removed by your healthcare provider. IUDs prevent sperm from reaching or fertilizing an egg. IUDs also prevent a fertilized egg from attaching to the uterus and developing into a fetus.       Common types of IUDs:  Your healthcare provider will recommend the type of IUD that is right for you. This is based on your age and if you have had a child. If you have not had a child, a smaller IUD will be used.     A copper IUD  slowly releases a small amount of copper into your uterus. This IUD can remain in place for up to 10 years.    A hormone-releasing IUD  slowly releases a small amount of progesterone into your uterus. Progesterone is a hormone that is made by your body to help control your periods. This IUD can remain in place for 3 to 5 years.  Seek care immediately if:   You have severe pain or bleeding during your period.    You have a fever and severe abdominal pain.    Call your doctor or gynecologist if:   You think you are pregnant.    The IUD has come out.    You have bleeding from your vagina after you have sex, and it is not your period.    You have pain during sex.    You cannot feel the IUD string, the string feels longer, or you feel the plastic of the IUD itself.    You have vaginal discharge that is green, yellow, or has a foul odor.    You have questions or concerns about your condition or care.    Advantages of an IUD:   An IUD is 98% to 99% effective in preventing  pregnancy.    The IUD can be removed by your healthcare provider if you decide to have a baby. You may be able to get pregnant as soon as the IUD is removed.    An IUD protects you from pregnancy right after it is inserted.    You do not have to stop sexual activity to insert it. You do not have to remember to take your birth control pill.    Copper IUDs are safer for some women than oral birth control pills. Examples include women who smoke or have a history of blood clots.    Hormone-releasing IUDs may decrease certain health problems. Examples include bleeding and cramping that happen with your monthly period.    Disadvantages of an IUD:   There is a small chance that you could get pregnant. Sometimes the IUD cannot be removed after you get pregnant. This increases your risk of a miscarriage or an ectopic pregnancy. Ectopic pregnancy is when the fertilized egg starts to grow somewhere other than your uterus.    An IUD does not protect you from sexually transmitted infections.    You may have cramps during the first weeks after you get the IUD.    A copper IUD may cause your monthly period to be heavier or more painful. This is more common within the first 3 months after you get the IUD. You may need to have your IUD removed if your bleeding or pain becomes severe. You may have spotting between periods.    There is a small risk of an infection within the first 20 days after the IUD is placed. Infection can lead to pelvic inflammatory disease. This can cause infertility.    Your uterus may tear when the IUD is inserted. The IUD may slip part or all of the way out of your uterus.    Self-care:   NSAIDs , such as ibuprofen, help decrease swelling, pain, and fever. This medicine is available with or without a doctor's order. NSAIDs can cause stomach bleeding or kidney problems in certain people. If you take blood thinner medicine, always ask if NSAIDs are safe for you. Always read the medicine label and follow  directions.    Apply heat to relieve pain and cramping.  Use a heating pad set on low. Apply heat to your lower abdomen for 20 minutes every hour, or as directed.    Return to activities as directed.  Your healthcare provider will tell you when it is okay to return to work, school, or other activities.    Do not use a tampon or have sex  until your provider says it is okay.    Make sure your IUD is in place:  An IUD has a string that is made of plastic thread. One to 2 inches of this string hangs into your vagina. You cannot see this string, and it should not cause problems when you have sex. Check your IUD string every 3 days for the first 3 months that you have your IUD. After that, check the string after each monthly period. Do the following to check the placement of your IUD:  Wash your hands with soap and warm water. Dry them with a clean towel.    Bend your knees and squat low to the ground.    Gently put your index finger inside your vagina. The cervix is at the top of the vagina and feels like the tip of your nose. Feel for the IUD string. Do not pull on the string. You should not be able to feel the firm plastic of the IUD itself.     Wash your hands after you check your IUD string.    For more information:   [facility]  [address] [place] [address]  Phone: [phone]  Web Address: http://www[facility]    Follow up with your doctor as directed:  Write down your questions so you remember to ask them during your visits.  © Copyright [employer] 2023 Information is for End User's use only and may not be sold, redistributed or otherwise used for commercial purposes.  The above information is an  only. It is not intended as medical advice for individual conditions or treatments. Talk to your doctor, nurse or pharmacist before following any medical regimen to see if it is safe and effective for you.

## 2024-01-10 NOTE — PROGRESS NOTES
Diagnoses and all orders for this visit:    Negative pregnancy test  -     POCT urine HCG    Menorrhagia with regular cycle  -     US pelvis complete w transvaginal; Future  -     Tranexamic Acid 650 MG TABS; Take 2 tablets (1,300 mg total) by mouth 3 (three) times a day for 5 days  -     CBC (Includes Diff/Plt) (Refl); Future  -     TSH, 3rd generation with Free T4 reflex; Future    Discussed returning to a method of BC, pt is not a fan of BC, she would like to get the US first and try TXA for the next few cycles to see if that is helpful   We discussed the Mirena IUD at length, reviewed benefits, risks, side effects  Call to schedule US    Subjective    CC: Problem visit     Anuja Candelario is a 33 y.o. female  reports monthly menstrual cycles that are heavy with large clots lasting 7 days, patient had been on OCPs in the past and desires not to take them any longer as she does not like the way they make her feel.    She mentions feeling light headed at times when passing clots. No pain   No unusual vaginal discharge or odors, no concerns for STD's    is planning on vasectomy.  Using pull out method for BC     Patient's last menstrual period was 2023 (exact date).    Past Medical History:   Diagnosis Date    Abnormal Pap smear of cervix     Anxiety     Gestational hypertension, third trimester 2020    Postpartum depression 10/1/2020     Past Surgical History:   Procedure Laterality Date    CERVICAL BIOPSY  W/ LOOP ELECTRODE EXCISION      [place]  DELIVERY ONLY N/A 9/3/2020    Procedure:  SECTION ();  Surgeon: Kenna Estrada MD;  Location: [facility] ;  Service: Obstetrics    WISDOM TOOTH EXTRACTION  2017       Immunization History   Administered Date(s) Administered    Rho (D) Immune Globulin 06/10/2020, 2020    Tdap 06/10/2020       Family History   Problem Relation Age of Onset    Thyroid disease Mother     Asthma Mother     Thyroid disease Father     Thyroid  "disease Maternal Grandmother     Alzheimer's disease Maternal Grandfather     Heart disease Paternal Grandmother      Social History     Tobacco Use    Smoking status: Former     Current packs/day: 0.00     Average packs/day: 0.5 packs/day for 14.9 years (7.5 ttl pk-yrs)     Types: Cigarettes     Start date: 2005     Quit date: 2020     Years since quittin.0    Smokeless tobacco: Never    Tobacco comments:     quit  6 months ago   Vaping Use    Vaping status: Never Used   Substance Use Topics    Alcohol use: Yes     Alcohol/week: 1.0 standard drink of alcohol     Types: 1 Cans of beer per week     Comment: social    Drug use: Never       Current Outpatient Medications:     Tranexamic Acid 650 MG TABS, Take 2 tablets (1,300 mg total) by mouth 3 (three) times a day for 5 days, Disp: 30 tablet, Rfl: 3  Patient Active Problem List    Diagnosis Date Noted    Intractable migraine without aura and without status migrainosus 2022    Chronic right-sided low back pain 2022    PCOS (polycystic ovarian syndrome) 2022    Obesity (BMI 30-39.9) 2021    Memory loss 2021    Fatigue 2021    H/O LEEP (loop electrosurgical excision procedure) of cervix complicating pregnancy, first trimester 2020    Rh negative state in antepartum period 2020    Former smoker 2020       Allergies   Allergen Reactions    Penicillins Vomiting    Topamax [Topiramate] Other (See Comments)     tingling       OB History    Para Term  AB Living   2 1 1 0 1 1   SAB IAB Ectopic Multiple Live Births   1 0 0 0 1      # Outcome Date GA Lbr Jose Daniel/2nd Weight Sex Delivery Anes PTL Lv   2 Term 20 40w4d / 01:59 3495 g (7 lb 11.3 oz) F CS-LTranv EPI N JUAN A      Complications: Fetal Intolerance   1 SAB  6w0d              Vitals:    24 1617   BP: 126/70   BP Location: Left arm   Patient Position: Sitting   Cuff Size: Large   Weight: 104 kg (229 lb)   Height: 5' 4\" (1.626 m) "     Body mass index is 39.31 kg/m².    Review of Systems     Constitutional: Negative for chills, fatigue, fever, headaches, visual disturbances, and unexpected weight change.   Respiratory: Negative for cough, & shortness of breath.  Cardiovascular: Negative for chest pain. .    Gastrointestinal: Negative for Abd pain, nausea & vomiting, constipation and diarrhea.   Genitourinary: Negative for difficulty urinating, dysuria, hematuria, dyspareunia, unusual vaginal discharge  Skin: Negative skin changes    Physical Exam     Constitutional: Alert & Oriented x3, well-developed and well-nourished. No distress.   HENT: Atraumatic, Normocephalic,   Neck: Normal range of motion.   Pulmonary: Effort normal.   Abdominal: Soft. No tenderness or masses  Musculoskeletal: Normal ROM  Skin: Warm & Dry  Psychological: Normal mood, thought content, behavior & judgement

## 2024-01-12 ENCOUNTER — OFFICE VISIT (OUTPATIENT)
Dept: OBGYN CLINIC | Facility: CLINIC | Age: 34
End: 2024-01-12
Payer: COMMERCIAL

## 2024-01-12 VITALS
SYSTOLIC BLOOD PRESSURE: 126 MMHG | DIASTOLIC BLOOD PRESSURE: 70 MMHG | WEIGHT: 229 LBS | HEIGHT: 64 IN | BODY MASS INDEX: 39.09 KG/M2

## 2024-01-12 DIAGNOSIS — N92.0 MENORRHAGIA WITH REGULAR CYCLE: ICD-10-CM

## 2024-01-12 DIAGNOSIS — Z32.02 NEGATIVE PREGNANCY TEST: Primary | ICD-10-CM

## 2024-01-12 PROCEDURE — 81025 URINE PREGNANCY TEST: CPT | Performed by: OBSTETRICS & GYNECOLOGY

## 2024-01-12 PROCEDURE — 99213 OFFICE O/P EST LOW 20 MIN: CPT | Performed by: OBSTETRICS & GYNECOLOGY

## 2024-01-12 RX ORDER — TRANEXAMIC ACID 650 MG/1
1300 TABLET ORAL 3 TIMES DAILY
Qty: 30 TABLET | Refills: 3 | Status: SHIPPED | OUTPATIENT
Start: 2024-01-12 | End: 2024-01-17

## 2024-02-09 ENCOUNTER — APPOINTMENT (OUTPATIENT)
Dept: LAB | Facility: HOSPITAL | Age: 34
End: 2024-02-09
Payer: COMMERCIAL

## 2024-02-09 ENCOUNTER — PATIENT MESSAGE (OUTPATIENT)
Dept: OBGYN CLINIC | Facility: CLINIC | Age: 34
End: 2024-02-09

## 2024-02-09 ENCOUNTER — HOSPITAL ENCOUNTER (OUTPATIENT)
Dept: ULTRASOUND IMAGING | Facility: HOSPITAL | Age: 34
End: 2024-02-09
Payer: COMMERCIAL

## 2024-02-09 DIAGNOSIS — N92.0 MENORRHAGIA WITH REGULAR CYCLE: ICD-10-CM

## 2024-02-09 DIAGNOSIS — N92.0 MENORRHAGIA WITH REGULAR CYCLE: Primary | ICD-10-CM

## 2024-02-09 LAB
BASOPHILS # BLD AUTO: 0.04 THOUSANDS/ÂΜL (ref 0–0.1)
BASOPHILS NFR BLD AUTO: 1 % (ref 0–1)
EOSINOPHIL # BLD AUTO: 0.15 THOUSAND/ÂΜL (ref 0–0.61)
EOSINOPHIL NFR BLD AUTO: 2 % (ref 0–6)
ERYTHROCYTE [DISTWIDTH] IN BLOOD BY AUTOMATED COUNT: 12.5 % (ref 11.6–15.1)
HCT VFR BLD AUTO: 38.2 % (ref 34.8–46.1)
HGB BLD-MCNC: 12.9 G/DL (ref 11.5–15.4)
IMM GRANULOCYTES # BLD AUTO: 0.03 THOUSAND/UL (ref 0–0.2)
IMM GRANULOCYTES NFR BLD AUTO: 0 % (ref 0–2)
LYMPHOCYTES # BLD AUTO: 1.82 THOUSANDS/ÂΜL (ref 0.6–4.47)
LYMPHOCYTES NFR BLD AUTO: 23 % (ref 14–44)
MCH RBC QN AUTO: 29.6 PG (ref 26.8–34.3)
MCHC RBC AUTO-ENTMCNC: 33.8 G/DL (ref 31.4–37.4)
MCV RBC AUTO: 88 FL (ref 82–98)
MONOCYTES # BLD AUTO: 0.51 THOUSAND/ÂΜL (ref 0.17–1.22)
MONOCYTES NFR BLD AUTO: 7 % (ref 4–12)
NEUTROPHILS # BLD AUTO: 5.31 THOUSANDS/ÂΜL (ref 1.85–7.62)
NEUTS SEG NFR BLD AUTO: 67 % (ref 43–75)
NRBC BLD AUTO-RTO: 0 /100 WBCS
PLATELET # BLD AUTO: 281 THOUSANDS/UL (ref 149–390)
PMV BLD AUTO: 9.4 FL (ref 8.9–12.7)
RBC # BLD AUTO: 4.36 MILLION/UL (ref 3.81–5.12)
WBC # BLD AUTO: 7.86 THOUSAND/UL (ref 4.31–10.16)

## 2024-02-09 PROCEDURE — 76856 US EXAM PELVIC COMPLETE: CPT

## 2024-02-09 PROCEDURE — 85025 COMPLETE CBC W/AUTO DIFF WBC: CPT

## 2024-02-09 PROCEDURE — 36415 COLL VENOUS BLD VENIPUNCTURE: CPT

## 2024-02-09 PROCEDURE — 76830 TRANSVAGINAL US NON-OB: CPT

## 2024-02-09 NOTE — TELEPHONE ENCOUNTER
I do not know how that happened.  She does not need to wait.  Can we replaced the order or change the order

## 2024-06-17 DIAGNOSIS — Z00.6 ENCOUNTER FOR EXAMINATION FOR NORMAL COMPARISON OR CONTROL IN CLINICAL RESEARCH PROGRAM: ICD-10-CM

## 2024-09-04 ENCOUNTER — OFFICE VISIT (OUTPATIENT)
Age: 34
End: 2024-09-04
Payer: COMMERCIAL

## 2024-09-04 VITALS
DIASTOLIC BLOOD PRESSURE: 70 MMHG | OXYGEN SATURATION: 100 % | TEMPERATURE: 93.5 F | SYSTOLIC BLOOD PRESSURE: 110 MMHG | HEIGHT: 64 IN | WEIGHT: 199 LBS | BODY MASS INDEX: 33.97 KG/M2 | HEART RATE: 64 BPM

## 2024-09-04 DIAGNOSIS — R51.9 CHRONIC NONINTRACTABLE HEADACHE, UNSPECIFIED HEADACHE TYPE: ICD-10-CM

## 2024-09-04 DIAGNOSIS — E66.9 OBESITY (BMI 30-39.9): ICD-10-CM

## 2024-09-04 DIAGNOSIS — G89.29 CHRONIC NONINTRACTABLE HEADACHE, UNSPECIFIED HEADACHE TYPE: ICD-10-CM

## 2024-09-04 DIAGNOSIS — Z87.891 FORMER SMOKER: ICD-10-CM

## 2024-09-04 DIAGNOSIS — E28.2 PCOS (POLYCYSTIC OVARIAN SYNDROME): ICD-10-CM

## 2024-09-04 DIAGNOSIS — Z00.00 ANNUAL PHYSICAL EXAM: Primary | ICD-10-CM

## 2024-09-04 PROBLEM — R41.3 MEMORY LOSS: Status: RESOLVED | Noted: 2021-09-08 | Resolved: 2024-09-04

## 2024-09-04 PROCEDURE — 99395 PREV VISIT EST AGE 18-39: CPT

## 2024-09-04 PROCEDURE — 99214 OFFICE O/P EST MOD 30 MIN: CPT

## 2024-09-04 RX ORDER — PHENTERMINE HYDROCHLORIDE 15 MG/1
15 CAPSULE ORAL EVERY MORNING
Qty: 30 CAPSULE | Refills: 0 | Status: SHIPPED | OUTPATIENT
Start: 2024-09-04 | End: 2024-10-04

## 2024-09-04 NOTE — ASSESSMENT & PLAN NOTE
"Has tried topamax in the past, made her \"go crazy\" and not interested in restarting      Patient interested in starting medication.  Reviewed options and wants to start phentermine.  Discussed with patient that this can only be used for 12 weeks.  Patient aware and interested in GLP-1's.  Recommended being referred to weight management.  Declines at this time will try phentermine first and then decide after that.  Encouraged continuation of lifestyle modifications and reducing total calorie intake    Patient will follow-up in 3 months to discuss additional options and track progress.  Current weight 199 lbs  "

## 2024-09-04 NOTE — PROGRESS NOTES
"Adult Annual Physical  Name: Anuja Candelario      : 1990      MRN: [id number]  Encounter Provider: Gasper Trotter PA-C  Encounter Date: 2024   Encounter department: [facility] [place]    Assessment & Plan   1. Annual physical exam  Comments:  Reviewed screenings and health maintenance.  Complete labs before next appointment.  2. Obesity (BMI 30-39.9)  Assessment & Plan:  Has tried topamax in the past, made her \"go crazy\" and not interested in restarting      Patient interested in starting medication.  Reviewed options and wants to start phentermine.  Discussed with patient that this can only be used for 12 weeks.  Patient aware and interested in GLP-1's.  Recommended being referred to weight management.  Declines at this time will try phentermine first and then decide after that.  Encouraged continuation of lifestyle modifications and reducing total calorie intake    Patient will follow-up in 3 months to discuss additional options and track progress.  Current weight 199 lbs  Orders:  -     phentermine 15 MG capsule; Take 1 capsule (15 mg total) by mouth every morning  -     CBC and differential; Future; Expected date: 2024  -     Comprehensive metabolic panel; Future; Expected date: 2024  -     Lipid panel; Future; Expected date: 2024  3. PCOS (polycystic ovarian syndrome)  Comments:  Controlled without medications.  Follows with OB/GYN  Orders:  -     CBC and differential; Future; Expected date: 2024  -     Comprehensive metabolic panel; Future; Expected date: 2024  4. Former smoker  Comments:  Quit 2013.. 1 ppd or more since 13 y/o.   No chronic cough or shortness of breath.  5. Chronic nonintractable headache, unspecified headache type  Assessment & Plan:  uncontrolled, has been happening daily for the past few months per patient . reviewed risk factor reductions and better hydration and avoiding energy drinks.  Patient declines starting a " prophylactic medication today.  Wrote down drug names for patient to look research and will follow-up if interested in starting.  Reviewed risks and benefits of propranolol, amitriptyline and calcium channel blockers    Immunizations and preventive care screenings were discussed with patient today. Appropriate education was printed on patient's after visit summary.    - Patient was counseled regarding: instructions for management, risk factor reductions, prognosis, patient and family education, risks and benefits of treatment options and importance of compliance with treatment  - Medication Side Effects: Adverse side effects of medications were reviewed with the patient/guardian today.    Counseling:  Alcohol/drug use: discussed moderation in alcohol intake, the recommendations for healthy alcohol use, and avoidance of illicit drug use.  Dental Health: discussed importance of regular tooth brushing, flossing, and dental visits.  Injury prevention: discussed safety/seat belts, safety helmets, smoke detectors, carbon dioxide detectors, and smoking near bedding or upholstery.  Sexual health: discussed sexually transmitted diseases, partner selection, use of condoms, avoidance of unintended pregnancy, and contraceptive alternatives.  Exercise: the importance of regular exercise/physical activity was discussed. Recommend exercise 3-5 times per week for at least 30 minutes.       Depression Screening and Follow-up Plan: Patient was screened for depression during today's encounter. They screened negative with a PHQ-2 score of 0.        History of Present Illness     Adult Annual Physical:  Patient presents for annual physical. Patient is here for annual physical exam and discuss weight loss and chronic migraines.    Migraines have been occurring daily described as pressure and bilateral although there is light and noise sensitivity aspect.   with her states is probably due to all the energy drinks as she has 1 or 2  C4 or Celsius beverages daily.  Each migraine is similar in nature every time.  Resolves shortly after taking Excedrin.     Diet and Physical Activity:  - Diet/Nutrition: well balanced diet and limited junk food.  - Exercise: walking.    Depression Screening:  - PHQ-2 Score: 0    General Health:  - Sleep: sleeps well.  - Hearing: normal hearing right ear and normal hearing left ear.  - Vision: wears glasses.  - Dental: brushes teeth twice daily.    /GYN Health:  - Follows with GYN: yes.   - Menopause: premenopausal.     Advanced Care Planning:  - Has an advanced directive?: no    - Has a durable medical POA?: no    - ACP document given to patient?: no      Review of Systems   Constitutional:  Negative for activity change, diaphoresis, fatigue and fever.   HENT: Negative.  Negative for congestion and ear pain.    Eyes: Negative.    Respiratory:  Negative for cough, chest tightness and shortness of breath.    Cardiovascular: Negative.    Gastrointestinal: Negative.    Endocrine: Negative for polydipsia, polyphagia and polyuria.   Genitourinary: Negative.  Negative for dysuria, flank pain, frequency, hematuria and urgency.   Musculoskeletal:  Negative for back pain, joint swelling, neck pain and neck stiffness.   Skin: Negative.    Neurological:  Positive for headaches. Negative for dizziness, weakness and light-headedness.   Hematological:  Negative for adenopathy.   Psychiatric/Behavioral: Negative.  Negative for confusion and sleep disturbance. The patient is not nervous/anxious.      Medical History Reviewed by provider this encounter:  Tobacco  Allergies  Meds  Problems  Med Hx  Surg Hx  Fam Hx       Past Medical History   Past Medical History:   Diagnosis Date    Abnormal Pap smear of cervix 2015    Anxiety     Gestational hypertension, third trimester 08/18/2020    Memory loss 09/08/2021    Postpartum depression 10/01/2020     Past Surgical History:   Procedure Laterality Date    CERVICAL BIOPSY  W/  "LOOP ELECTRODE EXCISION  2015    OK  DELIVERY ONLY N/A 9/3/2020    Procedure:  SECTION ();  Surgeon: Kenna Estrada MD;  Location: [facility] ;  Service: Obstetrics    WISDOM TOOTH EXTRACTION  2017     Family History   Problem Relation Age of Onset    Thyroid disease Mother     Asthma Mother     Thyroid disease Father     Thyroid disease Maternal Grandmother     Alzheimer's disease Maternal Grandfather     Heart disease Paternal Grandmother      No current outpatient medications on file prior to visit.     No current facility-administered medications on file prior to visit.     Allergies   Allergen Reactions    Penicillins Vomiting    Topamax [Topiramate] Other (See Comments)     tingling      No current outpatient medications on file prior to visit.     No current facility-administered medications on file prior to visit.      Social History     Tobacco Use    Smoking status: Former     Current packs/day: 0.00     Average packs/day: 0.5 packs/day for 14.9 years (7.5 ttl pk-yrs)     Types: Cigarettes     Start date: 2005     Quit date: 2020     Years since quittin.6    Smokeless tobacco: Never    Tobacco comments:     quit  6 months ago   Vaping Use    Vaping status: Never Used   Substance and Sexual Activity    Alcohol use: Yes     Alcohol/week: 1.0 standard drink of alcohol     Types: 1 Cans of beer per week     Comment: social    Drug use: Never    Sexual activity: Yes     Partners: Male     Birth control/protection: None     Comment: Eleni        Objective     /70   Pulse 64   Temp (!) 93.5 °F (34.2 °C)   Ht 5' 4\" (1.626 m)   Wt 90.3 kg (199 lb)   SpO2 100%   BMI 34.16 kg/m²     Physical Exam  Vitals and nursing note reviewed.   Constitutional:       General: She is not in acute distress.     Appearance: She is obese. She is not ill-appearing, toxic-appearing or diaphoretic.   HENT:      Head: Normocephalic and atraumatic.      Right Ear: External ear normal. "      Left Ear: External ear normal.      Nose: Nose normal.   Eyes:      General: No scleral icterus.        Right eye: No discharge.         Left eye: No discharge.      Extraocular Movements: Extraocular movements intact.      Conjunctiva/sclera: Conjunctivae normal.   Neck:      Vascular: No carotid bruit.   Cardiovascular:      Rate and Rhythm: Normal rate and regular rhythm.      Heart sounds: No murmur heard.  Pulmonary:      Effort: Pulmonary effort is normal. No respiratory distress.      Breath sounds: Normal breath sounds. No wheezing or rales.   Musculoskeletal:      Cervical back: Normal range of motion and neck supple.      Right lower leg: No edema.      Left lower leg: No edema.   Skin:     Findings: No rash.   Neurological:      General: No focal deficit present.      Mental Status: She is alert. Mental status is at baseline.      Sensory: No sensory deficit.      Motor: No weakness.      Coordination: Coordination normal.      Gait: Gait normal.   Psychiatric:         Mood and Affect: Mood normal.         Behavior: Behavior normal.         Thought Content: Thought content normal.         Judgment: Judgment normal.

## 2024-09-04 NOTE — ASSESSMENT & PLAN NOTE
uncontrolled, has been happening daily for the past few months per patient . reviewed risk factor reductions and better hydration and avoiding energy drinks.  Patient declines starting a prophylactic medication today.  Wrote down drug names for patient to look research and will follow-up if interested in starting.  Reviewed risks and benefits of propranolol, amitriptyline and calcium channel blockers

## 2024-09-05 ENCOUNTER — TELEPHONE (OUTPATIENT)
Age: 34
End: 2024-09-05

## 2024-09-05 DIAGNOSIS — E66.9 OBESITY (BMI 30-39.9): Primary | ICD-10-CM

## 2024-09-13 NOTE — TELEPHONE ENCOUNTER
PA for WEGOVY 0.25MG-SUBMITTED     via    [x]CMM-KEY: [id number]  []Surescripts-Case ID #   []Faxed to plan   []Other website   []Phone call Case ID #     Office notes sent, clinical questions answered. Awaiting determination    Turnaround time for your insurance to make a decision on your Prior Authorization can take 7-21 business days.

## 2024-09-23 DIAGNOSIS — E66.9 OBESITY (BMI 30-39.9): ICD-10-CM

## 2024-09-23 RX ORDER — PHENTERMINE HYDROCHLORIDE 30 MG/1
30 CAPSULE ORAL EVERY MORNING
Qty: 30 CAPSULE | Refills: 0 | Status: SHIPPED | OUTPATIENT
Start: 2024-09-27 | End: 2024-10-27

## 2024-10-29 DIAGNOSIS — E66.9 OBESITY (BMI 30-39.9): ICD-10-CM

## 2024-10-29 RX ORDER — PHENTERMINE HYDROCHLORIDE 30 MG/1
30 CAPSULE ORAL EVERY MORNING
Qty: 30 CAPSULE | Refills: 0 | Status: SHIPPED | OUTPATIENT
Start: 2024-10-29 | End: 2024-11-28

## 2024-12-10 ENCOUNTER — PATIENT MESSAGE (OUTPATIENT)
Age: 34
End: 2024-12-10

## 2024-12-10 DIAGNOSIS — E66.9 OBESITY (BMI 30-39.9): ICD-10-CM

## 2024-12-11 RX ORDER — PHENTERMINE HYDROCHLORIDE 30 MG/1
30 CAPSULE ORAL EVERY MORNING
Qty: 30 CAPSULE | Refills: 0 | Status: SHIPPED | OUTPATIENT
Start: 2024-12-11 | End: 2025-01-10

## 2025-02-20 DIAGNOSIS — M25.551 RIGHT HIP PAIN: Primary | ICD-10-CM

## 2025-02-20 DIAGNOSIS — E66.9 OBESITY (BMI 30-39.9): ICD-10-CM

## 2025-02-20 RX ORDER — PHENTERMINE HYDROCHLORIDE 37.5 MG/1
37.5 CAPSULE ORAL EVERY MORNING
Qty: 30 CAPSULE | Refills: 0 | Status: SHIPPED | OUTPATIENT
Start: 2025-02-20 | End: 2025-03-22

## 2025-02-25 ENCOUNTER — APPOINTMENT (OUTPATIENT)
Age: 35
End: 2025-02-25
Payer: COMMERCIAL

## 2025-02-25 ENCOUNTER — RESULTS FOLLOW-UP (OUTPATIENT)
Age: 35
End: 2025-02-25

## 2025-02-25 DIAGNOSIS — E28.2 PCOS (POLYCYSTIC OVARIAN SYNDROME): ICD-10-CM

## 2025-02-25 DIAGNOSIS — Z00.6 ENCOUNTER FOR EXAMINATION FOR NORMAL COMPARISON OR CONTROL IN CLINICAL RESEARCH PROGRAM: ICD-10-CM

## 2025-02-25 DIAGNOSIS — M25.551 RIGHT HIP PAIN: ICD-10-CM

## 2025-02-25 DIAGNOSIS — E66.9 OBESITY (BMI 30-39.9): ICD-10-CM

## 2025-02-25 LAB
ALBUMIN SERPL BCG-MCNC: 4.2 G/DL (ref 3.5–5)
ALP SERPL-CCNC: 51 U/L (ref 34–104)
ALT SERPL W P-5'-P-CCNC: 18 U/L (ref 7–52)
ANION GAP SERPL CALCULATED.3IONS-SCNC: 10 MMOL/L (ref 4–13)
AST SERPL W P-5'-P-CCNC: 18 U/L (ref 13–39)
BASOPHILS # BLD AUTO: 0.07 THOUSANDS/ÂΜL (ref 0–0.1)
BASOPHILS NFR BLD AUTO: 1 % (ref 0–1)
BILIRUB SERPL-MCNC: 1.41 MG/DL (ref 0.2–1)
BUN SERPL-MCNC: 10 MG/DL (ref 5–25)
CALCIUM SERPL-MCNC: 9.1 MG/DL (ref 8.4–10.2)
CHLORIDE SERPL-SCNC: 107 MMOL/L (ref 96–108)
CHOLEST SERPL-MCNC: 120 MG/DL (ref ?–200)
CO2 SERPL-SCNC: 23 MMOL/L (ref 21–32)
CREAT SERPL-MCNC: 0.73 MG/DL (ref 0.6–1.3)
EOSINOPHIL # BLD AUTO: 0.12 THOUSAND/ÂΜL (ref 0–0.61)
EOSINOPHIL NFR BLD AUTO: 2 % (ref 0–6)
ERYTHROCYTE [DISTWIDTH] IN BLOOD BY AUTOMATED COUNT: 13 % (ref 11.6–15.1)
GFR SERPL CREATININE-BSD FRML MDRD: 107 ML/MIN/1.73SQ M
GLUCOSE P FAST SERPL-MCNC: 87 MG/DL (ref 65–99)
HCT VFR BLD AUTO: 38 % (ref 34.8–46.1)
HDLC SERPL-MCNC: 47 MG/DL
HGB BLD-MCNC: 12.7 G/DL (ref 11.5–15.4)
IMM GRANULOCYTES # BLD AUTO: 0.01 THOUSAND/UL (ref 0–0.2)
IMM GRANULOCYTES NFR BLD AUTO: 0 % (ref 0–2)
LDLC SERPL CALC-MCNC: 66 MG/DL (ref 0–100)
LYMPHOCYTES # BLD AUTO: 1.76 THOUSANDS/ÂΜL (ref 0.6–4.47)
LYMPHOCYTES NFR BLD AUTO: 33 % (ref 14–44)
MCH RBC QN AUTO: 29.7 PG (ref 26.8–34.3)
MCHC RBC AUTO-ENTMCNC: 33.4 G/DL (ref 31.4–37.4)
MCV RBC AUTO: 89 FL (ref 82–98)
MONOCYTES # BLD AUTO: 0.43 THOUSAND/ÂΜL (ref 0.17–1.22)
MONOCYTES NFR BLD AUTO: 8 % (ref 4–12)
NEUTROPHILS # BLD AUTO: 2.94 THOUSANDS/ÂΜL (ref 1.85–7.62)
NEUTS SEG NFR BLD AUTO: 56 % (ref 43–75)
NONHDLC SERPL-MCNC: 73 MG/DL
NRBC BLD AUTO-RTO: 0 /100 WBCS
PLATELET # BLD AUTO: 290 THOUSANDS/UL (ref 149–390)
PMV BLD AUTO: 10.2 FL (ref 8.9–12.7)
POTASSIUM SERPL-SCNC: 3.7 MMOL/L (ref 3.5–5.3)
PROT SERPL-MCNC: 6.5 G/DL (ref 6.4–8.4)
RBC # BLD AUTO: 4.27 MILLION/UL (ref 3.81–5.12)
SODIUM SERPL-SCNC: 140 MMOL/L (ref 135–147)
TRIGL SERPL-MCNC: 36 MG/DL (ref ?–150)
WBC # BLD AUTO: 5.33 THOUSAND/UL (ref 4.31–10.16)

## 2025-02-25 PROCEDURE — 36415 COLL VENOUS BLD VENIPUNCTURE: CPT

## 2025-02-25 PROCEDURE — 80061 LIPID PANEL: CPT

## 2025-02-25 PROCEDURE — 85025 COMPLETE CBC W/AUTO DIFF WBC: CPT

## 2025-02-25 PROCEDURE — 73502 X-RAY EXAM HIP UNI 2-3 VIEWS: CPT

## 2025-02-25 PROCEDURE — 80053 COMPREHEN METABOLIC PANEL: CPT

## 2025-02-26 ENCOUNTER — OFFICE VISIT (OUTPATIENT)
Age: 35
End: 2025-02-26
Payer: COMMERCIAL

## 2025-02-26 ENCOUNTER — TELEPHONE (OUTPATIENT)
Age: 35
End: 2025-02-26

## 2025-02-26 VITALS
SYSTOLIC BLOOD PRESSURE: 112 MMHG | DIASTOLIC BLOOD PRESSURE: 74 MMHG | BODY MASS INDEX: 28.58 KG/M2 | HEIGHT: 64 IN | WEIGHT: 167.4 LBS

## 2025-02-26 DIAGNOSIS — N92.0 MENORRHAGIA WITH REGULAR CYCLE: ICD-10-CM

## 2025-02-26 DIAGNOSIS — Z30.42 ENCOUNTER FOR MANAGEMENT AND INJECTION OF DEPO-PROVERA: Primary | ICD-10-CM

## 2025-02-26 DIAGNOSIS — Z30.013 INITIATION OF DEPO PROVERA: ICD-10-CM

## 2025-02-26 DIAGNOSIS — Z30.09 BIRTH CONTROL COUNSELING: ICD-10-CM

## 2025-02-26 PROCEDURE — 99213 OFFICE O/P EST LOW 20 MIN: CPT

## 2025-02-26 PROCEDURE — 96372 THER/PROPH/DIAG INJ SC/IM: CPT

## 2025-02-26 RX ORDER — MEDROXYPROGESTERONE ACETATE 150 MG/ML
150 INJECTION, SUSPENSION INTRAMUSCULAR ONCE
Status: COMPLETED | OUTPATIENT
Start: 2025-02-26 | End: 2025-02-26

## 2025-02-26 RX ORDER — MEDROXYPROGESTERONE ACETATE 150 MG/ML
150 INJECTION, SUSPENSION INTRAMUSCULAR
Qty: 1 ML | Refills: 1 | Status: SHIPPED | OUTPATIENT
Start: 2025-02-26

## 2025-02-26 RX ADMIN — MEDROXYPROGESTERONE ACETATE 150 MG: 150 INJECTION, SUSPENSION INTRAMUSCULAR at 16:56

## 2025-02-26 NOTE — PATIENT INSTRUCTIONS
BIRTH CONTROL OPTIONS     There are a number of methods available to help prevent pregnancy.    Hormonal methods of birth control (contraception) contain either estrogen and progestin or progestin only; they are a safe and reliable way to prevent pregnancy for most people. Hormonal methods include an implant, an intrauterine device (IUD), injections, pills, vaginal rings, and skin patches.    This document discusses the various hormonal methods of birth control that are available.     CHOOSING A BIRTH CONTROL METHOD     It can be difficult to decide which birth control method is best due to the variety of options available. The best method is one that will be used consistently and does not cause bothersome side effects. Other factors to consider include:    ?Efficacy (how well it works to prevent pregnancy)  ?Convenience  ?How long the drug or device can be used  ?Whether and how it affects your monthly period  ?Type and frequency of side effects  ?Affordability  ?Privacy concerns  ?Whether or not it also protects against sexually transmitted diseases  ?How quickly your fertility will return if you stop taking it    You should also think about whether you are comfortable remembering to take a pill every day, whether you want to involve your partner(s) in the decision, and whether and when you might want to get pregnant in the future. No birth control is perfect; you must balance the advantages and disadvantages of the different options and decide which method is best for you.    BIRTH CONTROL IMPLANT     The implant (brand name: Nexplanon) is a small klely that contains the hormone progestin. It is inserted under the skin into the upper inner arm by a health care provider. It is effective for at least three years but can be removed earlier if you decide you want to get pregnant or simply prefer not to continue use of the implant. Insertion and removal can be done in an office or clinic.  The implant is one of the most  effective methods of birth control. It provides at least three years of protection from pregnancy as progestin is slowly absorbed into the surrounding tissues. Depending on when during the menstrual cycle the implant is placed, backup birth control (for instance condoms) may be recommended for one week following placement. Irregular bleeding is the most bothersome side effect. Fertility returns rapidly after the kelly is removed.    Side effects -- The most common side effects of the implant are irregular/unpredictable bleeding.    IUD WITH PROGESTIN     There are several intrauterine devices (IUDs) that contain a hormone called levonorgestrel (a type of progestin). One type (brand names: Liletta, Mirena) can be left in place for up to six years. The other options (brand names: Kyleena, Mary) are somewhat smaller and can be left in place for up to five years (Kyleena) or three years (Mary). All of the levonorgestrel IUDs are highly effective in preventing pregnancy.    Side effects -- Although irregular bleeding is common initially after progestin IUD placement, bleeding tends to diminish over time. With ongoing use, people using Mirena or Liletta often experience little or no bleeding. Those who use Kyleena or Mary are more likely to continue having monthly periods.      INJECTABLE BIRTH CONTROL     The only injectable contraceptive currently available in the [place] is depot medroxyprogesterone acetate or DMPA (brand name: Depo-Provera). DMPA is injected deep into a muscle, such as in the buttock or upper arm, or injected subcutaneously (under the skin). With either type of injection, this contraceptive is given once every three months.    DMPA prevents ovulation and thickens the cervical mucus, making the cervix impenetrable to sperm. If you get your first dose of DMPA during the first seven days of your menstrual period, it prevents pregnancy immediately. If you get your first dose after the seventh day  "of your period, you should use a second form of birth control (eg, condoms) for seven days. DMPA is very effective, with a failure (pregnancy) rate of less than 1 percent when repeat injections are given on time.    Side effects -- The most common side effects of DMPA are irregular or prolonged bleeding and spotting, particularly during the first few months of use. Up to 50 percent of people completely stop having menstrual periods (doctors call this \"amenorrhea\") after one year of DMPA use. Monthly periods generally return within six months of the last DMPA injection, although, in some cases, it may take longer for periods to return. Some people gain weight while they are getting DMPA injections.    In people who get the DMPA shot, there is no increased risk of cardiovascular complications or cancer. Use of DMPA is associated with decreased bone mineral density; however, this effect is mostly or completely reversed after stopping the injections. Studies have not shown an increased risk of bone fractures in people who have used DMPA in the past.    Because DMPA is long-acting, it may not be ideal if you want to get pregnant shortly after stopping the medication. Although most people are able to conceive within 10 months, fertility may not return for up to 18 months after the last injection.    Benefits compared with birth control pills -- There are a number of people who prefer DMPA to the pill, including those who:    ?Have difficulty remembering to take a pill every day  ?Value the privacy with DMPA use  ?Cannot use estrogen  ?Also take seizure medications, which can be less effective with combination hormonal contraceptives.    Additional benefits of DMPA include a decreased risk of uterine cancer and pelvic inflammatory disease.    BIRTH CONTROL PILLS   Most oral contraceptives, commonly called \"the pill,\" contain a combination of estrogen and progestin. The combination pill reduces the risk of pregnancy " "by:    ?Preventing ovulation  ?Keeping the mucus in the cervix thick and impenetrable to sperm  ?Keeping the lining of the uterus thin    The pill makes menstrual bleeding more regular, with fewer days of flow and overall lighter flow. Other benefits of the pill include a reduction in:    ?Menstrual cramps or pain  ?Risk of ovarian cancer or cancer of the endometrium (uterine lining)  ?Acne  ?Iron-deficiency anemia (a low blood count due to low iron levels)    One potential downside of the pill is that, in order to maximize efficacy, you have to remember to take it every day, ideally at the same time of day. Some people find this difficult or inconvenient.    Efficacy -- When taken properly, birth control pills are a highly effective form of contraception; however, skipping pills or forgetting to restart the pill after the week of your period will increase risk of pregnancy. Approximately 9 out of every 100 people who take birth control pills for one year will have an unintended pregnancy.    Missed pills are a common cause of pregnancy. In general, if you forget to take an active pill (containing hormones), you should take it as soon as possible and take the next one at the usual time it is due. If you miss more than two pills, use a backup method of birth control (eg, condoms) for seven days.    Side effects -- Possible side effects of the pill include:    ?Nausea, breast tenderness, bloating, and mood changes - These typically improve within two to three months without treatment (while continuing the pill).    ?Irregular bleeding - Irregular bleeding, also called \"breakthrough bleeding\" or \"spotting,\" is particularly common during the first few months of taking the pill. It almost always resolves without any treatment within two to three months. Forgetting a pill can also cause breakthrough bleeding.    Taking birth control pills does not cause weight gain.    Potential complications -- When the pill was first " "introduced in the 1960s, the doses of both hormones (estrogen and progestin) were quite high. Because of this, cardiovascular complications occurred, such as high blood pressure, heart attacks, strokes, and blood clots in the legs and lungs.    The pills prescribed today have much lower doses of progestin and estrogen, which has decreased the risk of these complications. As a result, birth control pills are now considered a reliable and safe option for most healthy, nonsmoking people. While there is a very small risk of blood clots, this risk is actually lower than the risk during pregnancy or soon after giving birth.  Experts have studied the possible association between taking the pill and the risk of breast cancer. While these studies have had mixed results, there is some evidence that people who take the pill do have a slightly higher risk of getting breast cancer later in life than those who do not. However, if there is an increase in risk, it is very small, especially in younger people. It is important to balance this against the benefits of the pill, which include not only pregnancy prevention but a sizable reduction in the risk of ovarian and endometrial cancer.     Who should not take the pill? -- Because of an increased risk of complications, you should not take the pill if you:    ?Are 35 or older and smoke cigarettes (as this puts you at high risk for cardiovascular complications such as blood clots or heart attack).    ?Could be pregnant.    ?Have had blood clots or a stroke in the past (as this increases your risk of blood clots while taking the pill).    ?Have a history of an \"estrogen-dependent\" tumor (eg, breast or uterine cancer).    ?Have abnormal or unexplained menstrual bleeding (in which case the cause of the bleeding should be investigated before starting the pill).    ?Have active liver disease (the pill could worsen the liver disease).    ?Have migraine headaches associated with certain " visual or other neurologic symptoms (eg, aura), which increases your risk of stroke.    If you are taking the pill, tell your health care provider right away if you experience abdominal pain, chest pain, severe headaches, eye problems, or severe leg pain. These could be symptoms of several serious conditions including heart attack, blood clot, stroke, and liver or gallbladder disease.    Some people may take the pill under certain circumstances but need close monitoring. Talk with your doctor or nurse if you:    ?Have high blood pressure - You may experience a further increase in blood pressure and should be monitored more frequently while on the pill.    ?Take certain medication for seizures (epilepsy) - In this case, the pill may be slightly less effective in preventing pregnancy because the seizure medicines change the way it is metabolized.    ?Have diabetes mellitus - People with diabetes and kidney disease or vascular complications from diabetes should not use the pill.    Medication interactions -- The pill may not work as well to prevent pregnancy if you also take certain other medications.    Anticonvulsants -- Some anticonvulsants, including phenytoin (sample brand names: Dilantin, Phenytek), carbamazepine (sample brand names: Carbatrol, Tegretol), barbiturates, primidone (brand name: Mysoline), topiramate (sample brand name: Topamax), and oxcarbazepine (sample brand name: Trileptal), decrease the effectiveness of birth control pills as well as patches, vaginal rings, and the implant. As a result, people who take these anticonvulsants are advised to avoid hormonal birth control methods (with the exception of depot medroxyprogesterone acetate or DMPA [brand name: Depo-Provera] and intrauterine devices [IUDs] with progestin).     Other anticonvulsants do not appear to reduce contraceptive efficacy, including gabapentin (sample brand names: Gralise, Neurontin), lamotrigine (sample brand names: Lamictal,  "Subvenite), levetiracetam (sample brand names: Keppra, Roweepra), tiagabine (brand name: Gabitril), and valproic acid (brand name: Depakote). However, there is some concern that oral contraceptives may reduce the effectiveness of lamotrigine, potentially increasing the risk of seizures.    If you take any anti-seizure medications, it is important to talk with your health care provider about possible interactions before starting the pill or another hormonal birth control method.  Antibiotics -- Rifampin, which is sometimes used to treat tuberculosis, can decrease the efficacy of hormonal birth control. As a result, people who take rifampin are advised to avoid most hormonal birth control methods, with the exception of DMPA (brand name: Depo-Provera) and IUDs with progestin. Alternative options include a copper IUD, condoms, or a diaphragm, or tubal ligation (permanent birth control).     Contrary to popular belief, other (more commonly used) antibiotics do not affect the efficacy of hormonal birth control methods. Backup contraception is not needed when you take these antibiotics.    Tanna's Wort -- Tanna's wort, an herbal supplement sometimes taken to treat depression, may reduce the effectiveness of birth control pills, and possibly the patch and ring.    Starting the pill -- Ideally, you should start taking the pill on the first day of your period. This provides protection from pregnancy beginning immediately.    As long as you are sure you are not pregnant (which can be confirmed with a urine pregnancy test), it is also an option to start the pill as soon as your doctor prescribes it, regardless of where you are in your menstrual cycle. This is called the \"quick start\" method. If you do this, you will need to use a backup form of birth control (eg, condoms) for the first seven days after the quick start.    Many people start taking the pill on the first Sunday after their period starts (because most pill " "packs are arranged for a Sunday start). If you do this, you will also need to use some form of backup contraception (eg, condoms) for the first seven days after the Sunday start.    When to expect bleeding -- Traditionally, the pill is taken on a 28-day cycle that includes 21 days of hormone pills followed by 7 days of placebo pills (\"no hormone pills\") that do not contain hormones. Newer formulations have a longer duration of hormone pills (eg, 24 days) and fewer days of placebo pills (eg, 4 days). It is not necessary to take the placebo pills as they do not contain any active ingredients, but many people find it easier to stay on schedule when they continue to take a daily pill throughout the entire 28-day cycle.    Bleeding should occur during the fourth week of the pill pack (ie, the week that you are taking placebo pills or no pills). However, some people have irregular breakthrough bleeding or spotting in the first few months.     Continuous dosing -- Some people prefer to take hormone-containing birth control pills continuously, without the week of no pills or placebo pills. This allows you to control whether and when you have a monthly period. This regimen may be a good option if you have painful periods, endometriosis (a condition that causes pelvic pain), or bothersome premenstrual symptoms, including mood changes.    Traditional birth control pill packs can be dosed continuously to get rid of monthly bleeding. To do this, you take the first three weeks of a pill pack, then immediately start a new pack the next day (without taking a break or taking the placebo pills). This can be continued for as long as desired. A pill called Seasonale was specifically designed for continuous dosing. You take an active pill every day for 12 weeks, followed by seven days of placebo pills. With this regimen, you only experience bleeding once every three months. Seasonique it is similar; it contains 84 days of active pills " "and 7 days of low-dose estrogen pills. The addition of low-dose estrogen pills is intended to reduce breakthrough bleeding and possibly other symptoms, such as mood changes and headaches. Both are available as generic medications that work in the same way.    Over time, using continuous-dosing regimens results in fewer bleeding episodes per year (or no bleeding at all); however, many people experience unpredictable breakthrough bleeding when starting a continuous-dosing regimen. Breakthrough bleeding is inconvenient but does not mean that the pills are less effective (assuming you are taking them at the same time each day and not skipping any active pills).    Progestin-only pills -- Some pills contain only progestin (sometimes called the \"mini pill\"); these may be an option for people who cannot or should not take estrogen. This includes those who are breastfeeding or who have worsened migraines or high blood pressure with combination contraceptive pills. Progestin-only pills appear to be as effective as combination pills when taken at the same time every day, but they have a slightly higher failure rate if you are more than three hours late in taking them. A backup method of birth control should be used for seven days if you forget a pill or are more than three hours late in taking it. Breakthrough bleeding or spotting is common with both types of progestin-only pills.  Progestin-only pills are available in 28-day packs. Two types of progestin-only pills are available in the [place]. For one type (containing norethindrone), all 28 pills contain the hormone (ie, there is no \"placebo week\"). For the second type (containing drospirenone), each pack includes 24 hormone pills and 4 placebo pills. The drospirenone pill may be more effective than the norethindrone pill. A disadvantage of the drospirenone pill is that no generic is available, which may make it more expensive.    VAGINAL RINGS     These are flexible " plastic rings that contain estrogen and a progestin. The ring is inserted into the vagina, and the hormones are slowly absorbed into the body. This prevents pregnancy, similar to the pill. You keep the ring in your vagina for three weeks then leave it out for one week, during which you will experience bleeding. The following week, you insert a new ring or reinsert the previous one (one type of ring is reusable for approximately one year, while the other type needs to be discarded and replaced each month). As long as the ring remains in the vagina and is not uncomfortable, the ring's position inside the vagina is not important.    You can start using the vaginal ring anytime during your menstrual cycle. If you start it more than seven days after the first day of your last period, or if you are not sure when your last period started, you should use a backup method of contraception (eg, condoms) for the first seven days of inserting the ring.    Most people cannot feel the ring while it is in place, and in most cases, it is easy to insert and remove. It may be removed for a short time if desired, as discussed below, but should be left in during intercourse. Your partner most likely will not be able to feel the ring. You should use your fingers to check before and after sex to confirm the ring is in place. If the ring is left out for more than a few hours, you may be able to put it back in, or you may need to discard it, depending on which type of ring you use and where you are in your menstrual cycle. Because the instructions can vary, it is important to read the information that comes with your ring.    If you use the ring that gets replaced each month (brand names: NuvaRing, EluRyng):    ?During the first two weeks of your cycle, you can reinsert the ring and continue with the usual schedule. The ring should be rinsed in cool or warm (but not hot) water, without soap or detergent, before it is reinserted.    ?During  the third week, you can insert a new ring and begin a new cycle immediately, in which case you will not get a period. If the ring was previously in place for at least seven days in a row, you can also choose to leave the ring out for up to one week (during which you have your period) and then insert a new one.    ?Regardless of where you are in your cycle, if the ring is left out for more than three hours, you should use a backup method of birth control (eg, condoms) for the next seven days. Any backup method other than the female condom or diaphragm can be used.    If you use the reusable ring that gets reinserted each month (brand name: Annovera), the instructions are the same regardless of where you are in your menstrual cycle:    ?If the ring is out for two hours or less, you can reinsert it. Before doing so, wash the ring with mild soap, rinse with water, and gently dry.    ?If the ring is out for more than two hours, either continuously or cumulatively (eg, if you take it out two separate times that add up to more than two hours), you should also clean and insert the ring. However, you also must use a backup form of birth control (eg, condoms) for the next seven days.    As with the pill, in addition to being an effective method of preventing pregnancy, the vaginal ring also has other potential benefits. These include a reduction in menstrual cramps, iron-deficiency anemia, and the risk of certain cancers.    Risks and side effects are also similar to those of oral contraceptives.     BIRTH CONTROL SKIN PATCHES     Birth control skin patches (sample brand names: Xulane, Twirla) contain estrogen and progestin, similar to oral contraceptives. Both patches are similar to the pill in terms of efficacy in preventing pregnancy. Some people prefer patches because they do not require remembering to take a pill each day; on the other hand, some people do not like having a visible patch on their skin. Neither patch type  "should be used by people with a body mass index (BMI) of 30 kg/m2 or higher.    The patch is worn for one week on the shoulder, upper back, abdomen, or buttock; one type (brand name: Twirla) can also be worn on the upper arm. After one week, you remove the old patch and apply a new one; this is done for three weeks. During the fourth week, you do not apply a new patch; you will experience bleeding during this time.    The patch is ideally started on the first day of your period. This approach provides protection from pregnancy immediately. If you prefer, you can also start using the patch on the day it is prescribed, regardless of where you are in your menstrual cycle (called \"quick start\"). If you do this, you will need to use a backup form of birth control (eg, condoms) for the first seven days.  As with the pill, in addition to being an effective method of preventing pregnancy, the patch also likely has other potential benefits, although studies are limited. These include a reduction in menstrual cramps, iron-deficiency anemia, and the risk of certain cancers.     While efficacy is similar to the pill, the patch may deliver a higher overall dose of estrogen. Some studies found that this was associated with an approximate doubling of the risk of blood clots compared with use of oral contraceptives (the pill). However, other studies found no increase in risk compared with people using the pill. Further study is needed to define this risk.    PREGNANCY AFTER HORMONAL BIRTH CONTROL     The length of time it takes to become pregnant after use of a hormonal method of birth control depends upon which method was used, as well as some individual factors.  Most people return to their normal level of fertility within a cycle or two. For some, it may take several months before their cycle (including when they ovulate) becomes regular and they can get pregnant. This is more likely for people whose periods were irregular " before starting birth control. However, hormonal birth control does not increase the risk of infertility. In general:    ?People who use the pill, skin patch, or vaginal ring usually start ovulating regularly again within one to three months of stopping birth control.    ?With injectable depot medroxyprogesterone acetate (DMPA; brand name: Depo-Provera), return of fertility can be delayed. Approximately half of people who want to be pregnant are pregnant within 10 months of stopping DMPA. However, some people will not get their periods back for up to 18 months.    ?People who get an implant (eg, Nexplanon) or an intrauterine device (IUD) usually begin to ovulate again within one month after the device is removed.    EMERGENCY CONTRACEPTION     If you have unprotected sex or a problem with your birth control (for example, you miss a pill, your skin patch falls off, or your vaginal ring falls out), you can use emergency contraception to reduce your risk of pregnancy. There are two forms of emergency contraception, the copper intrauterine device (IUD) and pills. Emergency contraception should be taken as soon as possible after sex, ideally within 120 hours (five days). More information about this is available separately.     https://[email or web address].com/contents/[email or web address]-methods-[email or web address]-birth-[email or web address]-the-basics

## 2025-02-26 NOTE — TELEPHONE ENCOUNTER
Patient called to confirm that the depo script was sent to the pharmacy. Patient was made aware that it was sent and we received confirmation at 3:06pm today. Patient will contact the pharmacy

## 2025-02-26 NOTE — PROGRESS NOTES
Pt is here for first dose of Depo Provera injection.  Her annual exam was on 2/26/2025(birth control consult).  Urine pregnancy test done: Yes   Result: Neg  Depo given in R Buttock  Tolerated well.  Lot [id number] 4/30/2027  Next dose due 5/14-5/28/2025.   Refill needed No

## 2025-02-26 NOTE — PROGRESS NOTES
Name: Anuja Candelario      : 1990      MRN: [id number]  Encounter Provider: Juana Oquendo PA-C  Encounter Date: 2025   Encounter department: [facility]  :  Assessment & Plan  Birth control counseling    Pt desires BC. I thoroughly r/w pt all available options for BC including OCPs, patch, ring, Depo Provera, IUDs, Nexplanon and condoms. Aware of need for condoms for continued STD protection regardless of type of BC she chooses. Aware of risks and benefits and usage of each form of BC. Aware of start up, back up, etc.     Elects the Depo-Provera shot.  Shot sent to the pharmacy.  Advised patient to  the shot and have it administered in the office.    Patient should follow-up in 3 months for next shot as well as a review of all      Initiation of Depo Provera  Patient should follow-up in 3 months for her next shot as well as an evaluation to see how she is doing on the shot  Orders:    medroxyPROGESTERone (DEPO-PROVERA) 150 mg/mL injection; Inject 1 mL (150 mg total) into a muscle every 3 (three) months    Menorrhagia with regular cycle  Initiated Depo-Provera        History of Present Illness   HPI  Anuja Candelario is a 35 y.o. female who presents for BC consult    Current birth control: none  Reason for starting: heavy menses  Previous birth controls: Many years ago was on lo loestrin, recently checked with insurance which does not cover this anymore     LMP: 25  Menses: heavy lasting 7 days, regular cycle    Tried txa but didn't like how she felt on it.    Patient has been trying to prioritize a healthy lifestyle .  As a result she has Lost 60 lbs in total      Current Outpatient Medications on File Prior to Visit   Medication Sig Dispense Refill    phentermine 37.5 MG capsule Take 1 capsule (37.5 mg total) by mouth every morning 30 capsule 0     No current facility-administered medications on file prior to visit.      Social History     Tobacco  "Use    Smoking status: Former     Current packs/day: 0.00     Average packs/day: 0.5 packs/day for 14.9 years (7.5 ttl pk-yrs)     Types: Cigarettes     Start date: 2005     Quit date: 2020     Years since quittin.1    Smokeless tobacco: Never    Tobacco comments:     quit  6 months ago   Vaping Use    Vaping status: Never Used   Substance and Sexual Activity    Alcohol use: Not Currently     Alcohol/week: 1.0 standard drink of alcohol     Comment: 8 months sober    Drug use: No    Sexual activity: Yes     Partners: Male     Birth control/protection: None     Comment: Eleni         Objective   /74 (BP Location: Left arm, Patient Position: Sitting, Cuff Size: Adult)   Ht 5' 4\" (1.626 m)   Wt 75.9 kg (167 lb 6.4 oz)   LMP 2025 (Exact Date)   BMI 28.73 kg/m²      Physical Exam  Constitutional:       Appearance: Normal appearance.   HENT:      Head: Normocephalic and atraumatic.      Nose: Nose normal.   Eyes:      Extraocular Movements: Extraocular movements intact.   Pulmonary:      Effort: Pulmonary effort is normal.   Musculoskeletal:         General: Normal range of motion.   Neurological:      Mental Status: She is alert.   Psychiatric:         Mood and Affect: Mood normal.         Behavior: Behavior normal.         Juana Oquendo PA-C  "

## 2025-02-27 ENCOUNTER — RESULTS FOLLOW-UP (OUTPATIENT)
Age: 35
End: 2025-02-27

## 2025-02-27 DIAGNOSIS — R17 SERUM TOTAL BILIRUBIN ELEVATED: Primary | ICD-10-CM

## 2025-03-11 LAB
APOB+LDLR+PCSK9 GENE MUT ANL BLD/T: NOT DETECTED
BRCA1+BRCA2 DEL+DUP + FULL MUT ANL BLD/T: NOT DETECTED
MLH1+MSH2+MSH6+PMS2 GN DEL+DUP+FUL M: NOT DETECTED

## 2025-04-14 DIAGNOSIS — E66.9 OBESITY (BMI 30-39.9): ICD-10-CM

## 2025-04-15 RX ORDER — PHENTERMINE HYDROCHLORIDE 37.5 MG/1
37.5 CAPSULE ORAL EVERY MORNING
Qty: 30 CAPSULE | Refills: 0 | Status: SHIPPED | OUTPATIENT
Start: 2025-04-15 | End: 2025-05-15

## 2025-05-07 ENCOUNTER — TELEPHONE (OUTPATIENT)
Age: 35
End: 2025-05-07

## 2025-05-07 NOTE — TELEPHONE ENCOUNTER
Patient called the RX Refill Line. Message is being forwarded to the office.     Patient is advising that the pharmacy is unable to refill the Depo provera 150 mg/ml injection until 5/24/25 and patient has an appointment scheduled for 5/14/25.  Asked if there is any way to get an override from insurance to fill the prescription earlier.       Please contact patient at [phone]

## 2025-05-07 NOTE — TELEPHONE ENCOUNTER
Spoke with patient and she confirmed that her last depo was on 02/26/2025 and she would not like to reschedule her next depo injection scheduled on 05/14/2025 as she has other appointments and wanted to come on that day. Patient asked if our office can check with insurance to override refill. Please advise. Thank you!

## 2025-05-07 NOTE — TELEPHONE ENCOUNTER
Please clarify with the patient her injection date, from my knowledge it was provided on 2/26/2025.  If that is the case, her injection window is from 5/14/2025 to 5/28/2025.  Therefore, it would be okay for her to reschedule until after her prescription is able to be filled on 5/24/2025.

## 2025-05-08 NOTE — TELEPHONE ENCOUNTER
I was not sure of the answer so I reached out to [place] for clarification .      Provider would have to reach out to RX line or resubmit RX to  same day.

## 2025-05-09 DIAGNOSIS — Z30.09 BIRTH CONTROL COUNSELING: ICD-10-CM

## 2025-05-09 DIAGNOSIS — Z30.013 INITIATION OF DEPO PROVERA: ICD-10-CM

## 2025-05-09 RX ORDER — MEDROXYPROGESTERONE ACETATE 150 MG/ML
150 INJECTION, SUSPENSION INTRAMUSCULAR
Qty: 1 ML | Refills: 0 | Status: SHIPPED | OUTPATIENT
Start: 2025-05-09

## 2025-05-09 NOTE — TELEPHONE ENCOUNTER
Please let the patient know that I reordered the prescription and that in the prescription I requested for the prescription to be available for pickup on 5/14/2025

## 2025-05-14 ENCOUNTER — OFFICE VISIT (OUTPATIENT)
Age: 35
End: 2025-05-14
Payer: COMMERCIAL

## 2025-05-14 ENCOUNTER — CLINICAL SUPPORT (OUTPATIENT)
Age: 35
End: 2025-05-14
Payer: COMMERCIAL

## 2025-05-14 VITALS — BODY MASS INDEX: 28.08 KG/M2 | SYSTOLIC BLOOD PRESSURE: 124 MMHG | WEIGHT: 163.6 LBS | DIASTOLIC BLOOD PRESSURE: 78 MMHG

## 2025-05-14 VITALS
HEART RATE: 85 BPM | TEMPERATURE: 97.5 F | DIASTOLIC BLOOD PRESSURE: 70 MMHG | HEIGHT: 64 IN | OXYGEN SATURATION: 99 % | BODY MASS INDEX: 27.83 KG/M2 | SYSTOLIC BLOOD PRESSURE: 110 MMHG | WEIGHT: 163 LBS

## 2025-05-14 DIAGNOSIS — Z30.42 ENCOUNTER FOR MANAGEMENT AND INJECTION OF DEPO-PROVERA: Primary | ICD-10-CM

## 2025-05-14 DIAGNOSIS — E66.3 OVERWEIGHT (BMI 25.0-29.9): Primary | ICD-10-CM

## 2025-05-14 DIAGNOSIS — B00.1 RECURRENT COLD SORES: ICD-10-CM

## 2025-05-14 DIAGNOSIS — E66.9 OBESITY (BMI 30-39.9): ICD-10-CM

## 2025-05-14 PROCEDURE — 99214 OFFICE O/P EST MOD 30 MIN: CPT

## 2025-05-14 PROCEDURE — 96372 THER/PROPH/DIAG INJ SC/IM: CPT

## 2025-05-14 RX ORDER — VALACYCLOVIR HYDROCHLORIDE 1 G/1
2000 TABLET, FILM COATED ORAL AS NEEDED
Qty: 56 TABLET | Refills: 0 | Status: SHIPPED | OUTPATIENT
Start: 2025-05-14 | End: 2025-05-14

## 2025-05-14 RX ORDER — MEDROXYPROGESTERONE ACETATE 150 MG/ML
150 INJECTION, SUSPENSION INTRAMUSCULAR ONCE
Status: COMPLETED | OUTPATIENT
Start: 2025-05-14 | End: 2025-05-14

## 2025-05-14 RX ORDER — VALACYCLOVIR HYDROCHLORIDE 1 G/1
TABLET, FILM COATED ORAL
Qty: 56 TABLET | Refills: 0 | Status: SHIPPED | OUTPATIENT
Start: 2025-05-14 | End: 2025-09-14

## 2025-05-14 RX ORDER — PHENTERMINE HYDROCHLORIDE 37.5 MG/1
37.5 CAPSULE ORAL EVERY MORNING
Qty: 30 CAPSULE | Refills: 0 | Status: SHIPPED | OUTPATIENT
Start: 2025-05-14 | End: 2025-06-13

## 2025-05-14 RX ADMIN — MEDROXYPROGESTERONE ACETATE 150 MG: 150 INJECTION, SUSPENSION INTRAMUSCULAR at 13:44

## 2025-05-14 NOTE — ASSESSMENT & PLAN NOTE
Improved, currently resolved. Plan as above, doing well with pharmacologic therapy plan and lifestyle modifications   Orders:    phentermine 37.5 MG capsule; Take 1 capsule (37.5 mg total) by mouth every morning

## 2025-05-14 NOTE — PROGRESS NOTES
Pt is here for Depo Provera injection. Her last dose was 2/26/2025.  Her annual exam was on 2/26/2025.  Urine pregnancy test done: No   Result: N/A  Depo given in L Buttock  Tolerated well.  Lot [id number]  Next dose due 7/30-8/13/2025.   Refill needed NO

## 2025-05-14 NOTE — PROGRESS NOTES
Name: Anuja Candelario      : 1990      MRN: [id number]  Encounter Provider: Gasper Trotter PA-C  Encounter Date: 2025   Encounter department: [facility] [place]  :  Assessment & Plan  Overweight (BMI 25.0-29.9)  Weight has been doing well. Current BMI of 27.98 would like to continue phentirmine for now, goal of losing another 20 lbs   Update labs in 6 months, follow up 6 months for recheck   Orders:    CBC and differential; Future    Comprehensive metabolic panel; Future    Lipid panel; Future    Obesity (BMI 30-39.9)  Improved, currently resolved. Plan as above, doing well with pharmacologic therapy plan and lifestyle modifications   Orders:    phentermine 37.5 MG capsule; Take 1 capsule (37.5 mg total) by mouth every morning    Recurrent cold sores  Gets cold sores monthly and would like to take valacyclovir for them. Has been better since starting  depo provera  Can take as needed   Discussed not for long term continuous use but may take as needed every 4-6 weeks or less if it provides benefit.   Orders:    valACYclovir (VALTREX) 1,000 mg tablet; Take 2 tabs (2,000 mg) every 12 hr for one day as needed for cold sores          Depression Screening and Follow-up Plan: Patient was screened for depression during today's encounter. They screened negative with a PHQ-2 score of 0.        History of Present Illness   Patient presents today for cold sores and medication weight check   Has been making diet changes, doing keto and cardiovascular exercise frequently       Review of Systems   Constitutional:  Negative for activity change, diaphoresis, fatigue and fever.   HENT: Negative.     Eyes: Negative.    Respiratory: Negative.  Negative for cough, chest tightness and shortness of breath.    Cardiovascular:  Negative for chest pain, palpitations and leg swelling.   Gastrointestinal: Negative.    Endocrine: Negative.  Negative for polydipsia, polyphagia and polyuria.   Genitourinary:  "Negative.  Negative for dysuria, flank pain, frequency, hematuria and urgency.   Musculoskeletal: Negative.  Negative for back pain, joint swelling, neck pain and neck stiffness.   Skin: Negative.    Neurological: Negative.  Negative for dizziness, weakness, light-headedness and headaches.   Hematological:  Negative for adenopathy.   Psychiatric/Behavioral: Negative.  Negative for confusion and sleep disturbance. The patient is not nervous/anxious.        Objective   /70 (Patient Position: Sitting, Cuff Size: Standard)   Pulse 85   Temp 97.5 °F (36.4 °C) (Tympanic)   Ht 5' 4\" (1.626 m)   Wt 73.9 kg (163 lb)   SpO2 99%   BMI 27.98 kg/m²      Physical Exam  Vitals and nursing note reviewed.   Constitutional:       General: She is not in acute distress.     Appearance: Normal appearance. She is not ill-appearing, toxic-appearing or diaphoretic.   HENT:      Head: Normocephalic and atraumatic.      Right Ear: External ear normal.      Left Ear: External ear normal.     Eyes:      General: No scleral icterus.        Right eye: No discharge.         Left eye: No discharge.      Extraocular Movements: Extraocular movements intact.      Conjunctiva/sclera: Conjunctivae normal.     Neck:      Vascular: No carotid bruit.     Cardiovascular:      Rate and Rhythm: Normal rate and regular rhythm.      Heart sounds: No murmur heard.  Pulmonary:      Effort: Pulmonary effort is normal.      Breath sounds: Normal breath sounds.   Abdominal:      General: Abdomen is flat. Bowel sounds are normal.      Palpations: There is no mass.      Tenderness: There is no abdominal tenderness. There is no rebound.     Musculoskeletal:      Cervical back: Normal range of motion and neck supple.     Skin:     General: Skin is warm and dry.      Findings: No rash.     Neurological:      Mental Status: She is alert. Mental status is at baseline.      Sensory: No sensory deficit.      Motor: No weakness.     Psychiatric:         Mood and " "Affect: Mood normal.         Behavior: Behavior normal.         Thought Content: Thought content normal.         Judgment: Judgment normal.       Portions of the record may have been created with voice recognition software. Occasional wrong word or \"sound a like\" substitutions may have occurred due to the inherent limitations of voice recognition software. Read the chart carefully and recognize, using context, where substitutions have occurred.    "

## 2025-07-13 DIAGNOSIS — E66.9 OBESITY (BMI 30-39.9): ICD-10-CM

## 2025-07-14 RX ORDER — PHENTERMINE HYDROCHLORIDE 37.5 MG/1
37.5 CAPSULE ORAL EVERY MORNING
Qty: 30 CAPSULE | Refills: 0 | Status: SHIPPED | OUTPATIENT
Start: 2025-07-14 | End: 2025-08-13

## 2025-08-05 DIAGNOSIS — Z30.013 INITIATION OF DEPO PROVERA: ICD-10-CM

## 2025-08-05 DIAGNOSIS — Z30.09 BIRTH CONTROL COUNSELING: ICD-10-CM

## 2025-08-06 ENCOUNTER — ANNUAL EXAM (OUTPATIENT)
Age: 35
End: 2025-08-06
Payer: COMMERCIAL

## 2025-08-06 VITALS — WEIGHT: 168.4 LBS | SYSTOLIC BLOOD PRESSURE: 108 MMHG | DIASTOLIC BLOOD PRESSURE: 68 MMHG | BODY MASS INDEX: 28.91 KG/M2

## 2025-08-06 DIAGNOSIS — Z12.4 SCREENING FOR CERVICAL CANCER: Primary | ICD-10-CM

## 2025-08-06 DIAGNOSIS — Z01.419 ENCOUNTER FOR ANNUAL ROUTINE GYNECOLOGICAL EXAMINATION: ICD-10-CM

## 2025-08-06 DIAGNOSIS — Z30.42 ENCOUNTER FOR MANAGEMENT AND INJECTION OF DEPO-PROVERA: ICD-10-CM

## 2025-08-06 PROCEDURE — G0476 HPV COMBO ASSAY CA SCREEN: HCPCS

## 2025-08-06 PROCEDURE — S0612 ANNUAL GYNECOLOGICAL EXAMINA: HCPCS

## 2025-08-06 PROCEDURE — 96372 THER/PROPH/DIAG INJ SC/IM: CPT

## 2025-08-06 RX ORDER — MEDROXYPROGESTERONE ACETATE 150 MG/ML
150 INJECTION, SUSPENSION INTRAMUSCULAR ONCE
Status: COMPLETED | OUTPATIENT
Start: 2025-08-06 | End: 2025-08-06

## 2025-08-06 RX ORDER — MEDROXYPROGESTERONE ACETATE 150 MG/ML
150 INJECTION, SUSPENSION INTRAMUSCULAR
Qty: 1 ML | Refills: 0 | Status: SHIPPED | OUTPATIENT
Start: 2025-08-06

## 2025-08-06 RX ADMIN — MEDROXYPROGESTERONE ACETATE 150 MG: 150 INJECTION, SUSPENSION INTRAMUSCULAR at 14:15

## 2025-08-07 LAB
HPV HR 12 DNA CVX QL NAA+PROBE: NEGATIVE
HPV16 DNA CVX QL NAA+PROBE: NEGATIVE
HPV18 DNA CVX QL NAA+PROBE: NEGATIVE

## 2025-08-11 ENCOUNTER — RESULTS FOLLOW-UP (OUTPATIENT)
Age: 35
End: 2025-08-11

## 2025-08-11 LAB
LAB AP GYN PRIMARY INTERPRETATION: NORMAL
Lab: NORMAL
PATH INTERP SPEC-IMP: NORMAL

## (undated) DEVICE — ADHESIVE SKIN HIGH VISCOSITY EXOFIN 1ML

## (undated) DEVICE — GLOVE INDICATOR PI UNDERGLOVE SZ 7 BLUE

## (undated) DEVICE — SUT MONOCRYL 0 CTX 36 IN Y398H

## (undated) DEVICE — SUT VICRYL 0 CT-1 36 IN J946H

## (undated) DEVICE — PACK C-SECTION PBDS

## (undated) DEVICE — Device

## (undated) DEVICE — GLOVE SRG BIOGEL ECLIPSE 6.5

## (undated) DEVICE — SKIN MARKER DUAL TIP WITH RULER CAP, FLEXIBLE RULER AND LABELS: Brand: DEVON

## (undated) DEVICE — SUT VICRYL 4-0 KS 27 IN J662H

## (undated) DEVICE — SURGICEL 2 X 3

## (undated) DEVICE — CHLORAPREP HI-LITE 26ML ORANGE

## (undated) DEVICE — GAUZE SPONGES,16 PLY: Brand: CURITY